# Patient Record
Sex: FEMALE | Race: WHITE | NOT HISPANIC OR LATINO | Employment: FULL TIME | URBAN - METROPOLITAN AREA
[De-identification: names, ages, dates, MRNs, and addresses within clinical notes are randomized per-mention and may not be internally consistent; named-entity substitution may affect disease eponyms.]

---

## 2017-07-21 ENCOUNTER — OFFICE VISIT - HEALTHEAST (OUTPATIENT)
Dept: FAMILY MEDICINE | Facility: CLINIC | Age: 37
End: 2017-07-21

## 2017-07-21 DIAGNOSIS — Z00.01 ENCOUNTER FOR GENERAL ADULT MEDICAL EXAMINATION WITH ABNORMAL FINDINGS: ICD-10-CM

## 2017-07-21 DIAGNOSIS — R53.83 FATIGUE: ICD-10-CM

## 2017-07-21 DIAGNOSIS — M25.50 ARTHRALGIA: ICD-10-CM

## 2017-07-21 DIAGNOSIS — R07.89 CHEST WALL PAIN: ICD-10-CM

## 2017-07-21 ASSESSMENT — MIFFLIN-ST. JEOR: SCORE: 1411.94

## 2018-03-02 ENCOUNTER — COMMUNICATION - HEALTHEAST (OUTPATIENT)
Dept: FAMILY MEDICINE | Facility: CLINIC | Age: 38
End: 2018-03-02

## 2018-03-02 DIAGNOSIS — F32.81 PREMENSTRUAL DYSPHORIC DISORDER: ICD-10-CM

## 2018-04-30 ENCOUNTER — OFFICE VISIT - HEALTHEAST (OUTPATIENT)
Dept: MIDWIFE SERVICES | Facility: CLINIC | Age: 38
End: 2018-04-30

## 2018-04-30 DIAGNOSIS — N89.8 VAGINAL ITCHING: ICD-10-CM

## 2018-04-30 DIAGNOSIS — T83.32XA INTRAUTERINE CONTRACEPTIVE DEVICE THREADS LOST, INITIAL ENCOUNTER: ICD-10-CM

## 2018-04-30 LAB
CLUE CELLS: NORMAL
TRICHOMONAS, WET PREP: NORMAL
YEAST, WET PREP: NORMAL

## 2018-04-30 ASSESSMENT — MIFFLIN-ST. JEOR: SCORE: 1411.94

## 2018-05-01 ENCOUNTER — COMMUNICATION - HEALTHEAST (OUTPATIENT)
Dept: OBGYN | Facility: CLINIC | Age: 38
End: 2018-05-01

## 2018-05-01 LAB
C TRACH DNA SPEC QL PROBE+SIG AMP: NEGATIVE
N GONORRHOEA DNA SPEC QL NAA+PROBE: NEGATIVE

## 2018-05-18 ENCOUNTER — RECORDS - HEALTHEAST (OUTPATIENT)
Dept: ADMINISTRATIVE | Facility: OTHER | Age: 38
End: 2018-05-18

## 2018-05-18 ENCOUNTER — OFFICE VISIT - HEALTHEAST (OUTPATIENT)
Dept: FAMILY MEDICINE | Facility: CLINIC | Age: 38
End: 2018-05-18

## 2018-05-18 ENCOUNTER — COMMUNICATION - HEALTHEAST (OUTPATIENT)
Dept: FAMILY MEDICINE | Facility: CLINIC | Age: 38
End: 2018-05-18

## 2018-05-18 ENCOUNTER — TRANSFERRED RECORDS (OUTPATIENT)
Dept: HEALTH INFORMATION MANAGEMENT | Facility: CLINIC | Age: 38
End: 2018-05-18

## 2018-05-18 DIAGNOSIS — F32.81 PREMENSTRUAL DYSPHORIC DISORDER: ICD-10-CM

## 2018-05-18 DIAGNOSIS — R51.9 HEADACHE: ICD-10-CM

## 2018-08-24 ENCOUNTER — RECORDS - HEALTHEAST (OUTPATIENT)
Dept: ADMINISTRATIVE | Facility: OTHER | Age: 38
End: 2018-08-24

## 2018-11-01 ENCOUNTER — COMMUNICATION - HEALTHEAST (OUTPATIENT)
Dept: FAMILY MEDICINE | Facility: CLINIC | Age: 38
End: 2018-11-01

## 2018-11-01 DIAGNOSIS — M25.50 JOINT PAIN: ICD-10-CM

## 2018-11-05 ENCOUNTER — MEDICAL CORRESPONDENCE (OUTPATIENT)
Dept: HEALTH INFORMATION MANAGEMENT | Facility: CLINIC | Age: 38
End: 2018-11-05

## 2018-11-16 ENCOUNTER — AMBULATORY - HEALTHEAST (OUTPATIENT)
Dept: FAMILY MEDICINE | Facility: CLINIC | Age: 38
End: 2018-11-16

## 2018-11-16 ENCOUNTER — COMMUNICATION - HEALTHEAST (OUTPATIENT)
Dept: FAMILY MEDICINE | Facility: CLINIC | Age: 38
End: 2018-11-16

## 2018-11-16 ENCOUNTER — TELEPHONE (OUTPATIENT)
Dept: RHEUMATOLOGY | Facility: CLINIC | Age: 38
End: 2018-11-16

## 2018-11-16 DIAGNOSIS — T83.32XD INTRAUTERINE CONTRACEPTIVE DEVICE THREADS LOST, SUBSEQUENT ENCOUNTER: ICD-10-CM

## 2018-11-16 NOTE — TELEPHONE ENCOUNTER
[facility]    Phone Message    May a detailed message be left on voicemail: yes    Reason for Call: Other: [facility]. Recs/referral fwd to clinic     Action Taken: Message routed to:  [facility] ([facility] Rheum

## 2018-11-28 NOTE — TELEPHONE ENCOUNTER
Referral received from Carmela Her at [facility] for joint pain. Patient prefers a female provider.   Valeria Matthews CMA  11/28/2018 10:34 AM

## 2018-11-29 ENCOUNTER — COMMUNICATION - HEALTHEAST (OUTPATIENT)
Dept: FAMILY MEDICINE | Facility: CLINIC | Age: 38
End: 2018-11-29

## 2018-11-30 ENCOUNTER — RECORDS - HEALTHEAST (OUTPATIENT)
Dept: ADMINISTRATIVE | Facility: OTHER | Age: 38
End: 2018-11-30

## 2018-12-04 ENCOUNTER — COMMUNICATION - HEALTHEAST (OUTPATIENT)
Dept: OBGYN | Facility: CLINIC | Age: 38
End: 2018-12-04

## 2018-12-04 ENCOUNTER — AMBULATORY - HEALTHEAST (OUTPATIENT)
Dept: OBGYN | Facility: CLINIC | Age: 38
End: 2018-12-04

## 2018-12-04 DIAGNOSIS — Z30.433 ENCOUNTER FOR IUD REMOVAL AND REINSERTION: ICD-10-CM

## 2018-12-04 ASSESSMENT — MIFFLIN-ST. JEOR: SCORE: 1474.99

## 2018-12-05 NOTE — TELEPHONE ENCOUNTER
General Rheumatology Intake Form    Reason for referral: joint pain  Referring provider: Dr. Her at [facility]    Past Rheumatologist: No     Have you been diagnosed with Fibromyalgia? no    Who manages your care for this issue now? None, this a new issue     What is your most urgent concern at this time? Joint pain    Have you seen any specialist related to the reason you are coming here? no    Where are we expecting records (labs, imaging or pathology) from? [facility]    Offered an appointment on 3/28/2018 with Dr. Logan, patient accepted and was instructed to arrive 15 minutes prior to appointment and asked to bring a current medication list. Patient verbalized understanding. Appointment reminder mailed to patient.       Valreia Matthews CMA  12/5/2018 3:14 PM

## 2018-12-14 ENCOUNTER — OFFICE VISIT - HEALTHEAST (OUTPATIENT)
Dept: FAMILY MEDICINE | Facility: CLINIC | Age: 38
End: 2018-12-14

## 2018-12-14 DIAGNOSIS — R53.83 FATIGUE, UNSPECIFIED TYPE: ICD-10-CM

## 2018-12-14 DIAGNOSIS — Z00.01 ENCOUNTER FOR GENERAL ADULT MEDICAL EXAMINATION WITH ABNORMAL FINDINGS: ICD-10-CM

## 2018-12-14 DIAGNOSIS — R23.3 EASY BRUISING: ICD-10-CM

## 2018-12-14 DIAGNOSIS — R63.5 WEIGHT GAIN: ICD-10-CM

## 2018-12-14 DIAGNOSIS — R19.06 EPIGASTRIC MASS: ICD-10-CM

## 2018-12-14 LAB
ALBUMIN SERPL-MCNC: 4.1 G/DL (ref 3.5–5)
ALP SERPL-CCNC: 59 U/L (ref 45–120)
ALT SERPL W P-5'-P-CCNC: 17 U/L (ref 0–45)
ANION GAP SERPL CALCULATED.3IONS-SCNC: 8 MMOL/L (ref 5–18)
APTT PPP: 31 SECONDS (ref 24–37)
AST SERPL W P-5'-P-CCNC: 19 U/L (ref 0–40)
BASOPHILS # BLD AUTO: 0 THOU/UL (ref 0–0.2)
BASOPHILS NFR BLD AUTO: 1 % (ref 0–2)
BILIRUB SERPL-MCNC: 0.7 MG/DL (ref 0–1)
BUN SERPL-MCNC: 11 MG/DL (ref 8–22)
C REACTIVE PROTEIN LHE: 0.1 MG/DL (ref 0–0.8)
CALCIUM SERPL-MCNC: 9.9 MG/DL (ref 8.5–10.5)
CHLORIDE BLD-SCNC: 103 MMOL/L (ref 98–107)
CLOSURE TME COLL+ADP BLD: 98 SEC (ref 1–110)
CLOSURE TME COLL+EPINEP BLD: >300 SEC (ref 1–180)
CO2 SERPL-SCNC: 28 MMOL/L (ref 22–31)
CREAT SERPL-MCNC: 0.78 MG/DL (ref 0.6–1.1)
EOSINOPHIL # BLD AUTO: 0.1 THOU/UL (ref 0–0.4)
EOSINOPHIL NFR BLD AUTO: 2 % (ref 0–6)
ERYTHROCYTE [DISTWIDTH] IN BLOOD BY AUTOMATED COUNT: 12.2 % (ref 11–14.5)
GFR SERPL CREATININE-BSD FRML MDRD: >60 ML/MIN/1.73M2
GLUCOSE BLD-MCNC: 88 MG/DL (ref 70–125)
HCT VFR BLD AUTO: 44.5 % (ref 35–47)
HGB BLD-MCNC: 14.8 G/DL (ref 12–16)
INR PPP: 1.02 (ref 0.9–1.1)
LYMPHOCYTES # BLD AUTO: 1.8 THOU/UL (ref 0.8–4.4)
LYMPHOCYTES NFR BLD AUTO: 27 % (ref 20–40)
MCH RBC QN AUTO: 30.5 PG (ref 27–34)
MCHC RBC AUTO-ENTMCNC: 33.4 G/DL (ref 32–36)
MCV RBC AUTO: 92 FL (ref 80–100)
MONOCYTES # BLD AUTO: 0.4 THOU/UL (ref 0–0.9)
MONOCYTES NFR BLD AUTO: 6 % (ref 2–10)
NEUTROPHILS # BLD AUTO: 4.2 THOU/UL (ref 2–7.7)
NEUTROPHILS NFR BLD AUTO: 64 % (ref 50–70)
PLATELET # BLD AUTO: 276 THOU/UL (ref 140–440)
PMV BLD AUTO: 8.2 FL (ref 7–10)
POTASSIUM BLD-SCNC: 4 MMOL/L (ref 3.5–5)
PROT SERPL-MCNC: 7 G/DL (ref 6–8)
RBC # BLD AUTO: 4.86 MILL/UL (ref 3.8–5.4)
SODIUM SERPL-SCNC: 139 MMOL/L (ref 136–145)
T4 FREE SERPL-MCNC: 1 NG/DL (ref 0.7–1.8)
TSH SERPL DL<=0.005 MIU/L-ACNC: 1.84 UIU/ML (ref 0.3–5)
WBC: 6.5 THOU/UL (ref 4–11)

## 2018-12-14 ASSESSMENT — MIFFLIN-ST. JEOR: SCORE: 1462.4

## 2018-12-15 ENCOUNTER — HOSPITAL ENCOUNTER (OUTPATIENT)
Dept: ULTRASOUND IMAGING | Facility: CLINIC | Age: 38
Discharge: HOME | End: 2018-12-15
Attending: FAMILY MEDICINE

## 2018-12-15 DIAGNOSIS — R19.06 EPIGASTRIC MASS: ICD-10-CM

## 2018-12-17 ENCOUNTER — COMMUNICATION - HEALTHEAST (OUTPATIENT)
Dept: FAMILY MEDICINE | Facility: CLINIC | Age: 38
End: 2018-12-17

## 2018-12-29 ENCOUNTER — RECORDS - HEALTHEAST (OUTPATIENT)
Dept: ADMINISTRATIVE | Facility: OTHER | Age: 38
End: 2018-12-29

## 2018-12-29 ENCOUNTER — TRANSFERRED RECORDS (OUTPATIENT)
Dept: HEALTH INFORMATION MANAGEMENT | Facility: CLINIC | Age: 38
End: 2018-12-29
Payer: COMMERCIAL

## 2019-01-08 ENCOUNTER — COMMUNICATION - HEALTHEAST (OUTPATIENT)
Dept: FAMILY MEDICINE | Facility: CLINIC | Age: 39
End: 2019-01-08

## 2019-04-16 ENCOUNTER — COMMUNICATION - HEALTHEAST (OUTPATIENT)
Dept: FAMILY MEDICINE | Facility: CLINIC | Age: 39
End: 2019-04-16

## 2019-04-16 DIAGNOSIS — R19.06 EPIGASTRIC MASS: ICD-10-CM

## 2019-04-26 ENCOUNTER — COMMUNICATION - HEALTHEAST (OUTPATIENT)
Dept: FAMILY MEDICINE | Facility: CLINIC | Age: 39
End: 2019-04-26

## 2019-05-14 ENCOUNTER — OFFICE VISIT - HEALTHEAST (OUTPATIENT)
Dept: FAMILY MEDICINE | Facility: CLINIC | Age: 39
End: 2019-05-14

## 2019-05-14 DIAGNOSIS — R19.06 EPIGASTRIC MASS: ICD-10-CM

## 2019-05-14 DIAGNOSIS — F32.81 PMDD (PREMENSTRUAL DYSPHORIC DISORDER): ICD-10-CM

## 2019-05-14 DIAGNOSIS — E55.9 VITAMIN D DEFICIENCY: ICD-10-CM

## 2019-05-14 ASSESSMENT — MIFFLIN-ST. JEOR: SCORE: 1497.78

## 2019-05-17 ENCOUNTER — COMMUNICATION - HEALTHEAST (OUTPATIENT)
Dept: FAMILY MEDICINE | Facility: CLINIC | Age: 39
End: 2019-05-17

## 2019-05-23 ENCOUNTER — HOSPITAL ENCOUNTER (OUTPATIENT)
Dept: CT IMAGING | Facility: HOSPITAL | Age: 39
Discharge: HOME | End: 2019-05-23
Attending: FAMILY MEDICINE

## 2019-05-23 DIAGNOSIS — R19.06 EPIGASTRIC MASS: ICD-10-CM

## 2019-06-28 ENCOUNTER — RECORDS - HEALTHEAST (OUTPATIENT)
Dept: ADMINISTRATIVE | Facility: OTHER | Age: 39
End: 2019-06-28

## 2019-06-28 ENCOUNTER — ANCILLARY PROCEDURE (OUTPATIENT)
Dept: GENERAL RADIOLOGY | Facility: CLINIC | Age: 39
End: 2019-06-28
Attending: INTERNAL MEDICINE
Payer: COMMERCIAL

## 2019-06-28 ENCOUNTER — OFFICE VISIT (OUTPATIENT)
Dept: RHEUMATOLOGY | Facility: CLINIC | Age: 39
End: 2019-06-28
Attending: INTERNAL MEDICINE
Payer: COMMERCIAL

## 2019-06-28 VITALS
OXYGEN SATURATION: 98 % | HEIGHT: 64 IN | HEART RATE: 81 BPM | DIASTOLIC BLOOD PRESSURE: 74 MMHG | BODY MASS INDEX: 31.86 KG/M2 | WEIGHT: 186.6 LBS | SYSTOLIC BLOOD PRESSURE: 114 MMHG | TEMPERATURE: 97.6 F

## 2019-06-28 DIAGNOSIS — M25.50 ARTHRALGIA, UNSPECIFIED JOINT: ICD-10-CM

## 2019-06-28 DIAGNOSIS — M25.50 ARTHRALGIA, UNSPECIFIED JOINT: Primary | ICD-10-CM

## 2019-06-28 LAB
ALBUMIN UR-MCNC: NEGATIVE MG/DL
ALT SERPL W P-5'-P-CCNC: 26 U/L (ref 0–50)
APPEARANCE UR: CLEAR
AST SERPL W P-5'-P-CCNC: 16 U/L (ref 0–45)
BILIRUB UR QL STRIP: NEGATIVE
CK SERPL-CCNC: 89 U/L (ref 30–225)
COLOR UR AUTO: YELLOW
CREAT SERPL-MCNC: 0.8 MG/DL (ref 0.52–1.04)
CRP SERPL-MCNC: <2.9 MG/L (ref 0–8)
ERYTHROCYTE [DISTWIDTH] IN BLOOD BY AUTOMATED COUNT: 12.4 % (ref 10–15)
GFR SERPL CREATININE-BSD FRML MDRD: >90 ML/MIN/{1.73_M2}
GLUCOSE UR STRIP-MCNC: NEGATIVE MG/DL
HCT VFR BLD AUTO: 45.3 % (ref 35–47)
HGB BLD-MCNC: 15.2 G/DL (ref 11.7–15.7)
HGB UR QL STRIP: NEGATIVE
KETONES UR STRIP-MCNC: NEGATIVE MG/DL
LEUKOCYTE ESTERASE UR QL STRIP: NEGATIVE
MCH RBC QN AUTO: 30.6 PG (ref 26.5–33)
MCHC RBC AUTO-ENTMCNC: 33.6 G/DL (ref 31.5–36.5)
MCV RBC AUTO: 91 FL (ref 78–100)
MUCOUS THREADS #/AREA URNS LPF: PRESENT /LPF
NITRATE UR QL: NEGATIVE
PH UR STRIP: 6 PH (ref 5–7)
PLATELET # BLD AUTO: 232 10E9/L (ref 150–450)
RBC # BLD AUTO: 4.97 10E12/L (ref 3.8–5.2)
RBC #/AREA URNS AUTO: 1 /HPF (ref 0–2)
RHEUMATOID FACT SER NEPH-ACNC: <20 IU/ML (ref 0–20)
SOURCE: ABNORMAL
SP GR UR STRIP: 1.01 (ref 1–1.03)
SQUAMOUS #/AREA URNS AUTO: <1 /HPF (ref 0–1)
UROBILINOGEN UR STRIP-MCNC: 0 MG/DL (ref 0–2)
WBC # BLD AUTO: 6.8 10E9/L (ref 4–11)
WBC #/AREA URNS AUTO: 0 /HPF (ref 0–5)

## 2019-06-28 PROCEDURE — 81001 URINALYSIS AUTO W/SCOPE: CPT | Performed by: INTERNAL MEDICINE

## 2019-06-28 PROCEDURE — 86200 CCP ANTIBODY: CPT | Performed by: INTERNAL MEDICINE

## 2019-06-28 PROCEDURE — 85027 COMPLETE CBC AUTOMATED: CPT | Performed by: INTERNAL MEDICINE

## 2019-06-28 PROCEDURE — 86140 C-REACTIVE PROTEIN: CPT | Performed by: INTERNAL MEDICINE

## 2019-06-28 PROCEDURE — 84450 TRANSFERASE (AST) (SGOT): CPT | Performed by: INTERNAL MEDICINE

## 2019-06-28 PROCEDURE — 82565 ASSAY OF CREATININE: CPT | Performed by: INTERNAL MEDICINE

## 2019-06-28 PROCEDURE — 82550 ASSAY OF CK (CPK): CPT | Performed by: INTERNAL MEDICINE

## 2019-06-28 PROCEDURE — 36415 COLL VENOUS BLD VENIPUNCTURE: CPT | Performed by: INTERNAL MEDICINE

## 2019-06-28 PROCEDURE — 86038 ANTINUCLEAR ANTIBODIES: CPT | Performed by: INTERNAL MEDICINE

## 2019-06-28 PROCEDURE — 84460 ALANINE AMINO (ALT) (SGPT): CPT | Performed by: INTERNAL MEDICINE

## 2019-06-28 PROCEDURE — 86431 RHEUMATOID FACTOR QUANT: CPT | Performed by: INTERNAL MEDICINE

## 2019-06-28 PROCEDURE — 86039 ANTINUCLEAR ANTIBODIES (ANA): CPT | Performed by: INTERNAL MEDICINE

## 2019-06-28 PROCEDURE — G0463 HOSPITAL OUTPT CLINIC VISIT: HCPCS | Mod: ZF

## 2019-06-28 RX ORDER — PNV NO.95/FERROUS FUM/FOLIC AC 28MG-0.8MG
1 TABLET ORAL EVERY OTHER DAY
COMMUNITY
End: 2021-07-15

## 2019-06-28 RX ORDER — CHOLECALCIFEROL (VITAMIN D3) 50 MCG
2000 TABLET ORAL DAILY
COMMUNITY
Start: 2019-05-14 | End: 2020-06-17

## 2019-06-28 RX ORDER — MAGNESIUM 200 MG
2 TABLET ORAL DAILY
COMMUNITY
End: 2022-09-14

## 2019-06-28 RX ORDER — ASCORBIC ACID 500 MG
250 TABLET ORAL EVERY OTHER DAY
COMMUNITY
End: 2021-07-15

## 2019-06-28 RX ORDER — PREDNISONE 5 MG/1
TABLET ORAL
Qty: 35 TABLET | Refills: 1 | Status: SHIPPED | OUTPATIENT
Start: 2019-06-28 | End: 2020-01-16

## 2019-06-28 ASSESSMENT — MIFFLIN-ST. JEOR: SCORE: 1511.41

## 2019-06-28 ASSESSMENT — PAIN SCALES - GENERAL: PAINLEVEL: MILD PAIN (2)

## 2019-06-28 ASSESSMENT — PATIENT HEALTH QUESTIONNAIRE - PHQ9: SUM OF ALL RESPONSES TO PHQ QUESTIONS 1-9: 15

## 2019-06-28 NOTE — NURSING NOTE
"Chief Complaint   Patient presents with     Consult     joint pain     /74   Pulse 81   Temp 97.6  F (36.4  C) (Oral)   Ht 1.626 m (5' 4\")   Wt 84.6 kg (186 lb 9.6 oz)   SpO2 98%   BMI 32.03 kg/m    Shelby Stevenson CMA  6/28/2019 7:36 AM      "

## 2019-06-28 NOTE — NURSING NOTE
Depression Response    Patient completed the PHQ-9 assessment for depression and scored >9? Yes  Question 9 on the PHQ-9 was positive for suicidality? Yes  Is the patient already receiving treatment for depression? No  Patient would like to speak with behavioral health team ([facility] only)? Unsure, notified provider and nurse.  I personally notified the following: visit provider, nurse    Behavioral Health/Social Work Contact Information     [facility]  Ervin Barone MA, LMFT  Lead Behavioral Health Clinician  Phone: [phone]  [facility] Pager: [phone]    [facility]  [facility] On-Call   Pager: [phone]

## 2019-06-28 NOTE — LETTER
2019      RE: Leny Jeffrey  [address] [address]       [facility]  Rheumatology Clinic  Rene Trevino MD  2019     Name: Leny Jeffrey  MRN: [id number]  Age: 38 year old  : 1980  Referring provider: Carmela Her     Assessment and Plan:  Diffuse arthralgia, small joint predominant; fatigue; recurrent occipital headaches; intermittent skin rashes; weight gain in a young woman. Exam is remarkable for scattered tenderness at MCPs and reduced lumbar flexion. In 2017, lyme and HIV were negative; HBV surface antibody was positive. In 2018, electrolytes, creatinine, TSH, CRP, CBC, and LFTs were negative or normal. MR of the cervical spine w/o contrast done on 19 showed C6-7 central disc protrusion without central stenosis or chord impingement. C3-4 disc bulge and T1-2 annular fissure and bulge.     The symptom complex is concerning for systemic rheumatic disease such as systemic lupus or rheumatoid arthritis, although there is no shay synovial thickening on exam to support the latter today. Seronegative spondyloarthropathy is a possibility given the cervical and lumbar symptoms with morning predominance. Thyroid disorder has been evaluated recently, and I doubt an infectious etiology. I recommend obtaining an MICHELLE, rheumatoid factor, CCP, inflammatory markers, and renewing screening for blood counts, LFTs, creatinine; perform plain film of the pelvis to evaluate possible sacroiliitis or erosions. I advised a short course of corticosteroids as a diagnostic measure. I suggest 15mg prednisone daily for 1 week followed by 10mg for 1 week. I request that patient contact our office to provide a progress report about the response to prednisone in 10-14 days. I advised her to follow-up with Neurology regarding the daily, slowly progressive headaches, as recommendations (tizanidine) stemming from an initial neurology visit yielded no benefit. I will arrange follow-up in 1  month time and communicate laboratory results through [facility].       Orders:  - Creatinine  - Anti Nuclear Josselyn IgG by IFA with Reflex  - CBC with platelets  - ALT  - AST  - Routine UA with Micro Reflex to Culture  - CRP inflammation  - Cyclic Citrullinated Peptide Antibody IgG  - Rheumatoid factor  - CK total  - XR Pelvis 1/2 Views  - predniSONE (DELTASONE) 5 MG tablet  Dispense: 35 tablet; Refill: 1        Follow-up: Return in about 1 month (around 7/28/2019).     HPI:   Leny Jeffrey is a 38 year old female with a history of muscle pain, fatigue, and frequent headaches who presents for initial evaluation. Patient saw Dr. Merrill in family practice in 5/2019. Reports of widespread joint and muscle pain, fatigue, and frequent headaches were noted. Patient was referred to rheumatology.    Today, she reports a 2 year history of joint aches and headaches. She describes a constant, dull headaches on the posterior aspect of her head and occasionally in her eyes. Treats these symptoms with ibuprofen or Excedrin which she does not feel helps. She notes that a cold pack somewhat helps alleviate her symptoms. She notes that there have been a few occasions of severe night time headaches where she has contemplated going into the ED. Notes that her posterior headache often causes nausea. Her symptoms do not radiate to her shoulder and is not related to activity or intensity.    She saw a neurologist last year and was prescribed with tizanidine, which did not help alleviate her symptoms. She also saw a chiropractor for low back pain. 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 She continues to have difficulty standing straight and turning in bed. These symptoms do wake her up at night. She notes that her low back pain is severe in the morning and improves after about 10 minutes. Pain is made worse when standing for prolonged periods of time.     Outside of her back  pain, she reports difficulty making a full fist due to pain in bilateral hands. She also localized pain over her elbows. Elbow pain is made worse with flexion. She has not noticed visible swelling in her hands or feet. Denies redness or warmth around the joints. Hand and foot pain is worse in the morning. She has noticed some red patches on her face, neck, and upper outer arms. Rashes do not itch. Cellphone photograph from 6/14 shows an erythematous patch and macules on the lower neck.     She is able to exercise without difficulty. Her energy level has been low and she notes that her mood has been low recently. She was previously treated with Sertraline, but did not tolerate it well. Breathing has been stable. Denies chest pain. Has had some abdominal pain. No change in bowel habits, constipation, or diarrhea. Denies mouth or nose sores, or swollen glands. Denies fevers, chills, or sweats. Denies numbness, weakness, or tingling in her arms or legs. Denies raynaud's phenomena.        Review of Systems:   Pertinent items are noted in HPI or as below, remainder of complete ROS is negative.      No recent problems with hearing or vision. No swallowing problems.   No breathing difficulty, shortness of breath, coughing, or wheezing  No chest pain or palpitations  No heart burn, indigestion, nausea, vomiting, diarrhea  No urination problems, no bloody, cloudy urine, no dysuria  No numbing, tingling, weakness  No confusion  No rashes. No easy bleeding or bruising.     Active Medications:   Current Outpatient Medications:      Calcium Carb-Cholecalciferol (CALCIUM 600/VITAMIN D3) 600-800 MG-UNIT TABS, Take by mouth daily, Disp: , Rfl:      Ferrous Sulfate (IRON) 325 (65 Fe) MG tablet, Take 1 tablet by mouth daily, Disp: , Rfl:      magnesium 250 MG tablet, Take 1 tablet by mouth, Disp: , Rfl:      predniSONE (DELTASONE) 5 MG tablet, 3 tabs daily for 1 week, then 2 tabs daily for 1 week, Disp: 35 tablet, Rfl: 1     vitamin C  "(ASCORBIC ACID) 500 MG tablet, Take 250 mg by mouth daily, Disp: , Rfl:      vitamin D3 (CHOLECALCIFEROL) 2000 units (50 mcg) tablet, Take 2,000 Units by mouth daily, Disp: , Rfl:       Allergies:   The patient reports no known allergies.     Past Medical History:  Headache  Premenstrual dysphoric disorder     Past Surgical History:  [place] repair of nasal septum  Cervical biopsy w/ loop electrode excision  [place] tooth extraction    Family History:   Father: Mental illness, alcohol abuse  Maternal grandfather: Cancer  Maternal grandmother: Colon cancer  Paternal grandfather: Heart attack  Paternal grandmother: Heart disease.  No family history of autoimmune disease.   Prophyria in Aunt and father.     Social History:   Never smoked.  Occasional alcohol use.  Has 2 children.      Physical Exam:   /74   Pulse 81   Temp 97.6  F (36.4  C) (Oral)   Ht 1.626 m (5' 4\")   Wt 84.6 kg (186 lb 9.6 oz)   SpO2 98%   BMI 32.03 kg/m      Wt Readings from Last 4 Encounters:   06/28/19 84.6 kg (186 lb 9.6 oz)     Constitutional: Well-developed, appearing stated age; cooperative  Eyes: Normal EOM, PERRLA, vision, conjunctiva, sclera  ENT: Normal external ears, nose, hearing, lips, teeth, gums, throat. No mucous membrane lesions, normal saliva pool  Neck: No mass or thyroid enlargement  Resp: Lungs clear to auscultation, nl to palpation  CV: RRR, no murmurs, rubs or gallops, no edema  GI: No ABD mass or tenderness, no HSM  : Not tested  Lymph: No cervical, supraclavicular, inguinal or epitrochlear nodes  MS: The TMJ, neck, shoulder, wrist, hip, knee, ankle, and foot MTP/IP joints were examined and found normal. No active synovitis or altered joint anatomy. Full joint ROM. No dactylitis,  tenosynovitis, enthesopathy. Good symmetry of MCPs and PIPs. Excellent fist formation. Slightly reduced  strength. Tenderness at the right 5th MCP. Pain with circumduction of the thumb. Pain at thumb MP. Tenderness at 2,3 and 4 PIPs on " the left. Full wrist ROM. Tenderness at the medial epicondyle. Tenderness at T2-3 just to the left of the midline. Tenderness at left, but not right acromial process. Knee and hip motion seem fluid and full. Broad based tenderness over left greater trochanter. 1cm by 2cm multi-component mass which is mobile. Gait is narrow based and smooth. No MTP tenderness. Slightly reduced lumbar flexion. Normal lateral flexion and extension. No inflammatory changes in scalp skin.   Skin: No nail pitting, alopecia, rash, nodules or lesions.  Neuro: Normal cranial nerves, strength, sensation, DTRs.   Psych: Normal judgement, orientation, memory, affect.     Images:  A CT of the abdomen and pelvis in 5/2019, showed anomalous bifid xyphoid process; otherwise unremarkable.   MR of the cervical spine w/o contrast done on 12/29/19 showed C6-7 central disc protrusion without central stenosis or chord impingement. C3-4 disc bulge and T1-2 annular fissure and bulge.    Laboratory:   In 2017, lyme and HIV were negative; HBV surface antibody was positive.   In 12/2018, electrolytes, creatinine, TSH, CRP, CBC, and LFTs were negative or normal.         Scribe Disclosure:  Arnulfo TSAI, am serving as a scribe to document services personally performed by Rene Trevino MD at this visit, based upon the provider's statements to me. All documentation has been reviewed by the aforementioned provider prior to being entered into the official medical record.     Arnulfo TSAI, a scribe, prepared the chart for today's encounter.       Rene Trevino MD

## 2019-06-28 NOTE — PATIENT INSTRUCTIONS
Dx:  1. Joint pain, fatigue, headaches, intermittent rashes    Concern for autoimmune or inflammatory causes such as rheumatoid arthritis or systemic lupus erythematosus.    Plan;  1. Bloodwork, urinalysis, pelvic xray  2. Consider prednisone trial to determine response to anti-inflammatory therapy.  3. Consider duloxetine after prednisone for mood stabilization and pain control.    Consider 2nd opinion for headache evaluation--Dr. Briones    Give progress report in 10-14 days.

## 2019-06-28 NOTE — PROGRESS NOTES
[facility]  Rheumatology Clinic  Rene Trevino MD  2019     Name: Leny Jeffrey  MRN: [id number]  Age: 38 year old  : 1980  Referring provider: Carmela Her     Assessment and Plan:  Diffuse arthralgia, small joint predominant; fatigue; recurrent occipital headaches; intermittent skin rashes; weight gain in a young woman. Exam is remarkable for scattered tenderness at MCPs and reduced lumbar flexion. In 2017, lyme and HIV were negative; HBV surface antibody was positive. In 2018, electrolytes, creatinine, TSH, CRP, CBC, and LFTs were negative or normal. MR of the cervical spine w/o contrast done on 19 showed C6-7 central disc protrusion without central stenosis or chord impingement. C3-4 disc bulge and T1-2 annular fissure and bulge.     The symptom complex is concerning for systemic rheumatic disease such as systemic lupus or rheumatoid arthritis, although there is no shay synovial thickening on exam to support the latter today. Seronegative spondyloarthropathy is a possibility given the cervical and lumbar symptoms with morning predominance. Thyroid disorder has been evaluated recently, and I doubt an infectious etiology. I recommend obtaining an MICHELLE, rheumatoid factor, CCP, inflammatory markers, and renewing screening for blood counts, LFTs, creatinine; perform plain film of the pelvis to evaluate possible sacroiliitis or erosions. I advised a short course of corticosteroids as a diagnostic measure. I suggest 15mg prednisone daily for 1 week followed by 10mg for 1 week. I request that patient contact our office to provide a progress report about the response to prednisone in 10-14 days. I advised her to follow-up with Neurology regarding the daily, slowly progressive headaches, as recommendations (tizanidine) stemming from an initial neurology visit yielded no benefit. I will arrange follow-up in 1 month time and communicate laboratory results through [facility].       Orders:  -  Creatinine  - Anti Nuclear Josselyn IgG by IFA with Reflex  - CBC with platelets  - ALT  - AST  - Routine UA with Micro Reflex to Culture  - CRP inflammation  - Cyclic Citrullinated Peptide Antibody IgG  - Rheumatoid factor  - CK total  - XR Pelvis 1/2 Views  - predniSONE (DELTASONE) 5 MG tablet  Dispense: 35 tablet; Refill: 1        Follow-up: Return in about 1 month (around 7/28/2019).     HPI:   Leny Jeffrey is a 38 year old female with a history of muscle pain, fatigue, and frequent headaches who presents for initial evaluation. Patient saw Dr. Merrill in family practice in 5/2019. Reports of widespread joint and muscle pain, fatigue, and frequent headaches were noted. Patient was referred to rheumatology.    Today, she reports a 2 year history of joint aches and headaches. She describes a constant, dull headaches on the posterior aspect of her head and occasionally in her eyes. Treats these symptoms with ibuprofen or Excedrin which she does not feel helps. She notes that a cold pack somewhat helps alleviate her symptoms. She notes that there have been a few occasions of severe night time headaches where she has contemplated going into the ED. Notes that her posterior headache often causes nausea. Her symptoms do not radiate to her shoulder and is not related to activity or intensity.    She saw a neurologist last year and was prescribed with tizanidine, which did not help alleviate her symptoms. She also saw a chiropractor for low back pain. 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 She continues to have difficulty standing straight and turning in bed. These symptoms do wake her up at night. She notes that her low back pain is severe in the morning and improves after about 10 minutes. Pain is made worse when standing for prolonged periods of time.     Outside of her back pain, she reports difficulty making a full fist due to pain in bilateral hands. She  also localized pain over her elbows. Elbow pain is made worse with flexion. She has not noticed visible swelling in her hands or feet. Denies redness or warmth around the joints. Hand and foot pain is worse in the morning. She has noticed some red patches on her face, neck, and upper outer arms. Rashes do not itch. Cellphone photograph from 6/14 shows an erythematous patch and macules on the lower neck.     She is able to exercise without difficulty. Her energy level has been low and she notes that her mood has been low recently. She was previously treated with Sertraline, but did not tolerate it well. Breathing has been stable. Denies chest pain. Has had some abdominal pain. No change in bowel habits, constipation, or diarrhea. Denies mouth or nose sores, or swollen glands. Denies fevers, chills, or sweats. Denies numbness, weakness, or tingling in her arms or legs. Denies raynaud's phenomena.        Review of Systems:   Pertinent items are noted in HPI or as below, remainder of complete ROS is negative.      No recent problems with hearing or vision. No swallowing problems.   No breathing difficulty, shortness of breath, coughing, or wheezing  No chest pain or palpitations  No heart burn, indigestion, nausea, vomiting, diarrhea  No urination problems, no bloody, cloudy urine, no dysuria  No numbing, tingling, weakness  No confusion  No rashes. No easy bleeding or bruising.     Active Medications:   Current Outpatient Medications:      Calcium Carb-Cholecalciferol (CALCIUM 600/VITAMIN D3) 600-800 MG-UNIT TABS, Take by mouth daily, Disp: , Rfl:      Ferrous Sulfate (IRON) 325 (65 Fe) MG tablet, Take 1 tablet by mouth daily, Disp: , Rfl:      magnesium 250 MG tablet, Take 1 tablet by mouth, Disp: , Rfl:      predniSONE (DELTASONE) 5 MG tablet, 3 tabs daily for 1 week, then 2 tabs daily for 1 week, Disp: 35 tablet, Rfl: 1     vitamin C (ASCORBIC ACID) 500 MG tablet, Take 250 mg by mouth daily, Disp: , Rfl:       "vitamin D3 (CHOLECALCIFEROL) 2000 units (50 mcg) tablet, Take 2,000 Units by mouth daily, Disp: , Rfl:       Allergies:   The patient reports no known allergies.     Past Medical History:  Headache  Premenstrual dysphoric disorder     Past Surgical History:  [place] repair of nasal septum  Cervical biopsy w/ loop electrode excision  [place] tooth extraction    Family History:   Father: Mental illness, alcohol abuse  Maternal grandfather: Cancer  Maternal grandmother: Colon cancer  Paternal grandfather: Heart attack  Paternal grandmother: Heart disease.  No family history of autoimmune disease.   Prophyria in Aunt and father.     Social History:   Never smoked.  Occasional alcohol use.  Has 2 children.      Physical Exam:   /74   Pulse 81   Temp 97.6  F (36.4  C) (Oral)   Ht 1.626 m (5' 4\")   Wt 84.6 kg (186 lb 9.6 oz)   SpO2 98%   BMI 32.03 kg/m     Wt Readings from Last 4 Encounters:   06/28/19 84.6 kg (186 lb 9.6 oz)     Constitutional: Well-developed, appearing stated age; cooperative  Eyes: Normal EOM, PERRLA, vision, conjunctiva, sclera  ENT: Normal external ears, nose, hearing, lips, teeth, gums, throat. No mucous membrane lesions, normal saliva pool  Neck: No mass or thyroid enlargement  Resp: Lungs clear to auscultation, nl to palpation  CV: RRR, no murmurs, rubs or gallops, no edema  GI: No ABD mass or tenderness, no HSM  : Not tested  Lymph: No cervical, supraclavicular, inguinal or epitrochlear nodes  MS: The TMJ, neck, shoulder, wrist, hip, knee, ankle, and foot MTP/IP joints were examined and found normal. No active synovitis or altered joint anatomy. Full joint ROM. No dactylitis,  tenosynovitis, enthesopathy. Good symmetry of MCPs and PIPs. Excellent fist formation. Slightly reduced  strength. Tenderness at the right 5th MCP. Pain with circumduction of the thumb. Pain at thumb MP. Tenderness at 2,3 and 4 PIPs on the left. Full wrist ROM. Tenderness at the medial epicondyle. Tenderness at " T2-3 just to the left of the midline. Tenderness at left, but not right acromial process. Knee and hip motion seem fluid and full. Broad based tenderness over left greater trochanter. 1cm by 2cm multi-component mass which is mobile. Gait is narrow based and smooth. No MTP tenderness. Slightly reduced lumbar flexion. Normal lateral flexion and extension. No inflammatory changes in scalp skin.   Skin: No nail pitting, alopecia, rash, nodules or lesions.  Neuro: Normal cranial nerves, strength, sensation, DTRs.   Psych: Normal judgement, orientation, memory, affect.     Images:  A CT of the abdomen and pelvis in 5/2019, showed anomalous bifid xyphoid process; otherwise unremarkable.   MR of the cervical spine w/o contrast done on 12/29/19 showed C6-7 central disc protrusion without central stenosis or chord impingement. C3-4 disc bulge and T1-2 annular fissure and bulge.    Laboratory:   In 2017, lyme and HIV were negative; HBV surface antibody was positive.   In 12/2018, electrolytes, creatinine, TSH, CRP, CBC, and LFTs were negative or normal.         Scribe Disclosure:  Arnulfo TSAI, am serving as a scribe to document services personally performed by Rene Trevino MD at this visit, based upon the provider's statements to me. All documentation has been reviewed by the aforementioned provider prior to being entered into the official medical record.     Arnulfo TSAI, a scribe, prepared the chart for today's encounter.

## 2019-07-01 LAB
ANA PAT SER IF-IMP: ABNORMAL
ANA SER QL IF: POSITIVE
ANA TITR SER IF: ABNORMAL {TITER}
CCP AB SER IA-ACNC: 1 U/ML

## 2019-07-09 ENCOUNTER — RECORDS - HEALTHEAST (OUTPATIENT)
Dept: ADMINISTRATIVE | Facility: OTHER | Age: 39
End: 2019-07-09

## 2019-07-10 ENCOUNTER — AMBULATORY - HEALTHEAST (OUTPATIENT)
Dept: LAB | Facility: CLINIC | Age: 39
End: 2019-07-10

## 2019-07-10 DIAGNOSIS — M25.50 PAIN IN JOINT, MULTIPLE SITES: ICD-10-CM

## 2019-07-16 LAB
DNA (DS) ANTIBODY - HISTORICAL: 2 IU
JO-1 AUTOANTIBODIES - HISTORICAL: 0 EU
SCL-70 AUTOANTIBODIES - HISTORICAL: 0 EU
SM (SMITH AUTOANTIBODIES - HISTORICAL: 2 EU
SM/RNP AUTOANTIBODIES - HISTORICAL: 1 EU
SS-A/RO AUTOANTIBODIES - HISTORICAL: 2 EU
SS-B/LA AUTOANTIBODIES - HISTORICAL: 1 EU

## 2019-07-23 LAB — DNA (DS) ANTIBODIES - QUEST: 2

## 2019-08-01 ENCOUNTER — TELEPHONE (OUTPATIENT)
Dept: RHEUMATOLOGY | Facility: CLINIC | Age: 39
End: 2019-08-01

## 2019-08-01 NOTE — TELEPHONE ENCOUNTER
Patient contacted and reminded of upcoming appointment.  Patient confirmed they will be attending.  Patient instructed to bring updated medications list to appointment.      Jai Stewart  EMT

## 2019-08-02 ENCOUNTER — RECORDS - HEALTHEAST (OUTPATIENT)
Dept: ADMINISTRATIVE | Facility: OTHER | Age: 39
End: 2019-08-02

## 2019-08-02 ENCOUNTER — OFFICE VISIT (OUTPATIENT)
Dept: RHEUMATOLOGY | Facility: CLINIC | Age: 39
End: 2019-08-02
Attending: INTERNAL MEDICINE
Payer: COMMERCIAL

## 2019-08-02 VITALS
HEART RATE: 76 BPM | OXYGEN SATURATION: 98 % | HEIGHT: 64 IN | WEIGHT: 187 LBS | DIASTOLIC BLOOD PRESSURE: 75 MMHG | BODY MASS INDEX: 31.92 KG/M2 | TEMPERATURE: 97.4 F | SYSTOLIC BLOOD PRESSURE: 109 MMHG

## 2019-08-02 DIAGNOSIS — F06.30 MOOD DISORDER DUE TO MEDICAL CONDITION: Primary | ICD-10-CM

## 2019-08-02 PROCEDURE — G0463 HOSPITAL OUTPT CLINIC VISIT: HCPCS | Mod: ZF

## 2019-08-02 RX ORDER — DULOXETIN HYDROCHLORIDE 30 MG/1
30 CAPSULE, DELAYED RELEASE ORAL DAILY
Qty: 30 CAPSULE | Refills: 3 | Status: SHIPPED | OUTPATIENT
Start: 2019-08-02 | End: 2020-01-16

## 2019-08-02 ASSESSMENT — MIFFLIN-ST. JEOR: SCORE: 1513.23

## 2019-08-02 ASSESSMENT — PAIN SCALES - GENERAL: PAINLEVEL: SEVERE PAIN (6)

## 2019-08-02 NOTE — PROGRESS NOTES
[facility]  Rheumatology Clinic  Rene Trevino MD  2019     Name: Leny Jeffrye  MRN: [id number]  Age: 38 year old  : 1980  Referring provider: Carmela Her     Assessment and Plan:  # Diffuse joint pain, fatigue, headaches, facial rashes, positive MICHELLE:   Patient relates persistent diffuse joint aches, daytime fatigue, dysphoria, and intermittent facial rashes. Exam shows subtle tenderness without synovitis at several finger and hand joints, but is otherwise unremarkable. Labs from 2019 show that kidney function, liver function, CBC, CRP, RF and CCP were negative or normal. Urine was clear. MICHELLE was low positive at 1:160. Double stranded DNA and JAMES panel were negative. X-ray of the pelvis from 2019 showed excellent preservation of joint margins with no evidence of erosion.     We had a good discussion about the import of the positive MICHELLE. In the absence of immunologic dysfunction, the clinical picture does not add up to a formal diagnosis of systemic lupus or other autoimmune disorder. The lack of improvement in joint pain following a trial course of low dose prednisone in spring 2019 argues against a significant inflammatory component of the patient's ongoing pain and fatigue. During discussion, patient relates long standing low mood associated with decreased motivation and intermittent hopelessness. Patient did not voice active plans to harm herself or others. She related previous discussions with her primary care provider about prospect of using anti-depressant medication. I recommend a trial of duloxetine for both analgesic and mood altering properties. I suggest duloxetine 30 mg daily. If the drug is well tolerated, she may take 30 mg twice daily after several weeks. I urged her to be in contact with her primary provider about further dose adjustments.     With regard to musculoskeletal symptoms and positive MICHELLE, the patient is at slightly increased risk of developing  autoimmunity. I recommend screening visit with rheumatology once yearly to review symptoms and check CBC, creatinine, and urinalysis.      Follow-up: Return in about 6 months (around 2/2/2020).     HPI:   Leny Jeffrey is a 38 year old female with a history of muscle pain, fatigue, and frequent headaches who presents for follow up. She established care on 06/28/2019, at which time symptoms were concerning for systemic rheumatic disease. Plan was to pursue further workup and try a short course of corticosteroids as a diagnostic measure.     Today the patient reports that she did not notice significant change in diffuse joint or muscle pain on the two week course of prednisone, although she did feel more achy a couple of days after stopping it. She continues to have fatigue throughout the day and headaches and notes that she developed a headache yesterday, for which she used naproxen. She continues to have finger and toe pain and stiffness without visible swelling. Naproxen did not help with finger pain. She is using her hands on a daily basis. She reports good sleep quality overall. She also reports areas of dryness to the face, neck, and arms but denies skin rashes otherwise. Cell phone photogram from 07/27/2019 shows some poorly marginated erythema over the right malar area. No ulcerations or comedonal regions. The area is not itchy. She saw a dermatologist last year who recommended salicylic acid lotion, moisturizing cream, and hydrocortisone 1%. No cough or shortness of breath.    She reports that over the past 3 years, her mood and attention span has been low. She reports that she feels drained and feels overall apathy for life. She has not had the energy to exercise, which has previously helped with her mood. She tried sertraline in the past but this was no effective.     Review of Systems:   Pertinent items are noted in HPI or as below, remainder of complete ROS is negative.      No recent problems with hearing  "or vision. No swallowing problems.   No breathing difficulty, shortness of breath, coughing, or wheezing  No chest pain or palpitations  No heart burn, indigestion, abdominal pain, nausea, vomiting, diarrhea  No urination problems, no bloody, cloudy urine, no dysuria  No numbing, tingling, weakness  No confusion  No easy bleeding or bruising.     Active Medications:     Current Outpatient Medications:      Calcium Carb-Cholecalciferol (CALCIUM 600/VITAMIN D3) 600-800 MG-UNIT TABS, Take by mouth daily, Disp: , Rfl:      DULoxetine (CYMBALTA) 30 MG capsule, Take 1 capsule (30 mg) by mouth daily, Disp: 30 capsule, Rfl: 3     Ferrous Sulfate (IRON) 325 (65 Fe) MG tablet, Take 1 tablet by mouth daily, Disp: , Rfl:      magnesium 250 MG tablet, Take 1 tablet by mouth, Disp: , Rfl:      predniSONE (DELTASONE) 5 MG tablet, 3 tabs daily for 1 week, then 2 tabs daily for 1 week, Disp: 35 tablet, Rfl: 1     vitamin C (ASCORBIC ACID) 500 MG tablet, Take 250 mg by mouth daily, Disp: , Rfl:      vitamin D3 (CHOLECALCIFEROL) 2000 units (50 mcg) tablet, Take 2,000 Units by mouth daily, Disp: , Rfl:       Allergies:   Patient has no known allergies.      Past Medical History:  Headache  Premenstrual dysphoric disorder     Past Surgical History:  [place] repair of nasal septum  Cervical biopsy w/ loop electrode excision  [place] tooth extraction     Family History:   Father: Mental illness, alcohol abuse  Maternal grandfather: Cancer  Maternal grandmother: Colon cancer  Paternal grandfather: Heart attack  Paternal grandmother: Heart disease.  No family history of autoimmune disease.   Prophyria in Aunt and father.      Social History:   Never smoked.  Occasional alcohol use.  Has 2 children.     Physical Exam:   /75   Pulse 76   Temp 97.4  F (36.3  C) (Oral)   Ht 1.626 m (5' 4\")   Wt 84.8 kg (187 lb)   SpO2 98%   BMI 32.10 kg/m     Wt Readings from Last 4 Encounters:   08/02/19 84.8 kg (187 lb)   06/28/19 84.6 kg (186 lb 9.6 " oz)     Constitutional: Well-developed, appearing stated age; cooperative  Eyes: Normal EOM, PERRLA, vision, conjunctiva, sclera  ENT: Normal external ears, nose, hearing, lips, teeth, gums, throat. No mucous membrane lesions, normal saliva pool  Neck: No mass or thyroid enlargement  Resp: Lungs clear to auscultation with good air flow, nl to palpation  CV: RRR, no murmurs, rubs or gallops, no edema  GI: No ABD mass or tenderness, no HSM  : Not tested  Lymph: No cervical, supraclavicular, inguinal or epitrochlear nodes  MS: There is good alignment of all knuckles and fingers. Full fist formation and excellent  strength. Tenderness at the left 3rd and 5th PIPs. 2nd and 3rd MTPs on the left and on the right without synovial thickening. Good motion of MTPs.   Skin: No nail pitting, alopecia, rash, nodules or lesions  Neuro: Normal cranial nerves, strength, sensation, DTRs.   Psych: Normal judgement, orientation, memory, affect.     Laboratory:   RHEUM RESULTS Latest Ref Rng & Units 6/28/2019   CRP, INFLAMMATION 0.0 - 8.0 mg/L <2.9   CK TOTAL 30 - 225 U/L 89   RHEUMATOID FACTOR <20 IU/mL <20   AST 0 - 45 U/L 16   ALT 0 - 50 U/L 26   WBC 4.0 - 11.0 10e9/L 6.8   RBC 3.8 - 5.2 10e12/L 4.97   HGB 11.7 - 15.7 g/dL 15.2   HCT 35.0 - 47.0 % 45.3   MCV 78 - 100 fl 91   MCHC 31.5 - 36.5 g/dL 33.6   RDW 10.0 - 15.0 % 12.4    - 450 10e9/L 232   CREATININE 0.52 - 1.04 mg/dL 0.80   GFR ESTIMATE, IF BLACK >60 mL/min/[1.73:m2] >90   GFR ESTIMATE >60 mL/min/[1.73:m2] >90       Rheumatoid Factor   Date Value Ref Range Status   06/28/2019 <20 <20 IU/mL Final     Cyclic Citrullinated Peptide Antibody, IgG   Date Value Ref Range Status   06/28/2019 1 <7 U/mL Final     Comment:     Negative     MICHELLE interpretation   Date Value Ref Range Status   06/28/2019 Positive (A) NEG^Negative Final     Comment:                                        Reference range:  <1:40  NEGATIVE  1:40 - 1:80  BORDERLINE POSITIVE  >1:80 POSITIVE        MICHELLE pattern 1   Date Value Ref Range Status   06/28/2019 NUCLEAR DOTS  Final     MICHELLE titer 1   Date Value Ref Range Status   06/28/2019 1:160  Final     Scribe Disclosure:  I, Stephani Jones, am serving as a scribe to document services personally performed by Rene Trevino MD at this visit, based upon the provider's statements to me. All documentation has been reviewed by the aforementioned provider prior to being entered into the official medical record.

## 2019-08-02 NOTE — PATIENT INSTRUCTIONS
Diagnosis:  1. Joint pain, fatigue, headaches, intermittent rashes with positive MICHELLE.  Increased risk of lupus, but no firm diagnosis possible at present.  2. Low mood.    Plan:   - Start trial of Cymbalta 30 mg daily until follow up with primary care physician   - Continue to monitor symptoms. Could consider starting hydroxychloroquine in the future.

## 2019-08-02 NOTE — NURSING NOTE
"Chief Complaint   Patient presents with     RECHECK     Arthralgia     /75   Pulse 76   Temp 97.4  F (36.3  C) (Oral)   Ht 1.626 m (5' 4\")   Wt 84.8 kg (187 lb)   SpO2 98%   BMI 32.10 kg/m    Shelby Stevenson [facility]  8/2/2019 8:41 AM      "

## 2019-08-02 NOTE — LETTER
2019      RE: Leny Jeffrey  [address] [address]       [facility]  Rheumatology Clinic  Rene Trevino MD  2019     Name: Leny Jeffrey  MRN: [id number]  Age: 38 year old  : 1980  Referring provider: Carmela Her     Assessment and Plan:  # Diffuse joint pain, fatigue, headaches, facial rashes, positive MICHELLE:   Patient relates persistent diffuse joint aches, daytime fatigue, dysphoria, and intermittent facial rashes. Exam shows subtle tenderness without synovitis at several finger and hand joints, but is otherwise unremarkable. Labs from 2019 show that kidney function, liver function, CBC, CRP, RF and CCP were negative or normal. Urine was clear. MICHELLE was low positive at 1:160. Double stranded DNA and JAMES panel were negative. X-ray of the pelvis from 2019 showed excellent preservation of joint margins with no evidence of erosion.     We had a good discussion about the import of the positive MICHELLE. In the absence of immunologic dysfunction, the clinical picture does not add up to a formal diagnosis of systemic lupus or other autoimmune disorder. The lack of improvement in joint pain following a trial course of low dose prednisone in spring 2019 argues against a significant inflammatory component of the patient's ongoing pain and fatigue. During discussion, patient relates long standing low mood associated with decreased motivation and intermittent hopelessness. Patient did not voice active plans to harm herself or others. She related previous discussions with her primary care provider about prospect of using anti-depressant medication. I recommend a trial of duloxetine for both analgesic and mood altering properties. I suggest duloxetine 30 mg daily. If the drug is well tolerated, she may take 30 mg twice daily after several weeks. I urged her to be in contact with her primary provider about further dose adjustments.     With regard to musculoskeletal symptoms  and positive MICHELLE, the patient is at slightly increased risk of developing autoimmunity. I recommend screening visit with rheumatology once yearly to review symptoms and check CBC, creatinine, and urinalysis.      Follow-up: Return in about 6 months (around 2/2/2020).     HPI:   Leny Jeffrey is a 38 year old female with a history of muscle pain, fatigue, and frequent headaches who presents for follow up. She established care on 06/28/2019, at which time symptoms were concerning for systemic rheumatic disease. Plan was to pursue further workup and try a short course of corticosteroids as a diagnostic measure.     Today the patient reports that she did not notice significant change in diffuse joint or muscle pain on the two week course of prednisone, although she did feel more achy a couple of days after stopping it. She continues to have fatigue throughout the day and headaches and notes that she developed a headache yesterday, for which she used naproxen. She continues to have finger and toe pain and stiffness without visible swelling. Naproxen did not help with finger pain. She is using her hands on a daily basis. She reports good sleep quality overall. She also reports areas of dryness to the face, neck, and arms but denies skin rashes otherwise. Cell phone photogram from 07/27/2019 shows some poorly marginated erythema over the right malar area. No ulcerations or comedonal regions. The area is not itchy. She saw a dermatologist last year who recommended salicylic acid lotion, moisturizing cream, and hydrocortisone 1%. No cough or shortness of breath.    She reports that over the past 3 years, her mood and attention span has been low. She reports that she feels drained and feels overall apathy for life. She has not had the energy to exercise, which has previously helped with her mood. She tried sertraline in the past but this was no effective.     Review of Systems:   Pertinent items are noted in HPI or as below,  "remainder of complete ROS is negative.      No recent problems with hearing or vision. No swallowing problems.   No breathing difficulty, shortness of breath, coughing, or wheezing  No chest pain or palpitations  No heart burn, indigestion, abdominal pain, nausea, vomiting, diarrhea  No urination problems, no bloody, cloudy urine, no dysuria  No numbing, tingling, weakness  No confusion  No easy bleeding or bruising.     Active Medications:     Current Outpatient Medications:      Calcium Carb-Cholecalciferol (CALCIUM 600/VITAMIN D3) 600-800 MG-UNIT TABS, Take by mouth daily, Disp: , Rfl:      DULoxetine (CYMBALTA) 30 MG capsule, Take 1 capsule (30 mg) by mouth daily, Disp: 30 capsule, Rfl: 3     Ferrous Sulfate (IRON) 325 (65 Fe) MG tablet, Take 1 tablet by mouth daily, Disp: , Rfl:      magnesium 250 MG tablet, Take 1 tablet by mouth, Disp: , Rfl:      predniSONE (DELTASONE) 5 MG tablet, 3 tabs daily for 1 week, then 2 tabs daily for 1 week, Disp: 35 tablet, Rfl: 1     vitamin C (ASCORBIC ACID) 500 MG tablet, Take 250 mg by mouth daily, Disp: , Rfl:      vitamin D3 (CHOLECALCIFEROL) 2000 units (50 mcg) tablet, Take 2,000 Units by mouth daily, Disp: , Rfl:       Allergies:   Patient has no known allergies.      Past Medical History:  Headache  Premenstrual dysphoric disorder     Past Surgical History:  [place] repair of nasal septum  Cervical biopsy w/ loop electrode excision  [place] tooth extraction     Family History:   Father: Mental illness, alcohol abuse  Maternal grandfather: Cancer  Maternal grandmother: Colon cancer  Paternal grandfather: Heart attack  Paternal grandmother: Heart disease.  No family history of autoimmune disease.   Prophyria in Aunt and father.      Social History:   Never smoked.  Occasional alcohol use.  Has 2 children.     Physical Exam:   /75   Pulse 76   Temp 97.4  F (36.3  C) (Oral)   Ht 1.626 m (5' 4\")   Wt 84.8 kg (187 lb)   SpO2 98%   BMI 32.10 kg/m      Wt Readings from " Last 4 Encounters:   08/02/19 84.8 kg (187 lb)   06/28/19 84.6 kg (186 lb 9.6 oz)     Constitutional: Well-developed, appearing stated age; cooperative  Eyes: Normal EOM, PERRLA, vision, conjunctiva, sclera  ENT: Normal external ears, nose, hearing, lips, teeth, gums, throat. No mucous membrane lesions, normal saliva pool  Neck: No mass or thyroid enlargement  Resp: Lungs clear to auscultation with good air flow, nl to palpation  CV: RRR, no murmurs, rubs or gallops, no edema  GI: No ABD mass or tenderness, no HSM  : Not tested  Lymph: No cervical, supraclavicular, inguinal or epitrochlear nodes  MS: There is good alignment of all knuckles and fingers. Full fist formation and excellent  strength. Tenderness at the left 3rd and 5th PIPs. 2nd and 3rd MTPs on the left and on the right without synovial thickening. Good motion of MTPs.   Skin: No nail pitting, alopecia, rash, nodules or lesions  Neuro: Normal cranial nerves, strength, sensation, DTRs.   Psych: Normal judgement, orientation, memory, affect.     Laboratory:   RHEUM RESULTS Latest Ref Rng & Units 6/28/2019   CRP, INFLAMMATION 0.0 - 8.0 mg/L <2.9   CK TOTAL 30 - 225 U/L 89   RHEUMATOID FACTOR <20 IU/mL <20   AST 0 - 45 U/L 16   ALT 0 - 50 U/L 26   WBC 4.0 - 11.0 10e9/L 6.8   RBC 3.8 - 5.2 10e12/L 4.97   HGB 11.7 - 15.7 g/dL 15.2   HCT 35.0 - 47.0 % 45.3   MCV 78 - 100 fl 91   MCHC 31.5 - 36.5 g/dL 33.6   RDW 10.0 - 15.0 % 12.4    - 450 10e9/L 232   CREATININE 0.52 - 1.04 mg/dL 0.80   GFR ESTIMATE, IF BLACK >60 mL/min/[1.73:m2] >90   GFR ESTIMATE >60 mL/min/[1.73:m2] >90       Rheumatoid Factor   Date Value Ref Range Status   06/28/2019 <20 <20 IU/mL Final     Cyclic Citrullinated Peptide Antibody, IgG   Date Value Ref Range Status   06/28/2019 1 <7 U/mL Final     Comment:     Negative     MICHELLE interpretation   Date Value Ref Range Status   06/28/2019 Positive (A) NEG^Negative Final     Comment:                                        Reference  range:  <1:40  NEGATIVE  1:40 - 1:80  BORDERLINE POSITIVE  >1:80 POSITIVE       MICHELLE pattern 1   Date Value Ref Range Status   06/28/2019 NUCLEAR DOTS  Final     MICHELLE titer 1   Date Value Ref Range Status   06/28/2019 1:160  Final     Scribe Disclosure:  IStephani, am serving as a scribe to document services personally performed by Rene Trevino MD at this visit, based upon the provider's statements to me. All documentation has been reviewed by the aforementioned provider prior to being entered into the official medical record.       Rene Trevino MD

## 2019-08-22 ENCOUNTER — OFFICE VISIT - HEALTHEAST (OUTPATIENT)
Dept: MIDWIFE SERVICES | Facility: CLINIC | Age: 39
End: 2019-08-22

## 2019-08-22 DIAGNOSIS — Z97.5 IUD (INTRAUTERINE DEVICE) IN PLACE: ICD-10-CM

## 2019-08-22 DIAGNOSIS — Z87.42 HISTORY OF ABNORMAL CERVICAL PAP SMEAR: ICD-10-CM

## 2019-08-22 DIAGNOSIS — Z12.4 CERVICAL CANCER SCREENING: ICD-10-CM

## 2019-08-22 DIAGNOSIS — Z30.431 SURVEILLANCE OF PREVIOUSLY PRESCRIBED INTRAUTERINE CONTRACEPTIVE DEVICE: ICD-10-CM

## 2019-08-22 ASSESSMENT — MIFFLIN-ST. JEOR: SCORE: 1498.69

## 2019-08-23 LAB
HPV SOURCE: NORMAL
HUMAN PAPILLOMA VIRUS 16 DNA: NEGATIVE
HUMAN PAPILLOMA VIRUS 18 DNA: NEGATIVE
HUMAN PAPILLOMA VIRUS FINAL DIAGNOSIS: NORMAL
HUMAN PAPILLOMA VIRUS OTHER HR: NEGATIVE
SPECIMEN DESCRIPTION: NORMAL

## 2019-10-07 ENCOUNTER — PRE VISIT (OUTPATIENT)
Dept: NEUROLOGY | Facility: CLINIC | Age: 39
End: 2019-10-07

## 2019-10-07 NOTE — TELEPHONE ENCOUNTER
FUTURE VISIT INFORMATION      FUTURE VISIT INFORMATION:    Date: 11/22/2019    Time: 930AM    Location: [facility]  REFERRAL INFORMATION:    Referring provider:  Dr. Trevino     Referring providers clinic:  [facility] Rheumatology     Reason for visit/diagnosis  Migraines     RECORDS REQUESTED FROM:       Clinic name Comments Records Status Imaging [facility]   [facility]  N/A

## 2019-11-05 ENCOUNTER — OFFICE VISIT - HEALTHEAST (OUTPATIENT)
Dept: FAMILY MEDICINE | Facility: CLINIC | Age: 39
End: 2019-11-05

## 2019-11-05 DIAGNOSIS — F32.81 PMDD (PREMENSTRUAL DYSPHORIC DISORDER): ICD-10-CM

## 2019-11-05 ASSESSMENT — ANXIETY QUESTIONNAIRES
1. FEELING NERVOUS, ANXIOUS, OR ON EDGE: NOT AT ALL
3. WORRYING TOO MUCH ABOUT DIFFERENT THINGS: SEVERAL DAYS
4. TROUBLE RELAXING: NOT AT ALL
IF YOU CHECKED OFF ANY PROBLEMS ON THIS QUESTIONNAIRE, HOW DIFFICULT HAVE THESE PROBLEMS MADE IT FOR YOU TO DO YOUR WORK, TAKE CARE OF THINGS AT HOME, OR GET ALONG WITH OTHER PEOPLE: SOMEWHAT DIFFICULT
6. BECOMING EASILY ANNOYED OR IRRITABLE: SEVERAL DAYS
5. BEING SO RESTLESS THAT IT IS HARD TO SIT STILL: NOT AT ALL
2. NOT BEING ABLE TO STOP OR CONTROL WORRYING: NOT AT ALL
GAD7 TOTAL SCORE: 2
7. FEELING AFRAID AS IF SOMETHING AWFUL MIGHT HAPPEN: NOT AT ALL

## 2019-11-05 ASSESSMENT — PATIENT HEALTH QUESTIONNAIRE - PHQ9: SUM OF ALL RESPONSES TO PHQ QUESTIONS 1-9: 12

## 2019-11-08 ASSESSMENT — ENCOUNTER SYMPTOMS
EYE WATERING: 0
INCREASED ENERGY: 1
MUSCLE CRAMPS: 0
HALLUCINATIONS: 0
SKIN CHANGES: 0
DECREASED APPETITE: 0
BLOOD IN STOOL: 0
ABDOMINAL PAIN: 1
POOR WOUND HEALING: 0
ARTHRALGIAS: 1
RECTAL PAIN: 0
BLOATING: 0
EYE IRRITATION: 0
FEVER: 0
DIARRHEA: 0
NAUSEA: 1
MUSCLE WEAKNESS: 1
POLYPHAGIA: 0
NIGHT SWEATS: 0
JOINT SWELLING: 0
WEAKNESS: 1
HEADACHES: 1
DISTURBANCES IN COORDINATION: 0
EYE REDNESS: 0
VOMITING: 0
STIFFNESS: 1
POLYDIPSIA: 0
NAIL CHANGES: 0
PANIC: 0
TINGLING: 0
EYE PAIN: 1
INSOMNIA: 0
NECK PAIN: 1
JAUNDICE: 0
WEIGHT GAIN: 1
CHILLS: 0
DOUBLE VISION: 0
BACK PAIN: 1
HEARTBURN: 0
SPEECH CHANGE: 0
FATIGUE: 1
WEIGHT LOSS: 0
ALTERED TEMPERATURE REGULATION: 1
DEPRESSION: 1
CONSTIPATION: 1
MEMORY LOSS: 1
SEIZURES: 0
PARALYSIS: 0
NERVOUS/ANXIOUS: 0
DECREASED CONCENTRATION: 1
TREMORS: 0
LOSS OF CONSCIOUSNESS: 0
DIZZINESS: 0
NUMBNESS: 0
MYALGIAS: 1
BOWEL INCONTINENCE: 0

## 2019-11-08 ASSESSMENT — HEADACHE IMPACT TEST (HIT 6)
HOW OFTEN DO HEADACHES LIMIT YOUR DAILY ACTIVITIES: SOMETIMES
HOW OFTEN DID HEADACHS LIMIT CONCENTRATION ON WORK OR DAILY ACTIVITY: ALWAYS
HIT6 TOTAL SCORE: 68
HOW OFTEN HAVE YOU FELT FED UP OR IRRITATED BECAUSE OF YOUR HEADACHES: ALWAYS
HOW OFTEN HAVE YOU FELT TOO TIRED TO WORK BECAUSE OF YOUR HEADACHES: VERY OFTEN
WHEN YOU HAVE HEADACHES HOW OFTEN IS THE PAIN SEVERE: SOMETIMES
WHEN YOU HAVE A HEADACHE HOW OFTEN DO YOU WISH YOU COULD LIE DOWN: VERY OFTEN

## 2019-11-13 ENCOUNTER — RECORDS - HEALTHEAST (OUTPATIENT)
Dept: ADMINISTRATIVE | Facility: OTHER | Age: 39
End: 2019-11-13

## 2019-11-22 ENCOUNTER — RECORDS - HEALTHEAST (OUTPATIENT)
Dept: ADMINISTRATIVE | Facility: OTHER | Age: 39
End: 2019-11-22

## 2019-11-22 ENCOUNTER — OFFICE VISIT (OUTPATIENT)
Dept: NEUROLOGY | Facility: CLINIC | Age: 39
End: 2019-11-22
Payer: COMMERCIAL

## 2019-11-22 VITALS
WEIGHT: 192.1 LBS | DIASTOLIC BLOOD PRESSURE: 79 MMHG | SYSTOLIC BLOOD PRESSURE: 107 MMHG | HEART RATE: 80 BPM | BODY MASS INDEX: 32.97 KG/M2 | OXYGEN SATURATION: 100 %

## 2019-11-22 DIAGNOSIS — R51.9 WORSENING HEADACHES: ICD-10-CM

## 2019-11-22 DIAGNOSIS — R45.86 MOOD CHANGES: ICD-10-CM

## 2019-11-22 DIAGNOSIS — G43.719 INTRACTABLE CHRONIC MIGRAINE WITHOUT AURA AND WITHOUT STATUS MIGRAINOSUS: Primary | ICD-10-CM

## 2019-11-22 RX ORDER — TOPIRAMATE 25 MG/1
TABLET, FILM COATED ORAL
Qty: 120 TABLET | Refills: 3 | Status: SHIPPED | OUTPATIENT
Start: 2019-11-22 | End: 2020-01-16

## 2019-11-22 RX ORDER — BUPROPION HYDROCHLORIDE 150 MG/1
300 TABLET ORAL EVERY MORNING
COMMUNITY
Start: 2019-11-05 | End: 2021-07-15

## 2019-11-22 RX ORDER — PROCHLORPERAZINE MALEATE 5 MG
2.5-5 TABLET ORAL EVERY 6 HOURS PRN
Qty: 20 TABLET | Refills: 3 | Status: SHIPPED | OUTPATIENT
Start: 2019-11-22 | End: 2021-09-24

## 2019-11-22 SDOH — HEALTH STABILITY: MENTAL HEALTH: HOW MANY STANDARD DRINKS CONTAINING ALCOHOL DO YOU HAVE ON A TYPICAL DAY?: 1 OR 2

## 2019-11-22 SDOH — HEALTH STABILITY: MENTAL HEALTH: HOW OFTEN DO YOU HAVE A DRINK CONTAINING ALCOHOL?: 2-4 TIMES A MONTH

## 2019-11-22 ASSESSMENT — PAIN SCALES - GENERAL: PAINLEVEL: MILD PAIN (3)

## 2019-11-22 NOTE — PROGRESS NOTES
"Re: Leny Jeffrey      MRN# [id number]  YOB: 1980  Date of Visit:11/22/2019     OUTPATIENT NEUROLOGY VISIT NOTE    Chief Complaint:  headache evaluation    History of Present Illness  Leny Jeffrey is a 39-year-old female presents to the clinic today for headache evaluation    Headache History:      Onset History: patient reports history of headaches since childhood. Father passed away when patient was 3 years old from a suicide and had migraine headaches. Patient reports that headaches would be once per week and manageable until 2 years ago.   Patient reports that headaches became almost daily.     Current Headache Pattern:      Frequency (How many headache days per month?): daily 3/10 on the numeric pain scale and about 2 headaches since October at 7/10 and lasted for 10 hours. Patient reports that she has been waking up with headaches a lot of times      Aura: none     Associated Symptoms:  nausea, light sensitivity, sound sensitivity       Description of Headache Pain & Location:  Always to the top of her head and dull and makes her feeling \"nauseaous\" and pressure behind her eyes. Patient reports that on average 3/10 and about twice per year \"extreme\" headaches at 10/10.         Do headaches interfere with or prevent usual activities or diminish your productivity at home or work?  Able to work but hard to focus  Treatments Tried:   Duloxetine for mood and generalized pain and did not help with pain and caused hyper hydrosis and has been decreasing it  Wellbutrin just started on Nov 6th    Prednisone trial in July and did not help with headaches and pain and off  Sumatriptan helps and avoids because causes \"nausea\" and may be used 5 of the tablets for the past year  Ibuprofen use and may be 5 times in the last 60 days  Excedrin 1-2 times per month  Quit coffee a month ago and did not make any difference    Have you needed to utilize the Emergency Room to treat your headache symptoms?  No ED visits " "because \" too sick to move\"     What makes your headaches better?  dark room, quiet room and sumatriptan at times    What makes your headaches worse or triggers your headaches? Wine red, stress probably     August of 2018 went to [facility] and was recommended tizanidine+chiropractor. No records from [facility] available for review. Had cervical MRI -\"C6-7 central disc protrusion, no significant central stenosis or cord impingement, C3-4 disc bulge, anterior T1-2 annual fissure and bulge\" per patient's records    Reports that she always had some depression but always was able to function fine. Worsening Mood changes, memory and concentration, weight gain and used run marathoners and was always active and headaches in the past 2 years. Patient reports that her PCP manages her medications and depression. Patient reports Wellbutrin was started. Patient reports that she is a single mother. Denies SI today     Patient reports that she sleeps in average 8 hours and prioritizes getting enough sleep   No energy to excercise     Normal menstrual cycle and has a PARAGaurd     Last ophthalmology exam 2 years ago and was presumed normal.       Denies history of head or neck trauma,  vertigo, loss of consciousness, seizure, double vision, hearing difficulty, speech or swallowing difficulty, weakness or numbness in face, arms or legs, urinary or bowel incontinence, coordination problems or gait difficulty, fever or chills.  Reports motion sickness      Neurodiagnostic Testing  CT head none  MRI brain none  Labs reviewed  Results for HARDIK HARRISON (MRN [id number]) as of 11/22/2019 10:28   Ref. Range 6/28/2019 09:13   Creatinine Latest Ref Range: 0.52 - 1.04 mg/dL 0.80   GFR Estimate Latest Ref Range: >60 mL/min/1.73_m2 >90   GFR Estimate If Black Latest Ref Range: >60 mL/min/1.73_m2 >90   ALT Latest Ref Range: 0 - 50 U/L 26   AST Latest Ref Range: 0 - 45 U/L 16   CK Total Latest Ref Range: 30 - 225 U/L " 89   CRP Inflammation Latest Ref Range: 0.0 - 8.0 mg/L <2.9   Cyclic Citrullinated Peptide Antibody, IgG Latest Ref Range: <7 U/mL 1   Rheumatoid Factor Latest Ref Range: <20 IU/mL <20   WBC Latest Ref Range: 4.0 - 11.0 10e9/L 6.8   Hemoglobin Latest Ref Range: 11.7 - 15.7 g/dL 15.2   Hematocrit Latest Ref Range: 35.0 - 47.0 % 45.3   Platelet Count Latest Ref Range: 150 - 450 10e9/L 232   RBC Count Latest Ref Range: 3.8 - 5.2 10e12/L 4.97   MCV Latest Ref Range: 78 - 100 fl 91   MCH Latest Ref Range: 26.5 - 33.0 pg 30.6   MCHC Latest Ref Range: 31.5 - 36.5 g/dL 33.6   RDW Latest Ref Range: 10.0 - 15.0 % 12.4     Past Medical History reviewed and verified with the patient  # Diffuse joint pain, fatigue, headaches, facial rashes, positive MICHELLE and has been seeing   Headaches    Past Surgical History reviewed and verified with the patient  LEEP for abnormal cervical PAP  IUD placement     Family History reviewed and verified with the patient  Father  of suicide and migraine headaches  Mother-healthy  Sibling -healthy   Social History:  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, 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, 11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,  in   Social History     Tobacco Use     Smoking status: Never Smoker     Smokeless tobacco: Never Used   Substance Use Topics     Alcohol use: Yes     Frequency: 2-4 times a month     Drinks per session: 1 or 2    reviewed and verified with the patient   No Known Allergies    Current Outpatient Medications   Medication Sig Dispense Refill     buPROPion (WELLBUTRIN XL) 150 MG 24 hr tablet Take 150 mg by mouth every morning       Calcium Carb-Cholecalciferol (CALCIUM 600/VITAMIN D3) 600-800 MG-UNIT TABS Take 1 tablet by mouth daily        Ferrous Sulfate (IRON) 325 (65 Fe) MG tablet Take 1 tablet by mouth every other day        magnesium 250 MG tablet Take 1 tablet by mouth every other day        vitamin C (ASCORBIC ACID) 500 MG tablet Take 250 mg  by mouth every other day        vitamin D3 (CHOLECALCIFEROL) 2000 units (50 mcg) tablet Take 2,000 Units by mouth daily       DULoxetine (CYMBALTA) 30 MG capsule Take 1 capsule (30 mg) by mouth daily (Patient not taking: Reported on 11/22/2019) 30 capsule 3     predniSONE (DELTASONE) 5 MG tablet 3 tabs daily for 1 week, then 2 tabs daily for 1 week (Patient not taking: Reported on 11/22/2019) 35 tablet 1   reviewed and verified with the patient    Review of Systems:  A 12-point ROS including constitutional, eyes, ENT, respiratory, cardiovascular, gastroenterology, genitourinary, integumentary, musculoskeletal, neurology, hematology and psychiatric were all reviewed with the patient and completed at the [facility] Question tricia and as mentioned in the HPI.     General Exam:   /79 (BP Location: Left arm, Patient Position: Sitting, Cuff Size: Adult Large)   Pulse 80   Wt 87.1 kg (192 lb 1.6 oz)   SpO2 100%   BMI 32.97 kg/m     GEN: Awake, NAD; good eye contact, responses appropriately   HEENT: Head atraumatic/Normocephalic. Scalp normal. Pupils equally round, 4 mm, reactive to light and accommodation, sclera and conjunctiva normal. Fundoscopic examination reveals normal vessels no papilledema.   Neck: Easily moveable without resistance  Heart: S1/S2 appreciated, RRR, no m/r/g, no carotid bruits  Lungs:Lungs are clear to auscultation bilaterally, no wheezes or crackles.   Neurological Examination:  The patient is alert and oriented times four. Has good attention and concentration.  Speech is fluent without dysarthria.   Cranial nerves:  CN I deferred.   CN II: Intact and full visual fields to confrontation bilaterally.   CN III, IV, VI: EOM intact. There is no nystagmus. Has conjugated gaze. Intact direct and consensual pupillary light reflexes.   CN V: Intact and symmetrical to facial sensation in the V1 through V3 bilaterally.   CN VII: Intact and symmetrical eyebrow and lid raise and eyelid  closure, smiles and frown.   CN VIII: Intact to finger rub bilaterally.   CN IX and X: The palates elevates symmetrical. The uvula is midline.   CN XII: The tongue protrudes midline with no atrophy or fasciculations.   Motor exam: The patient has a normal bulk and tone throughout. There is no atrophy, fasciculations, clonus, or abnormal movements appreciated.   Strength Exam:  5/5 strength at shoulder abduction, elbow flexion or extension, wrist flexion or extension, finger abduction, , hip flexion and extension, knee flexion and extension, and dorsiflexion and plantarflexion bilaterally.   Sensation is intact to light touch and pinprick throughout. Has good vibration and proprioception sensation at great toes bilaterally.   Reflexes are 2+ and symmetrical at biceps, triceps, brachioradialis, patellar.   Coordination reveals finger-nose-finger with normal speed and accuracy.   Station and gait is normal. There is no ataxia. Can walk on the toes, heels, and tandem walk without difficulty. Has no drift and a negative Romberg.     Assessment and Plan:  Chronic daily headaches and possible chronic migraine headaches. Patient has been seeing rheumatology for systemic symptoms.   Has never had brain MRI to evaluate for any secondary causes of her headaches    Plan:  Brain MRI for any secondary causes of patient's headaches  A trial of topiramate 25 mg at bedtime for one week, then take 50 mg at bedtime for one week, then take 75 mg at bedtime for one week, then 100 mg at bedtime. Side effects-paresthesia, nausea, decreased appetite, aversion of taste to soda/carbonation, renal stones, glaucoma, hyperthermia  Acute migraine treatment -sumatriptan as needed. May try prochlorperazine as needed for nausea. Naproxen 500 mg every 12 hours as needed for acute headache.   Follow up in 6-8 weeks or sooner if needed    Mood changes and possible depression-recommended to follow up with therapist/psychology/mental health  provider/PCP. Referral to Daniele GONZALEZ    Prescription for compazine and topiramate provided. Correct use and course provided. Expected benefits and typical side effects reviewed. Safety of concomitant medications and interactions reviewed. Patient taught signs and symptoms of adverse reactions and allergies. Patient understands teaching and accepts risks of prescribed medication regimen.      I discussed all my recommendation with Leny Jeffrey. The patient verbalizes understanding and comfortable with the plan. The patient has our clinic phone number to call with any questions or concerns. All of the patient's questions were answered from the best of my current knowledge.     Thank you for letting me be a part of the treatment team for Leny Jeffrey      Time spent with pt answering questions, discussing findings, counseling and coordinating care was more than 50% the appointment time,  56 minutes.         SKY Espino, [facility]    Answers for HPI/ROS submitted by the patient on 11/8/2019   General Symptoms: Yes  Skin Symptoms: Yes  HENT Symptoms: No  EYE SYMPTOMS: Yes  HEART SYMPTOMS: No  LUNG SYMPTOMS: No  INTESTINAL SYMPTOMS: Yes  URINARY SYMPTOMS: No  GYNECOLOGIC SYMPTOMS: No  BREAST SYMPTOMS: No  SKELETAL SYMPTOMS: Yes  BLOOD SYMPTOMS: No  NERVOUS SYSTEM SYMPTOMS: Yes  MENTAL HEALTH SYMPTOMS: Yes  Fever: No  Loss of appetite: No  Weight loss: No  Weight gain: Yes  Fatigue: Yes  Night sweats: No  Chills: No  Increased stress: Yes  Excessive hunger: No  Excessive thirst: No  Feeling hot or cold when others believe the temperature is normal: Yes  Loss of height: No  Post-operative complications: No  Surgical site pain: No  Hallucinations: No  Change in or Loss of Energy: Yes  Hyperactivity: No  Confusion: No  Changes in hair: No  Changes in moles/birth marks: No  Itching: No  Rashes: Yes  Changes in nails: No  Acne: No  Hair in places you don't want it: No  Change in facial hair:  No  Warts: No  Non-healing sores: No  Scarring: No  Flaking of skin: Yes  Color changes of hands/feet in cold : No  Sun sensitivity: No  Skin thickening: No  Eye pain: Yes  Vision loss: No  Dry eyes: No  Watery eyes: No  Eye bulging: No  Double vision: No  Flashing of lights: No  Spots: No  Floaters: No  Redness: No  Crossed eyes: No  Tunnel Vision: No  Yellowing of eyes: No  Eye irritation: No  Heart burn or indigestion: No  Nausea: Yes  Vomiting: No  Abdominal pain: Yes  Bloating: No  Constipation: Yes  Diarrhea: No  Blood in stool: No  Black stools: No  Rectal or Anal pain: No  Fecal incontinence: No  Yellowing of skin or eyes: No  Vomit with blood: No  Change in stools: No  Back pain: Yes  Muscle aches: Yes  Neck pain: Yes  Swollen joints: No  Joint pain: Yes  Bone pain: Yes  Muscle cramps: No  Muscle weakness: Yes  Joint stiffness: Yes  Bone fracture: No  Trouble with coordination: No  Dizziness or trouble with balance: No  Fainting or black-out spells: No  Memory loss: Yes  Headache: Yes  Seizures: No  Speech problems: No  Tingling: No  Tremor: No  Weakness: Yes  Difficulty walking: No  Paralysis: No  Numbness: No  Nervous or Anxious: No  Depression: Yes  Trouble sleeping: No  Trouble thinking or concentrating: Yes  Mood changes: Yes  Panic attacks: No

## 2019-11-22 NOTE — LETTER
"11/22/2019       RE: Leny Jeffrey  [address] [facility] [address]     Dear Colleague,    Thank you for referring your patient, Leny Jeffrey, to the [facility] NEUROLOGY at [facility]. Please see a copy of my visit note below.    Re: Leny Jeffrey      MRN# [id number]  YOB: 1980  Date of Visit:11/22/2019     OUTPATIENT NEUROLOGY VISIT NOTE    Chief Complaint:  headache evaluation    History of Present Illness  Leny Jeffrey is a 39-year-old female presents to the clinic today for headache evaluation    Headache History:      Onset History: patient reports history of headaches since childhood. Father passed away when patient was 3 years old from a suicide and had migraine headaches. Patient reports that headaches would be once per week and manageable until 2 years ago.   Patient reports that headaches became almost daily.     Current Headache Pattern:      Frequency (How many headache days per month?): daily 3/10 on the numeric pain scale and about 2 headaches since October at 7/10 and lasted for 10 hours. Patient reports that she has been waking up with headaches a lot of times      Aura: none     Associated Symptoms:  nausea, light sensitivity, sound sensitivity       Description of Headache Pain & Location:  Always to the top of her head and dull and makes her feeling \"nauseaous\" and pressure behind her eyes. Patient reports that on average 3/10 and about twice per year \"extreme\" headaches at 10/10.         Do headaches interfere with or prevent usual activities or diminish your productivity at home or work?  Able to work but hard to focus  Treatments Tried:   Duloxetine for mood and generalized pain and did not help with pain and caused hyper hydrosis and has been decreasing it  Wellbutrin just started on Nov 6th    Prednisone trial in July and did not help with headaches and pain and off  Sumatriptan helps and avoids because causes \"nausea\" and may be used " "5 of the tablets for the past year  Ibuprofen use and may be 5 times in the last 60 days  Excedrin 1-2 times per month  Quit coffee a month ago and did not make any difference    Have you needed to utilize the Emergency Room to treat your headache symptoms?  No ED visits because \" too sick to move\"     What makes your headaches better?  dark room, quiet room and sumatriptan at times    What makes your headaches worse or triggers your headaches? Wine red, stress probably     August of 2018 went to [facility] and was recommended tizanidine+chiropractor. No records from [facility] available for review. Had cervical MRI -\"C6-7 central disc protrusion, no significant central stenosis or cord impingement, C3-4 disc bulge, anterior T1-2 annual fissure and bulge\" per patient's records    Reports that she always had some depression but always was able to function fine. Worsening Mood changes, memory and concentration, weight gain and used run marathoners and was always active and headaches in the past 2 years. Patient reports that her PCP manages her medications and depression. Patient reports Wellbutrin was started. Patient reports that she is a single mother. Denies SI today     Patient reports that she sleeps in average 8 hours and prioritizes getting enough sleep   No energy to excercise     Normal menstrual cycle and has a PARAGaurd     Last ophthalmology exam 2 years ago and was presumed normal.       Denies history of head or neck trauma,  vertigo, loss of consciousness, seizure, double vision, hearing difficulty, speech or swallowing difficulty, weakness or numbness in face, arms or legs, urinary or bowel incontinence, coordination problems or gait difficulty, fever or chills.  Reports motion sickness      Neurodiagnostic Testing  CT head none  MRI brain none  Labs reviewed  Results for HARDIK HARRISON (MRN [id number]) as of 11/22/2019 10:28   Ref. Range 6/28/2019 09:13   Creatinine Latest " Ref Range: 0.52 - 1.04 mg/dL 0.80   GFR Estimate Latest Ref Range: >60 mL/min/1.73_m2 >90   GFR Estimate If Black Latest Ref Range: >60 mL/min/1.73_m2 >90   ALT Latest Ref Range: 0 - 50 U/L 26   AST Latest Ref Range: 0 - 45 U/L 16   CK Total Latest Ref Range: 30 - 225 U/L 89   CRP Inflammation Latest Ref Range: 0.0 - 8.0 mg/L <2.9   Cyclic Citrullinated Peptide Antibody, IgG Latest Ref Range: <7 U/mL 1   Rheumatoid Factor Latest Ref Range: <20 IU/mL <20   WBC Latest Ref Range: 4.0 - 11.0 10e9/L 6.8   Hemoglobin Latest Ref Range: 11.7 - 15.7 g/dL 15.2   Hematocrit Latest Ref Range: 35.0 - 47.0 % 45.3   Platelet Count Latest Ref Range: 150 - 450 10e9/L 232   RBC Count Latest Ref Range: 3.8 - 5.2 10e12/L 4.97   MCV Latest Ref Range: 78 - 100 fl 91   MCH Latest Ref Range: 26.5 - 33.0 pg 30.6   MCHC Latest Ref Range: 31.5 - 36.5 g/dL 33.6   RDW Latest Ref Range: 10.0 - 15.0 % 12.4     Past Medical History reviewed and verified with the patient  # Diffuse joint pain, fatigue, headaches, facial rashes, positive MICHELLE and has been seeing   Headaches    Past Surgical History reviewed and verified with the patient  LEEP for abnormal cervical PAP  IUD placement     Family History reviewed and verified with the patient  Father  of suicide and migraine headaches  Mother-healthy  Sibling -healthy   Social History:  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, 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, 11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,  in   Social History     Tobacco Use     Smoking status: Never Smoker     Smokeless tobacco: Never Used   Substance Use Topics     Alcohol use: Yes     Frequency: 2-4 times a month     Drinks per session: 1 or 2    reviewed and verified with the patient   No Known Allergies    Current Outpatient Medications   Medication Sig Dispense Refill     buPROPion (WELLBUTRIN XL) 150 MG 24 hr tablet Take 150 mg by mouth every morning       Calcium Carb-Cholecalciferol (CALCIUM 600/VITAMIN  D3) 600-800 MG-UNIT TABS Take 1 tablet by mouth daily        Ferrous Sulfate (IRON) 325 (65 Fe) MG tablet Take 1 tablet by mouth every other day        magnesium 250 MG tablet Take 1 tablet by mouth every other day        vitamin C (ASCORBIC ACID) 500 MG tablet Take 250 mg by mouth every other day        vitamin D3 (CHOLECALCIFEROL) 2000 units (50 mcg) tablet Take 2,000 Units by mouth daily       DULoxetine (CYMBALTA) 30 MG capsule Take 1 capsule (30 mg) by mouth daily (Patient not taking: Reported on 11/22/2019) 30 capsule 3     predniSONE (DELTASONE) 5 MG tablet 3 tabs daily for 1 week, then 2 tabs daily for 1 week (Patient not taking: Reported on 11/22/2019) 35 tablet 1   reviewed and verified with the patient    Review of Systems:  A 12-point ROS including constitutional, eyes, ENT, respiratory, cardiovascular, gastroenterology, genitourinary, integumentary, musculoskeletal, neurology, hematology and psychiatric were all reviewed with the patient and completed at the Neuroscience Services Question tricia and as mentioned in the HPI.     General Exam:   /79 (BP Location: Left arm, Patient Position: Sitting, Cuff Size: Adult Large)   Pulse 80   Wt 87.1 kg (192 lb 1.6 oz)   SpO2 100%   BMI 32.97 kg/m      GEN: Awake, NAD; good eye contact, responses appropriately   HEENT: Head atraumatic/Normocephalic. Scalp normal. Pupils equally round, 4 mm, reactive to light and accommodation, sclera and conjunctiva normal. Fundoscopic examination reveals normal vessels no papilledema.   Neck: Easily moveable without resistance  Heart: S1/S2 appreciated, RRR, no m/r/g, no carotid bruits  Lungs:Lungs are clear to auscultation bilaterally, no wheezes or crackles.   Neurological Examination:  The patient is alert and oriented times four. Has good attention and concentration.  Speech is fluent without dysarthria.   Cranial nerves:  CN I deferred.   CN II: Intact and full visual fields to confrontation bilaterally.   CN III,  IV, VI: EOM intact. There is no nystagmus. Has conjugated gaze. Intact direct and consensual pupillary light reflexes.   CN V: Intact and symmetrical to facial sensation in the V1 through V3 bilaterally.   CN VII: Intact and symmetrical eyebrow and lid raise and eyelid closure, smiles and frown.   CN VIII: Intact to finger rub bilaterally.   CN IX and X: The palates elevates symmetrical. The uvula is midline.   CN XII: The tongue protrudes midline with no atrophy or fasciculations.   Motor exam: The patient has a normal bulk and tone throughout. There is no atrophy, fasciculations, clonus, or abnormal movements appreciated.   Strength Exam:  5/5 strength at shoulder abduction, elbow flexion or extension, wrist flexion or extension, finger abduction, , hip flexion and extension, knee flexion and extension, and dorsiflexion and plantarflexion bilaterally.   Sensation is intact to light touch and pinprick throughout. Has good vibration and proprioception sensation at great toes bilaterally.   Reflexes are 2+ and symmetrical at biceps, triceps, brachioradialis, patellar.   Coordination reveals finger-nose-finger with normal speed and accuracy.   Station and gait is normal. There is no ataxia. Can walk on the toes, heels, and tandem walk without difficulty. Has no drift and a negative Romberg.     Assessment and Plan:  Chronic daily headaches and possible chronic migraine headaches. Patient has been seeing rheumatology for systemic symptoms.   Has never had brain MRI to evaluate for any secondary causes of her headaches    Plan:  Brain MRI for any secondary causes of patient's headaches  A trial of topiramate 25 mg at bedtime for one week, then take 50 mg at bedtime for one week, then take 75 mg at bedtime for one week, then 100 mg at bedtime. Side effects-paresthesia, nausea, decreased appetite, aversion of taste to soda/carbonation, renal stones, glaucoma, hyperthermia  Acute migraine treatment -sumatriptan as  needed. May try prochlorperazine as needed for nausea. Naproxen 500 mg every 12 hours as needed for acute headache.   Follow up in 6-8 weeks or sooner if needed    Mood changes and possible depression-recommended to follow up with therapist/psychology/mental health provider/PCP. Referral to Daniele GONZALEZ    Prescription for compazine and topiramate provided. Correct use and course provided. Expected benefits and typical side effects reviewed. Safety of concomitant medications and interactions reviewed. Patient taught signs and symptoms of adverse reactions and allergies. Patient understands teaching and accepts risks of prescribed medication regimen.    I discussed all my recommendation with Leny Jeffrey. The patient verbalizes understanding and comfortable with the plan. The patient has our clinic phone number to call with any questions or concerns. All of the patient's questions were answered from the best of my current knowledge.     Thank you for letting me be a part of the treatment team for Leny Jeffrey    Time spent with pt answering questions, discussing findings, counseling and coordinating care was more than 50% the appointment time,  56 minutes.     SKY Espino, CNP  [facility]

## 2019-11-22 NOTE — PATIENT INSTRUCTIONS
Plan:  Brain MRI for any secondary causes of patient's headaches  A trial of topiramate 25 mg at bedtime for one week, then take 50 mg at bedtime for one week, then take 75 mg at bedtime for one week, then 100 mg at bedtime. Side effects-paresthesia, nausea, decreased appetite, aversion of taste to soda/carbonation, renal stones, glaucoma, hyperthermia  Acute migraine treatment -sumatriptan as needed. May try prochlorperazine as needed for nausea. Naproxen 500 mg every 12 hours as needed for acute headache.   Follow up in 6-8 weeks or sooner if needed    Mood changes and possible depression-recommended to follow up with therapist/psychology/mental health provider/PCP. Referral to Daniele GONZALEZ      Patient Education     Topiramate tablets  Brand Names: Topamax, Topiragen  What is this medicine?  TOPIRAMATE (toe PYRE a mate) is used to treat seizures in adults or children with epilepsy. It is also used for the prevention of migraine headaches.  How should I use this medicine?  Take this medicine by mouth with a glass of water. Follow the directions on the prescription label. Do not crush or chew. You may take this medicine with meals. Take your medicine at regular intervals. Do not take it more often than directed.  Talk to your pediatrician regarding the use of this medicine in children. Special care may be needed. While this drug may be prescribed for children as young as 2 years of age for selected conditions, precautions do apply.  What side effects may I notice from receiving this medicine?  Side effects that you should report to your doctor or health care professional as soon as possible:    allergic reactions like skin rash, itching or hives, swelling of the face, lips, or tongue    decreased sweating and/or rise in body temperature    depression    difficulty breathing, fast or irregular breathing patterns    difficulty speaking    difficulty walking or controlling muscle movements    hearing  impairment    redness, blistering, peeling or loosening of the skin, including inside the mouth    tingling, pain or numbness in the hands or feet    unusual bleeding or bruising    unusually weak or tired    worsening of mood, thoughts or actions of suicide or dying  Side effects that usually do not require medical attention (report to your doctor or health care professional if they continue or are bothersome):    altered taste    back pain, joint or muscle aches and pains    diarrhea, or constipation    headache    loss of appetite    nausea    stomach upset, indigestion    tremors  What may interact with this medicine?  Do not take this medicine with any of the following medications:    probenecid  This medicine may also interact with the following medications:    acetazolamide    alcohol    amitriptyline    aspirin and aspirin-like medicines    birth control pills    certain medicines for depression    certain medicines for seizures    certain medicines that treat or prevent blood clots like warfarin, enoxaparin, dalteparin, apixaban, dabigatran, and rivaroxaban    digoxin    hydrochlorothiazide    lithium    medicines for pain, sleep, or muscle relaxation    metformin    methazolamide    NSAIDS, medicines for pain and inflammation, like ibuprofen or naproxen    pioglitazone    risperidone  What if I miss a dose?  If you miss a dose, take it as soon as you can. If your next dose is to be taken in less than 6 hours, then do not take the missed dose. Take the next dose at your regular time. Do not take double or extra doses.  Where should I keep my medicine?  Keep out of the reach of children.  Store at room temperature between 15 and 30 degrees C (59 and 86 degrees F) in a tightly closed container. Protect from moisture. Throw away any unused medicine after the expiration date.  What should I tell my health care provider before I take this medicine?  They need to know if you have any of these  conditions:    bleeding disorders    cirrhosis of the liver or liver disease    diarrhea    glaucoma    kidney stones or kidney disease    low blood counts, like low white cell, platelet, or red cell counts    lung disease like asthma, obstructive pulmonary disease, emphysema    metabolic acidosis    on a ketogenic diet    schedule for surgery or a procedure    suicidal thoughts, plans, or attempt; a previous suicide attempt by you or a family member    an unusual or allergic reaction to topiramate, other medicines, foods, dyes, or preservatives    pregnant or trying to get pregnant    breast-feeding  What should I watch for while using this medicine?  Visit your doctor or health care professional for regular checks on your progress. Do not stop taking this medicine suddenly. This increases the risk of seizures if you are using this medicine to control epilepsy. Wear a medical identification bracelet or chain to say you have epilepsy or seizures, and carry a card that lists all your medicines.  This medicine can decrease sweating and increase your body temperature. Watch for signs of  sweating or fever, especially in children. Avoid extreme heat, hot baths, and saunas. Be careful about exercising, especially in hot weather. Contact your health care provider right away if you notice a fever or decrease in sweating.  You should drink plenty of fluids while taking this medicine. If you have had kidney stones in the past, this will help to reduce your chances of forming kidney stones.  If you have stomach pain, with nausea or vomiting and yellowing of your eyes or skin, call your doctor immediately.  You may get drowsy, dizzy, or have blurred vision. Do not drive, use machinery, or do anything that needs mental alertness until you know how this medicine affects you. To reduce dizziness, do not sit or stand up quickly, especially if you are an older patient. Alcohol can increase drowsiness and dizziness. Avoid  alcoholic drinks.  If you notice blurred vision, eye pain, or other eye problems, seek medical attention at once for an eye exam.  The use of this medicine may increase the chance of suicidal thoughts or actions. Pay special attention to how you are responding while on this medicine. Any worsening of mood, or thoughts of suicide or dying should be reported to your health care professional right away.  This medicine may increase the chance of developing metabolic acidosis. If left untreated, this can cause kidney stones, bone disease, or slowed growth in children. Symptoms include breathing fast, fatigue, loss of appetite, irregular heartbeat, or loss of consciousness. Call your doctor immediately if you experience any of these side effects. Also, tell your doctor about any surgery you plan on having while taking this medicine since this may increase your risk for metabolic acidosis.  Birth control pills may not work properly while you are taking this medicine. Talk to your doctor about using an extra method of birth control.  Women who become pregnant while using this medicine may enroll in the [place] Antiepileptic Drug Pregnancy Registry by calling 1-181.280.2909. This registry collects information about the safety of antiepileptic drug use during pregnancy.  NOTE:This sheet is a summary. It may not cover all possible information. If you have questions about this medicine, talk to your doctor, pharmacist, or health care provider. Copyright  2019 [employer]           Patient Education     Prochlorperazine tablets  Brand Name: Compazine  What is this medicine?  PROCHLORPERAZINE (proe klor PER a soila) helps to control severe nausea and vomiting. This medicine is also used to treat schizophrenia. It can also help patients who experience anxiety that is not due to psychological illness.  How should I use this medicine?  Take this medicine by mouth with a glass of water. Follow the directions on the prescription  label. Take your doses at regular intervals. Do not take your medicine more often than directed. Do not stop taking this medicine suddenly. This can cause nausea, vomiting, and dizziness. Ask your doctor or health care professional for advice.  Talk to your pediatrician regarding the use of this medicine in children. Special care may be needed. While this drug may be prescribed for children as young as 2 years for selected conditions, precautions do apply.  What side effects may I notice from receiving this medicine?  Side effects that you should report to your doctor or health care professional as soon as possible:    blurred vision    breast enlargement in men or women    breast milk in women who are not breast-feeding    chest pain, fast or irregular heartbeat    confusion, restlessness    dark yellow or brown urine    difficulty breathing or swallowing    dizziness or fainting spells    drooling, shaking, movement difficulty (shuffling walk) or rigidity    fever, chills, sore throat    involuntary or uncontrollable movements of the eyes, mouth, head, arms, and legs    seizures    stomach area pain    unusually weak or tired    unusual bleeding or bruising    yellowing of skin or eyes  Side effects that usually do not require medical attention (report to your doctor or health care professional if they continue or are bothersome):    difficulty passing urine    difficulty sleeping    headache    sexual dysfunction    skin rash, or itching  What may interact with this medicine?  Do not take this medicine with any of the following medications:    amoxapine    antidepressants like citalopram, escitalopram, fluoxetine, paroxetine, and sertraline    deferoxamine    dofetilide    maprotiline    tricyclic antidepressants like amitriptyline, clomipramine, imipramine, nortiptyline and others  This medicine may also interact with the following medications:    lithium    medicines for  pain    phenytoin    propranolol    warfarin  What if I miss a dose?  If you miss a dose, take it as soon as you can. If it is almost time for your next dose, take only that dose. Do not take double or extra doses.  Where should I keep my medicine?  Keep out of the reach of children.  Store at room temperature between 15 and 30 degrees C (59 and 86 degrees F). Protect from light. Throw away any unused medicine after the expiration date.  What should I tell my health care provider before I take this medicine?  They need to know if you have any of these conditions:    blood disorders or disease    dementia    liver disease or jaundice    Parkinson's disease    uncontrollable movement disorder    an unusual or allergic reaction to prochlorperazine, other medicines, foods, dyes, or preservatives    pregnant or trying to get pregnant    breast-feeding  What should I watch for while using this medicine?  Visit your doctor or health care professional for regular checks on your progress.  You may get drowsy or dizzy. Do not drive, use machinery, or do anything that needs mental alertness until you know how this medicine affects you. Do not stand or sit up quickly, especially if you are an older patient. This reduces the risk of dizzy or fainting spells. Alcohol may interfere with the effect of this medicine. Avoid alcoholic drinks.  This medicine can reduce the response of your body to heat or cold. Dress warm in cold weather and stay hydrated in hot weather. If possible, avoid extreme temperatures like saunas, hot tubs, very hot or cold showers, or activities that can cause dehydration such as vigorous exercise.  This medicine can make you more sensitive to the sun. Keep out of the sun. If you cannot avoid being in the sun, wear protective clothing and use sunscreen. Do not use sun lamps or tanning beds/booths.  Your mouth may get dry. Chewing sugarless gum or sucking hard candy, and drinking plenty of water may help.  Contact your doctor if the problem does not go away or is severe.  NOTE:This sheet is a summary. It may not cover all possible information. If you have questions about this medicine, talk to your doctor, pharmacist, or health care provider. Copyright  2019 [employer]

## 2019-12-15 ENCOUNTER — ANCILLARY PROCEDURE (OUTPATIENT)
Dept: MRI IMAGING | Facility: CLINIC | Age: 39
End: 2019-12-15
Attending: NURSE PRACTITIONER
Payer: COMMERCIAL

## 2019-12-15 DIAGNOSIS — R51.9 WORSENING HEADACHES: ICD-10-CM

## 2019-12-15 RX ORDER — GADOBUTROL 604.72 MG/ML
10 INJECTION INTRAVENOUS ONCE
Status: COMPLETED | OUTPATIENT
Start: 2019-12-15 | End: 2019-12-15

## 2019-12-15 RX ADMIN — GADOBUTROL 10 ML: 604.72 INJECTION INTRAVENOUS at 14:11

## 2019-12-15 NOTE — DISCHARGE INSTRUCTIONS
MRI Contrast Discharge Instructions    The IV contrast you received today will pass out of your body in your  urine. This will happen in the next 24 hours. You will not feel this process.  Your urine will not change color.    Drink at least 4 extra glasses of water or juice today (unless your doctor  has restricted your fluids). This reduces the stress on your kidneys.  You may take your regular medicines.    If you are on dialysis: It is best to have dialysis today.    If you have a reaction: Most reactions happen right away. If you have  any new symptoms after leaving the hospital (such as hives or swelling),  call your hospital at the correct number below. Or call your family doctor.  If you have breathing distress or wheezing, call 911.    Special instructions: ***    I have read and understand the above information.    Signature:______________________________________ Date:___________    Staff:__________________________________________ Date:___________     Time:__________    [facility] Departments:    ___[place]: [phone]  ___[facility]: [phone]  ___[place]: [phone] ___[facility]: [phone]  ___[facility]: [phone]  ___[facility]: [phone]  ___[phone]  ___[facility]: [phone]  ___[phone]

## 2019-12-17 ENCOUNTER — OFFICE VISIT - HEALTHEAST (OUTPATIENT)
Dept: FAMILY MEDICINE | Facility: CLINIC | Age: 39
End: 2019-12-17

## 2019-12-17 DIAGNOSIS — Z00.00 ROUTINE GENERAL MEDICAL EXAMINATION AT A HEALTH CARE FACILITY: ICD-10-CM

## 2019-12-17 DIAGNOSIS — G43.109 MIGRAINE WITH AURA AND WITHOUT STATUS MIGRAINOSUS, NOT INTRACTABLE: ICD-10-CM

## 2019-12-17 DIAGNOSIS — G89.29 CHRONIC LEFT-SIDED LOW BACK PAIN WITHOUT SCIATICA: ICD-10-CM

## 2019-12-17 DIAGNOSIS — M54.50 CHRONIC LEFT-SIDED LOW BACK PAIN WITHOUT SCIATICA: ICD-10-CM

## 2019-12-17 DIAGNOSIS — R63.5 WEIGHT GAIN: ICD-10-CM

## 2019-12-17 DIAGNOSIS — Z79.899 HIGH RISK MEDICATION USE: ICD-10-CM

## 2019-12-17 LAB
ANION GAP SERPL CALCULATED.3IONS-SCNC: 8 MMOL/L (ref 5–18)
BUN SERPL-MCNC: 10 MG/DL (ref 8–22)
CALCIUM SERPL-MCNC: 9.7 MG/DL (ref 8.5–10.5)
CHLORIDE BLD-SCNC: 106 MMOL/L (ref 98–107)
CO2 SERPL-SCNC: 25 MMOL/L (ref 22–31)
CREAT SERPL-MCNC: 0.95 MG/DL (ref 0.6–1.1)
GFR SERPL CREATININE-BSD FRML MDRD: >60 ML/MIN/1.73M2
GLUCOSE BLD-MCNC: 81 MG/DL (ref 70–125)
POTASSIUM BLD-SCNC: 3.7 MMOL/L (ref 3.5–5)
SODIUM SERPL-SCNC: 139 MMOL/L (ref 136–145)
TSH SERPL DL<=0.005 MIU/L-ACNC: 1.75 UIU/ML (ref 0.3–5)

## 2019-12-17 ASSESSMENT — MIFFLIN-ST. JEOR: SCORE: 1503.77

## 2019-12-17 ASSESSMENT — ANXIETY QUESTIONNAIRES
6. BECOMING EASILY ANNOYED OR IRRITABLE: SEVERAL DAYS
1. FEELING NERVOUS, ANXIOUS, OR ON EDGE: SEVERAL DAYS
4. TROUBLE RELAXING: NOT AT ALL
7. FEELING AFRAID AS IF SOMETHING AWFUL MIGHT HAPPEN: NOT AT ALL
5. BEING SO RESTLESS THAT IT IS HARD TO SIT STILL: NOT AT ALL
IF YOU CHECKED OFF ANY PROBLEMS ON THIS QUESTIONNAIRE, HOW DIFFICULT HAVE THESE PROBLEMS MADE IT FOR YOU TO DO YOUR WORK, TAKE CARE OF THINGS AT HOME, OR GET ALONG WITH OTHER PEOPLE: SOMEWHAT DIFFICULT
2. NOT BEING ABLE TO STOP OR CONTROL WORRYING: NOT AT ALL
GAD7 TOTAL SCORE: 2
3. WORRYING TOO MUCH ABOUT DIFFERENT THINGS: NOT AT ALL

## 2019-12-17 ASSESSMENT — PATIENT HEALTH QUESTIONNAIRE - PHQ9: SUM OF ALL RESPONSES TO PHQ QUESTIONS 1-9: 16

## 2019-12-26 ENCOUNTER — OFFICE VISIT - HEALTHEAST (OUTPATIENT)
Dept: PHYSICAL THERAPY | Facility: REHABILITATION | Age: 39
End: 2019-12-26

## 2019-12-26 DIAGNOSIS — M54.2 CHRONIC NECK PAIN: ICD-10-CM

## 2019-12-26 DIAGNOSIS — G89.29 CHRONIC NECK PAIN: ICD-10-CM

## 2019-12-26 DIAGNOSIS — M25.69 DECREASED RANGE OF MOTION OF TRUNK AND BACK: ICD-10-CM

## 2019-12-26 DIAGNOSIS — M62.81 GENERALIZED MUSCLE WEAKNESS: ICD-10-CM

## 2019-12-26 DIAGNOSIS — M54.50 CHRONIC LEFT-SIDED LOW BACK PAIN WITHOUT SCIATICA: ICD-10-CM

## 2019-12-26 DIAGNOSIS — G89.29 CHRONIC LEFT-SIDED LOW BACK PAIN WITHOUT SCIATICA: ICD-10-CM

## 2019-12-30 ENCOUNTER — COMMUNICATION - HEALTHEAST (OUTPATIENT)
Dept: FAMILY MEDICINE | Facility: CLINIC | Age: 39
End: 2019-12-30

## 2019-12-30 DIAGNOSIS — F32.81 PMDD (PREMENSTRUAL DYSPHORIC DISORDER): ICD-10-CM

## 2020-01-16 ENCOUNTER — RECORDS - HEALTHEAST (OUTPATIENT)
Dept: ADMINISTRATIVE | Facility: OTHER | Age: 40
End: 2020-01-16

## 2020-01-16 ENCOUNTER — OFFICE VISIT (OUTPATIENT)
Dept: NEUROLOGY | Facility: CLINIC | Age: 40
End: 2020-01-16
Payer: COMMERCIAL

## 2020-01-16 ENCOUNTER — OFFICE VISIT - HEALTHEAST (OUTPATIENT)
Dept: PHYSICAL THERAPY | Facility: REHABILITATION | Age: 40
End: 2020-01-16

## 2020-01-16 VITALS — SYSTOLIC BLOOD PRESSURE: 108 MMHG | HEART RATE: 73 BPM | OXYGEN SATURATION: 99 % | DIASTOLIC BLOOD PRESSURE: 79 MMHG

## 2020-01-16 DIAGNOSIS — M54.2 CHRONIC NECK PAIN: ICD-10-CM

## 2020-01-16 DIAGNOSIS — G43.719 INTRACTABLE CHRONIC MIGRAINE WITHOUT AURA AND WITHOUT STATUS MIGRAINOSUS: ICD-10-CM

## 2020-01-16 DIAGNOSIS — M54.50 CHRONIC LEFT-SIDED LOW BACK PAIN WITHOUT SCIATICA: ICD-10-CM

## 2020-01-16 DIAGNOSIS — M25.69 DECREASED RANGE OF MOTION OF TRUNK AND BACK: ICD-10-CM

## 2020-01-16 DIAGNOSIS — M62.81 GENERALIZED MUSCLE WEAKNESS: ICD-10-CM

## 2020-01-16 DIAGNOSIS — G89.29 CHRONIC LEFT-SIDED LOW BACK PAIN WITHOUT SCIATICA: ICD-10-CM

## 2020-01-16 DIAGNOSIS — G89.29 CHRONIC NECK PAIN: ICD-10-CM

## 2020-01-16 RX ORDER — TOPIRAMATE 25 MG/1
TABLET, FILM COATED ORAL
Qty: 90 TABLET | Refills: 3 | Status: SHIPPED | OUTPATIENT
Start: 2020-01-16 | End: 2020-04-10

## 2020-01-16 RX ORDER — ASCORBIC ACID 500 MG
250 TABLET ORAL EVERY OTHER DAY
COMMUNITY
End: 2021-07-15

## 2020-01-16 RX ORDER — CEFUROXIME AXETIL 250 MG/1
6 TABLET ORAL
Qty: 3 KIT | Refills: 9 | Status: SHIPPED | OUTPATIENT
Start: 2020-01-16 | End: 2021-07-15

## 2020-01-16 RX ORDER — ONDANSETRON 4 MG/1
4 TABLET, ORALLY DISINTEGRATING ORAL EVERY 6 HOURS PRN
Qty: 20 TABLET | Refills: 6 | Status: SHIPPED | OUTPATIENT
Start: 2020-01-16 | End: 2021-07-15

## 2020-01-16 ASSESSMENT — PATIENT HEALTH QUESTIONNAIRE - PHQ9: SUM OF ALL RESPONSES TO PHQ QUESTIONS 1-9: 3

## 2020-01-16 ASSESSMENT — PAIN SCALES - GENERAL: PAINLEVEL: MODERATE PAIN (4)

## 2020-01-16 NOTE — PROGRESS NOTES
"Re: Leny Jeffrey      MRN# [id number]  YOB: 1980  Date of Visit:1/16/2020     OUTPATIENT NEUROLOGY VISIT NOTE    Reason for Visit:  Headache follow up    Interval History:  Leny Jeffrey is a 39-year-old female presents to the clinic today for headache follow up  Initial Headache Clinic visit for headache evaluation on 11/22/2019, see note for details.     Today Patient reports that headaches are better but still and gets about 3 headache days on average per week and about 12-15 per month but lesser intensity. Before starting topiramate headaches were daily. Patient reports that intensity about 4/10 and usually does not take anything. Patient reports that she takes ibuprofen about once per week and haven't taken sumatriptan because sumatriptan causes nausea. Patient did not try compazine yet.   Patient reports that she topiramate caused \"incontinence\" at the dose of 100 mg at bedtime and improved after decreasing to 75 mg at bedtime.     Plan discussed with the patient:  Acute migraine treatment as needed -a trial of sumatriptan injectable as needed.   Ondansetron for nausea as needed   Continue topiramate for migraine prevention and may try 50 mg am and 50 mg at bedtime or 25 mg am and 75 mg at bedtime.   We can consider a trial of Galcanezumab (Emgality) if no improvement with topiramate.   Follow up in 3-6 months or sooner if needed    Neurodiagnostic Testing reviewed  MRI  Brain MRI without and with contrast  Head MRA without contrast  History: WORSENING HEADACHES AND HISTORY OF POSSIBLE LUPUS; Worsening  headaches.  ICD-10: Worsening headaches  Comparison: None     Technique: Axial T1-weighted, axial FLAIR and susceptibility images  were obtained without intravenous contrast. Following intravenous  gadolinium-based contrast administration, axial T2-weighted,  diffusion, and T1-weighted images (in multiple planes) were obtained.  3-D time of flight head MRA performed without contrast  Contrast: " 10mL Gadavist     Findings:  There is no mass effect, midline shift, or evidence of intracranial  hemorrhage. The ventricles are not enlarged out of proportion to the  cerebral sulci.  Postcontrast images demonstrate no abnormal intracranial enhancing  lesions.     No definite abnormality of the skull marrow signal is noted. The major  vascular flow-voids appear patent. The orbits, visualized portions of  paranasal sinuses, and mastoid air cells are relatively clear.                                                                      Impression:   No evidence of abnormal enhancing lesions intracranially. No aneurysm  on head MRA.         Past Medical History reviewed   Social History     Tobacco Use     Smoking status: Never Smoker     Smokeless tobacco: Never Used   Substance Use Topics     Alcohol use: Yes     Frequency: 2-4 times a month     Drinks per session: 1 or 2    reviewed and verified with the patient   No Known Allergies    Current Outpatient Medications   Medication Sig Dispense Refill     buPROPion (WELLBUTRIN XL) 150 MG 24 hr tablet Take 300 mg by mouth every morning        Calcium Carb-Cholecalciferol (CALCIUM 600/VITAMIN D3) 600-800 MG-UNIT TABS Take 1 tablet by mouth daily        Ferrous Sulfate (IRON) 325 (65 Fe) MG tablet Take 1 tablet by mouth every other day        magnesium 250 MG tablet Take 1 tablet by mouth every other day        prochlorperazine (COMPAZINE) 5 MG tablet Take 0.5-1 tablets (2.5-5 mg) by mouth every 6 hours as needed for nausea or vomiting 20 tablet 3     topiramate (TOPAMAX) 25 MG tablet Take 25 mg at HS for one week, then take 50 mg at HS for one week, then take 75 mg at HS for one week, then 100 mg at HS as tolerated (Patient taking differently: Take 75 mg by mouth daily Take 25 mg at HS for one week, then take 50 mg at HS for one week, then take 75 mg at HS for one week, then 100 mg at HS as tolerated) 120 tablet 3     vitamin C (ASCORBIC ACID) 500 MG tablet Take 250 mg  by mouth every other day        vitamin D3 (CHOLECALCIFEROL) 2000 units (50 mcg) tablet Take 2,000 Units by mouth daily       DULoxetine (CYMBALTA) 30 MG capsule Take 1 capsule (30 mg) by mouth daily (Patient not taking: Reported on 11/22/2019) 30 capsule 3     predniSONE (DELTASONE) 5 MG tablet 3 tabs daily for 1 week, then 2 tabs daily for 1 week (Patient not taking: Reported on 11/22/2019) 35 tablet 1   reviewed and verified with the patient    Review of Systems:   A 10-point ROS including constitutional, eyes, respiratory, cardiovascular, gastroenterology, genitourinary, integumentary, musculoskeletal, neurology and psychiatric were reviewed and positives  as mentioned in the interval history.     General Exam:   /79   Pulse 73   SpO2 99%   GEN: Awake, NAD; good eye contact, responses appropriately, healthy appearing   HEENT: Head atraumatic/Normocephalic. Scalp normal. Pupils equally round, 4 mm, reactive to light and accommodation, sclera and conjunctiva normal. Speech is fluent without dysarthria. EOM intact.  Face is symmetrical. Intact and symmetrical eyebrow and lid raise and eyelid closure, smiles. Hearing Intact to conversation speech.  Normal casual gait.    Assessment and Plan:  See Interval History for our discussion and plan    I discussed all my recommendation with Leny Jeffrey. The patient verbalizes understanding and comfortable with the plan. The patient has our clinic phone number to call with any questions or concerns. All of the patient's questions were answered from the best of my current knowledge.     Time spent with pt answering questions, discussing findings, counseling and coordinating care was more than 50% the appointment time,  21 minutes.         SKY Espino, CNP  [facility]

## 2020-01-16 NOTE — NURSING NOTE
Chief Complaint   Patient presents with     Headache     [facility] RETURN HEADACHE - F/U      Dimple Clifford, EMT

## 2020-01-16 NOTE — LETTER
"     RE: Leny Jeffrey  [address] [address]     Dear Colleague,    Thank you for referring your patient, Leny Jeffrey, to the [facility] at [facility]. Please see a copy of my visit note below.    Re: Leny Jeffrey      MRN# [id number]  YOB: 1980  Date of Visit:1/16/2020     OUTPATIENT NEUROLOGY VISIT NOTE    Reason for Visit:  Headache follow up    Interval History:  Leny Jeffrey is a 39-year-old female presents to the clinic today for headache follow up  Initial Headache Clinic visit for headache evaluation on 11/22/2019, see note for details.     Today Patient reports that headaches are better but still and gets about 3 headache days on average per week and about 12-15 per month but lesser intensity. Before starting topiramate headaches were daily. Patient reports that intensity about 4/10 and usually does not take anything. Patient reports that she takes ibuprofen about once per week and haven't taken sumatriptan because sumatriptan causes nausea. Patient did not try compazine yet.   Patient reports that she topiramate caused \"incontinence\" at the dose of 100 mg at bedtime and improved after decreasing to 75 mg at bedtime.     Plan discussed with the patient:  Acute migraine treatment as needed -a trial of sumatriptan injectable as needed.   Ondansetron for nausea as needed   Continue topiramate for migraine prevention and may try 50 mg am and 50 mg at bedtime or 25 mg am and 75 mg at bedtime.   We can consider a trial of Galcanezumab (Emgality) if no improvement with topiramate.   Follow up in 3-6 months or sooner if needed    Neurodiagnostic Testing reviewed  MRI  Brain MRI without and with contrast  Head MRA without contrast  History: WORSENING HEADACHES AND HISTORY OF POSSIBLE LUPUS; Worsening  headaches.  ICD-10: Worsening headaches  Comparison: None     Technique: Axial T1-weighted, axial FLAIR and susceptibility images  were " obtained without intravenous contrast. Following intravenous  gadolinium-based contrast administration, axial T2-weighted,  diffusion, and T1-weighted images (in multiple planes) were obtained.  3-D time of flight head MRA performed without contrast  Contrast: 10mL Gadavist     Findings:  There is no mass effect, midline shift, or evidence of intracranial  hemorrhage. The ventricles are not enlarged out of proportion to the  cerebral sulci.  Postcontrast images demonstrate no abnormal intracranial enhancing  lesions.     No definite abnormality of the skull marrow signal is noted. The major  vascular flow-voids appear patent. The orbits, visualized portions of  paranasal sinuses, and mastoid air cells are relatively clear.                                                                      Impression:   No evidence of abnormal enhancing lesions intracranially. No aneurysm  on head MRA.         Past Medical History reviewed   Social History     Tobacco Use     Smoking status: Never Smoker     Smokeless tobacco: Never Used   Substance Use Topics     Alcohol use: Yes     Frequency: 2-4 times a month     Drinks per session: 1 or 2    reviewed and verified with the patient   No Known Allergies    Current Outpatient Medications   Medication Sig Dispense Refill     buPROPion (WELLBUTRIN XL) 150 MG 24 hr tablet Take 300 mg by mouth every morning        Calcium Carb-Cholecalciferol (CALCIUM 600/VITAMIN D3) 600-800 MG-UNIT TABS Take 1 tablet by mouth daily        Ferrous Sulfate (IRON) 325 (65 Fe) MG tablet Take 1 tablet by mouth every other day        magnesium 250 MG tablet Take 1 tablet by mouth every other day        prochlorperazine (COMPAZINE) 5 MG tablet Take 0.5-1 tablets (2.5-5 mg) by mouth every 6 hours as needed for nausea or vomiting 20 tablet 3     topiramate (TOPAMAX) 25 MG tablet Take 25 mg at HS for one week, then take 50 mg at HS for one week, then take 75 mg at HS for one week, then 100 mg at HS as  tolerated (Patient taking differently: Take 75 mg by mouth daily Take 25 mg at HS for one week, then take 50 mg at HS for one week, then take 75 mg at HS for one week, then 100 mg at HS as tolerated) 120 tablet 3     vitamin C (ASCORBIC ACID) 500 MG tablet Take 250 mg by mouth every other day        vitamin D3 (CHOLECALCIFEROL) 2000 units (50 mcg) tablet Take 2,000 Units by mouth daily       DULoxetine (CYMBALTA) 30 MG capsule Take 1 capsule (30 mg) by mouth daily (Patient not taking: Reported on 11/22/2019) 30 capsule 3     predniSONE (DELTASONE) 5 MG tablet 3 tabs daily for 1 week, then 2 tabs daily for 1 week (Patient not taking: Reported on 11/22/2019) 35 tablet 1   reviewed and verified with the patient    Review of Systems:   A 10-point ROS including constitutional, eyes, respiratory, cardiovascular, gastroenterology, genitourinary, integumentary, musculoskeletal, neurology and psychiatric were reviewed and positives  as mentioned in the interval history.     General Exam:   /79   Pulse 73   SpO2 99%   GEN: Awake, NAD; good eye contact, responses appropriately, healthy appearing   HEENT: Head atraumatic/Normocephalic. Scalp normal. Pupils equally round, 4 mm, reactive to light and accommodation, sclera and conjunctiva normal. Speech is fluent without dysarthria. EOM intact.  Face is symmetrical. Intact and symmetrical eyebrow and lid raise and eyelid closure, smiles. Hearing Intact to conversation speech.  Normal casual gait.  Assessment and Plan:  See Interval History for our discussion and plan    I discussed all my recommendation with Leny Jeffrey. The patient verbalizes understanding and comfortable with the plan. The patient has our clinic phone number to call with any questions or concerns. All of the patient's questions were answered from the best of my current knowledge.     Time spent with pt answering questions, discussing findings, counseling and coordinating care was more than 50% the  appointment time,  21 minutes.     SKY Espino, CNP  [facility]

## 2020-01-16 NOTE — PATIENT INSTRUCTIONS
Plan:  Acute migraine treatment as needed -a trial of sumatriptan injectable as needed.   Ondansetron for nausea as needed   Continue topiramate for migraine prevention and may try 50 mg am and 50 mg at bedtime or 25 mg am and 75 mg at bedtime.   We can consider a trial of Galcanezumab (Emgality) if no improvement with topiramate.   Follow up in 3-6 months or sooner if needed

## 2020-01-30 ENCOUNTER — OFFICE VISIT - HEALTHEAST (OUTPATIENT)
Dept: PHYSICAL THERAPY | Facility: REHABILITATION | Age: 40
End: 2020-01-30

## 2020-01-30 DIAGNOSIS — G89.29 CHRONIC NECK PAIN: ICD-10-CM

## 2020-01-30 DIAGNOSIS — M54.2 CHRONIC NECK PAIN: ICD-10-CM

## 2020-01-30 DIAGNOSIS — G89.29 CHRONIC LEFT-SIDED LOW BACK PAIN WITHOUT SCIATICA: ICD-10-CM

## 2020-01-30 DIAGNOSIS — M62.81 GENERALIZED MUSCLE WEAKNESS: ICD-10-CM

## 2020-01-30 DIAGNOSIS — M54.50 CHRONIC LEFT-SIDED LOW BACK PAIN WITHOUT SCIATICA: ICD-10-CM

## 2020-01-30 DIAGNOSIS — M25.69 DECREASED RANGE OF MOTION OF TRUNK AND BACK: ICD-10-CM

## 2020-02-07 ENCOUNTER — AMBULATORY - HEALTHEAST (OUTPATIENT)
Dept: FAMILY MEDICINE | Facility: CLINIC | Age: 40
End: 2020-02-07

## 2020-02-07 DIAGNOSIS — G44.89 OTHER HEADACHE SYNDROME: ICD-10-CM

## 2020-02-13 ENCOUNTER — OFFICE VISIT - HEALTHEAST (OUTPATIENT)
Dept: PHYSICAL THERAPY | Facility: REHABILITATION | Age: 40
End: 2020-02-13

## 2020-02-13 DIAGNOSIS — M54.2 CHRONIC NECK PAIN: ICD-10-CM

## 2020-02-13 DIAGNOSIS — M54.50 CHRONIC LEFT-SIDED LOW BACK PAIN WITHOUT SCIATICA: ICD-10-CM

## 2020-02-13 DIAGNOSIS — M62.81 GENERALIZED MUSCLE WEAKNESS: ICD-10-CM

## 2020-02-13 DIAGNOSIS — G89.29 CHRONIC NECK PAIN: ICD-10-CM

## 2020-02-13 DIAGNOSIS — M25.69 DECREASED RANGE OF MOTION OF TRUNK AND BACK: ICD-10-CM

## 2020-02-13 DIAGNOSIS — G89.29 CHRONIC LEFT-SIDED LOW BACK PAIN WITHOUT SCIATICA: ICD-10-CM

## 2020-02-18 ENCOUNTER — OFFICE VISIT - HEALTHEAST (OUTPATIENT)
Dept: FAMILY MEDICINE | Facility: CLINIC | Age: 40
End: 2020-02-18

## 2020-02-18 DIAGNOSIS — G89.29 CHRONIC LEFT-SIDED LOW BACK PAIN WITH LEFT-SIDED SCIATICA: ICD-10-CM

## 2020-02-18 DIAGNOSIS — M54.42 CHRONIC LEFT-SIDED LOW BACK PAIN WITH LEFT-SIDED SCIATICA: ICD-10-CM

## 2020-02-18 DIAGNOSIS — F32.81 PMDD (PREMENSTRUAL DYSPHORIC DISORDER): ICD-10-CM

## 2020-02-18 ASSESSMENT — PATIENT HEALTH QUESTIONNAIRE - PHQ9: SUM OF ALL RESPONSES TO PHQ QUESTIONS 1-9: 3

## 2020-02-20 ENCOUNTER — COMMUNICATION - HEALTHEAST (OUTPATIENT)
Dept: FAMILY MEDICINE | Facility: CLINIC | Age: 40
End: 2020-02-20

## 2020-02-20 DIAGNOSIS — M62.838 MUSCLE SPASM: ICD-10-CM

## 2020-02-29 ENCOUNTER — HOSPITAL ENCOUNTER (OUTPATIENT)
Dept: MRI IMAGING | Facility: CLINIC | Age: 40
Discharge: HOME | End: 2020-02-29
Attending: FAMILY MEDICINE

## 2020-02-29 DIAGNOSIS — G89.29 CHRONIC LEFT-SIDED LOW BACK PAIN WITH LEFT-SIDED SCIATICA: ICD-10-CM

## 2020-02-29 DIAGNOSIS — M54.42 CHRONIC LEFT-SIDED LOW BACK PAIN WITH LEFT-SIDED SCIATICA: ICD-10-CM

## 2020-03-03 ENCOUNTER — COMMUNICATION - HEALTHEAST (OUTPATIENT)
Dept: FAMILY MEDICINE | Facility: CLINIC | Age: 40
End: 2020-03-03

## 2020-03-03 DIAGNOSIS — G43.109 MIGRAINE WITH AURA AND WITHOUT STATUS MIGRAINOSUS, NOT INTRACTABLE: ICD-10-CM

## 2020-03-11 ENCOUNTER — HEALTH MAINTENANCE LETTER (OUTPATIENT)
Age: 40
End: 2020-03-11

## 2020-03-15 ENCOUNTER — VIRTUAL VISIT (OUTPATIENT)
Dept: FAMILY MEDICINE | Facility: OTHER | Age: 40
End: 2020-03-15

## 2020-03-15 ENCOUNTER — OFFICE VISIT - HEALTHEAST (OUTPATIENT)
Dept: FAMILY MEDICINE | Facility: CLINIC | Age: 40
End: 2020-03-15

## 2020-03-15 DIAGNOSIS — R05.9 COUGH: ICD-10-CM

## 2020-03-15 NOTE — PROGRESS NOTES
"Date: 03/15/2020 09:33:15  Clinician: Fior Parra  Clinician NPI: [id number]  Patient: Leny Jeffrey  Patient : 1980  Patient Address: [address] [place], [place] [id number]  Patient Phone: [phone]  Visit Protocol: URI  Patient Summary:  Leny is a 39 year old ( : 1980 ) female who initiated a Visit for cold, sinus infection, or influenza. When asked the question \"Please sign me up to receive news, health information and promotions from [facility].\", Leny responded \"No\".    Leny states her symptoms started gradually 7-9 days ago. After her symptoms started, they improved and then got worse again.   Her symptoms consist of a cough and nasal congestion. She is experiencing mild difficulty breathing with activities but can speak normally in full sentences.   Symptom details     Nasal secretions: The color of her mucus is clear.    Cough: Leny coughs almost every minute and her cough is not more bothersome at night. Phlegm does not come into her throat when she coughs. She does not believe her cough is caused by post-nasal drip.      Leny denies having ear pain, malaise, rhinitis, enlarged lymph nodes, facial pain or pressure, myalgias, chills, wheezing, sore throat, teeth pain, fever, and headache. She also denies taking antibiotic medication for the symptoms and having recent facial or sinus surgery in the past 60 days.   Precipitating events  She has not recently been exposed to someone with influenza. Leny has been in close contact with the following high risk individuals: pregnant women.   Pertinent COVID-19 (Coronavirus) information  Leny has not traveled internationally or to the areas where COVID-19 (Coronavirus) is widespread in the last 14 days before the start of her symptoms.   Leny has not had close contact with a suspected or laboratory-confirmed COVID-19 patient within 14 days of symptom onset.   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   Pertinent medical " history  Leny does not get yeast infections when she takes antibiotics.   Leny does not need a return to work/school note.   Weight: 177 lbs   Leny does not smoke or use smokeless tobacco.   She denies pregnancy and denies breastfeeding. She has menstruated in the past month.   Weight: 177 lbs    MEDICATIONS: topiramate oral, bupropion HCl oral, ALLERGIES: NKDA  Clinician Response:  Dear Leny,  Based on the information provided, you have a viral upper respiratory infection, otherwise known as a cold. Symptoms vary from person to person, but can include sneezing, coughing, a runny nose, sore throat, and headache and range from mild to severe.  Unfortunately, there are no medications that can cure a cold, so treatment is focused on controlling symptoms as much as possible. Most people gradually feel better until symptoms are gone in 1-2 weeks.  Medication information  Because you have a viral infection, antibiotics will not help you get better. Treating a viral infection with antibiotics could actually make you feel worse.  I am prescribing:       Fluticasone 50 mcg/actuation nasal spray. Inhale 2 sprays in each nostril 1 time per day; after 1 week, may adjust to 1 - 2 sprays in each nostril 1 time per day. This medication takes several days to start working, so keep taking it even if it doesn't help right away. There are no refills with this prescription.      Ventolin HFA 90 mcg/actuation aerosol inhaler. Inhale 2 puffs every 4-6 hours as needed for 5 days. There are no refills with this prescription.     Self care  The following tips will keep you as comfortable as possible while you recover:     Rest    Drink plenty of water and other liquids    Take a hot shower to loosen congestion    Take a spoonful of honey to reduce your cough     When to seek care  Please be seen in a clinic or urgent care if new symptoms develop, or symptoms become worse.  Additional treatment plan   Dear Leny Jeffrey,  Based on the  information you have provided, it is recommended that you go to one of our designated Corona Virus 19 testing centers to get a test done from your car. To do this follow these instructions:  You should go to one of our dedicated testing centers as soon as possible during the hours below at one of these locations:   Walk-in Care: [facility] at [address], [place] [address]. Hours: M-F 7am - 6pm, Sat-Sun 8am -- 3pm   [facility] at [address], [place] [address]. Hours: Every Day 9am -- 7pm  Walk-in Care: [facility] at [address], [place] [address]. Hours: M-F 7am - 6pm, Sat-Sun 8am -- 3pm  [facility] [place] [address] [place] [address]. Hours: M-F 11am -- 7pm, Sat-Sun 9am-4pm   What to expect:   When you arrive please come park in the parking lot.  Call [phone] and let them know which of the four clinics you are at, description of your car and where you are parked. Mention you did an [facility] visit and were sent for testing.  They will add you to the queue to get your test (you will stay in your car the entire time).  On that phone call you will give them the information to register your for the visit.  You will then be met by a provider who will perform a brief assessment in your car and collect samples to send for Corona Virus 19, influenza and possibly RSV.  You will be given patient information about respiratory illnesses and instructions. You with the results if you are not on mychart.   Isolate Yourself:   Isolate yourself while traveling.  Do Not allow any visitors within 6 feet.  Do Not go to work or school.  Do Not go to [employer],  centers, shopping, or other public places.  Do Not shake hands.  Avoid close contact with others (hugging, kissing).  Protect Others:  Cover Your Mouth and Nose with a mask, disposable tissue or wash cloth to avoid spreading germs to others.  Wash your hands and  face frequently with soap and water   Fever Medicines:   For fever relief, take acetaminophen or ibuprofen.  Treat fevers above 101deg F (38.3deg C) to lower fevers and make you more comfortable.   Acetaminophen (e.g., Tylenol): Take 650 mg (two 325 mg pills) by mouth every 4-6 hours as needed of regular strength Tylenol or 1,000 mg (two 500 mg pills) every 8 hours as needed of Extra Strength Tylenol.   Ibuprofen (e.g., Motrin, Advil): Take 400 mg (two 200 mg pills) by mouth every 6 hours as needed.   Acetaminophen is thought to be safer than ibuprofen or naproxen for people over 65 years old. Acetaminophen is in many OTC and prescription medicines. It might be in more than one medicine that you are taking. You need to be careful and not take an overdose. Before taking any medicine, read all the instructions on the package.  Caution -NSAIDs (e.g., ibuprofen, naproxen): Do not take nonsteroidal anti-inflammatory drugs (NSAIDs) if you have stomach problems, kidney disease, heart failure, or other contraindications to using this type of medicine. Do not take NSAID medicines for over 7 days without consulting your PCP. Do not take NSAID medicines if you are pregnant. Do not take NSAID medicines if you are also taking blood thinners.   Call Back If: Breathing difficulty develops or you become worse.  Thank you for limiting contact with others, wearing a simple mask to cover your cough, practice good hand hygiene habits and accessing our virtual services where possible to limit the spread of this virus.  For more information about COVID19 and options for caring for yourself at home, please visit the CDC website at https://www.cdc.gov/coronavirus/2019-ncov/about/steps-when-sick.html  For more options for care at [facility], please visit our website at [email or web address]    Diagnosis: Cough  Diagnosis ICD: R05  Additional Clinician Notes: It is difficult to tell without seeing you in  person what type of cough you have. The albuterol inhaler every 4-6 hours may help. Please send someone to the pharmacy instead of yourself, or do drive thru, as you are under investigation for COVID  Prescription: fluticasone 50 mcg/actuation nasal spray,suspension 1 120 spray aerosol with adapter (grams), 30 days supply. Inhale 2 sprays in each nostril 1 time per day; after 1 week, may adjust to 1 - 2 sprays in each nostril 1 time per day.. Refills: 0, Refill as needed: no, Allow substitutions: yes  Prescription: Ventolin HFA 90 mcg/actuation inhalation HFA aerosol inhaler 1 200 inhalation canister, 5 days supply. Inhale 2 puffs every 4-6 hours as needed for 5 days. Refills: 0, Refill as needed: no, Allow substitutions: yes  Pharmacy: [id number] - [phone] - [address] [place] [address]

## 2020-03-20 ENCOUNTER — COMMUNICATION - HEALTHEAST (OUTPATIENT)
Dept: SCHEDULING | Facility: CLINIC | Age: 40
End: 2020-03-20

## 2020-03-20 LAB
COVID-19 VIRUS PCR RESULT FROM MDH: NEGATIVE
SPECIMEN STATUS: NORMAL

## 2020-03-24 ENCOUNTER — OFFICE VISIT - HEALTHEAST (OUTPATIENT)
Dept: FAMILY MEDICINE | Facility: CLINIC | Age: 40
End: 2020-03-24

## 2020-03-24 ENCOUNTER — COMMUNICATION - HEALTHEAST (OUTPATIENT)
Dept: SCHEDULING | Facility: CLINIC | Age: 40
End: 2020-03-24

## 2020-03-24 DIAGNOSIS — J20.9 ACUTE BRONCHITIS, UNSPECIFIED ORGANISM: ICD-10-CM

## 2020-04-09 DIAGNOSIS — G43.719 INTRACTABLE CHRONIC MIGRAINE WITHOUT AURA AND WITHOUT STATUS MIGRAINOSUS: ICD-10-CM

## 2020-04-10 NOTE — TELEPHONE ENCOUNTER
Rx Authorization:    Requested Medication/ Dose: Topamax 25mg    Date last refill ordered: 1/16/20    Quantity ordered: 90    # refills: 3    Date of last clinic visit with ordering provider: 1/16/20    Date of next clinic visit with ordering provider: non    All pertinent protocol data (lab date/result):     Include pertinent information from patients message:

## 2020-04-12 RX ORDER — TOPIRAMATE 25 MG/1
TABLET, FILM COATED ORAL
Qty: 90 TABLET | Refills: 3 | Status: SHIPPED | OUTPATIENT
Start: 2020-04-12 | End: 2020-04-14

## 2020-04-14 DIAGNOSIS — G43.719 INTRACTABLE CHRONIC MIGRAINE WITHOUT AURA AND WITHOUT STATUS MIGRAINOSUS: ICD-10-CM

## 2020-04-14 RX ORDER — TOPIRAMATE 25 MG/1
TABLET, FILM COATED ORAL
Qty: 120 TABLET | Refills: 6 | Status: SHIPPED | OUTPATIENT
Start: 2020-04-14 | End: 2020-11-03

## 2020-05-15 ENCOUNTER — OFFICE VISIT - HEALTHEAST (OUTPATIENT)
Dept: MIDWIFE SERVICES | Facility: CLINIC | Age: 40
End: 2020-05-15

## 2020-05-15 DIAGNOSIS — N93.9 VAGINAL SPOTTING: ICD-10-CM

## 2020-05-15 ASSESSMENT — MIFFLIN-ST. JEOR: SCORE: 1435.19

## 2020-06-03 ENCOUNTER — COMMUNICATION - HEALTHEAST (OUTPATIENT)
Dept: MIDWIFE SERVICES | Facility: CLINIC | Age: 40
End: 2020-06-03

## 2020-06-03 DIAGNOSIS — N93.9 ABNORMAL UTERINE BLEEDING (AUB): ICD-10-CM

## 2020-06-08 NOTE — PROGRESS NOTES
[facility]  Rheumatology Clinic  Rene Trevino MD  2020     Name: Leny Jeffrey  MRN: [id number]  Age: 38 year old  : 1980  Referring provider: Carmela Her     Assessment and Plan:  # Diffuse joint pain, fatigue, headaches, facial rashes, positive MICHELLE:   Patient relates persistent diffuse joint aches, daytime fatigue, dysphoria, but improved intermittent facial rashes (after stopping caffeine). Video exam shows no swelling in visible hand joints, and is otherwise unremarkable. Labs from 2019 show that kidney function, liver function, CBC, CRP, RF and CCP were negative or normal. Urine was clear. MICHELLE was low positive at 1:160. Double stranded DNA and JAMES panel were negative. X-ray of the pelvis from 2019 showed excellent preservation of joint margins with no evidence of erosion.    Although patient has documented low positive MICHELLE, the clinical picture does not add up to a formal diagnosis of systemic lupus or other autoimmune disorder that would benefit from immunomodulatory therapy. The lack of improvement in joint pain following a trial course of low dose prednisone in Spring 2019 argued against a significant inflammatory component of the patient's ongoing pain and fatigue. Given diffuse. musculoskeletal symptoms and positive MICHELLE, the patient remains at slightly increased risk of developing autoimmunity. I recommend screening visit with rheumatology every 6-12 months to review symptoms and check CBC, creatinine, and urinalysis.    2.  Chronic low back pain, worsening. No inflammatory or neuropathic features. Possibly related to stenosis.  Agree with physical therapy, and daily flexion extension exercises for likely muscle pain generators.  Referral to nonoperative sports medicine for evaluation.  3.  Chronic headaches, migrainous  4.  Medial epicondylitis: bilateral, not likely related to systemic rheumatic disease.     Plan:  1.  Recheck blood counts, creatinine, liver  "function, inflammatory markers, and urine for signs of visceral involvement by inflammatory or autoimmune disease.  2.  Continue regular physical exercise, get good quality sleep, utilize ibuprofen 400 to 600 mg up to 3 times daily as needed for joint discomfort; I see that the patient is using Wellbutrin for mood disorder.  If it has not already been tried through primary care, I recommend a trial of duloxetine for both analgesic and mood altering properties.  3.  Visit with nonoperative sports orthopedics to discuss therapeutic approaches to recently documented lumbar stenosis.  4.  Continue follow-up with neurology for headache management.  5.  Monitor for new or worsening symptoms in the skin, chest, abdomen.  6.  Use heat pack to inside of both elbows twice daily for 15 minutes.  Consider whether sleep positioning could exacerbate tension on the tendon attachments at the elbow.    Follow-up: 6 mos    HPI:   Leny Jeffrey is a 39 year old female with a history of muscle pain, fatigue, and frequent headaches who presents for follow up. Last seen in 8-2019, when watchful waiting was continued.    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  She still has headaches, no change in intensity.  Low back pain continues; MRI showed \"stenosis\" in lumbar spine.   Fingers are still painful; not tender, but diffusely fatigued. ADLs are not particularly affected; she doesn't have full strength to lift and pull. In the mornings, hands and feet are stiff and \"tired\" in the mornings, lasting 15-30 minutes. But tiredness does not resolve through the day.  She does take ibuprofen for headaches, but does not feel that it helps her joint pain.  Acetaminophen does not help joint symptoms.  Back pain is dominant over others; she went to [facility] until COVID19.    She stopped drinking coffee, and thinks that raised patches on her neck have decreased since.    Prior " history:    HX 8-2019:  Today the patient reports that she did not notice significant change in diffuse joint or muscle pain on the two week course of prednisone, although she did feel more achy a couple of days after stopping it. She continues to have fatigue throughout the day and headaches and notes that she developed a headache yesterday, for which she used naproxen. She continues to have finger and toe pain and stiffness without visible swelling. Naproxen did not help with finger pain. She is using her hands on a daily basis. She reports good sleep quality overall. She also reports areas of dryness to the face, neck, and arms but denies skin rashes otherwise. Cell phone photogram from 07/27/2019 shows some poorly marginated erythema over the right malar area. No ulcerations or comedonal regions. The area is not itchy. She saw a dermatologist last year who recommended salicylic acid lotion, moisturizing cream, and hydrocortisone 1%. No cough or shortness of breath.    She reports that over the past 3 years, her mood and attention span has been low. She reports that she feels drained and feels overall apathy for life. She has not had the energy to exercise, which has previously helped with her mood. She tried sertraline in the past but this was no effective.     Review of Systems:   Pertinent items are noted in HPI or as below, remainder of complete ROS is negative.      No recent problems with hearing or vision. No swallowing problems.   No breathing difficulty, shortness of breath, coughing, or wheezing  No chest pain or palpitations  No heart burn, indigestion, abdominal pain, nausea, vomiting, diarrhea  No urination problems, no bloody, cloudy urine, no dysuria  No numbing, tingling, weakness  No confusion  No easy bleeding or bruising; + spotting between her periods    Active Medications:     Current Outpatient Medications:      ascorbic acid 500 MG TABS, Take 250 mg by mouth every other day, Disp: , Rfl:       buPROPion (WELLBUTRIN XL) 150 MG 24 hr tablet, Take 300 mg by mouth every morning , Disp: , Rfl:      Calcium Carb-Cholecalciferol (CALCIUM 600/VITAMIN D3) 600-800 MG-UNIT TABS, Take 1 tablet by mouth daily , Disp: , Rfl:      Ferrous Sulfate (IRON) 325 (65 Fe) MG tablet, Take 1 tablet by mouth every other day , Disp: , Rfl:      magnesium 250 MG tablet, Take 1 tablet by mouth every other day , Disp: , Rfl:      ondansetron (ZOFRAN-ODT) 4 MG ODT tab, Take 1 tablet (4 mg) by mouth every 6 hours as needed for nausea, Disp: 20 tablet, Rfl: 6     prochlorperazine (COMPAZINE) 5 MG tablet, Take 0.5-1 tablets (2.5-5 mg) by mouth every 6 hours as needed for nausea or vomiting, Disp: 20 tablet, Rfl: 3     SUMAtriptan (IMITREX STATDOSE) 6 MG/0.5ML pen injector kit, Inject 0.5 mLs (6 mg) Subcutaneous at onset of headache for migraine (may repeat in one hour as needed. Max 12 mg in 24 hours), Disp: 3 kit, Rfl: 9     topiramate (TOPAMAX) 25 MG tablet, TAKE 4 TABS (100MG) at bedtime, Disp: 120 tablet, Rfl: 6     vitamin C (ASCORBIC ACID) 500 MG tablet, Take 250 mg by mouth every other day , Disp: , Rfl:      vitamin D3 (CHOLECALCIFEROL) 2000 units (50 mcg) tablet, Take 2,000 Units by mouth daily, Disp: , Rfl:       Allergies:   Patient has no known allergies.      Past Medical History:  Headache  Premenstrual dysphoric disorder     Past Surgical History:  MS repair of nasal septum  Cervical biopsy w/ loop electrode excision  [place] tooth extraction     Family History:   Father: Mental illness, alcohol abuse  Maternal grandfather: Cancer  Maternal grandmother: Colon cancer  Paternal grandfather: Heart attack  Paternal grandmother: Heart disease.  No family history of autoimmune disease.   Prophyria in Aunt and father.      Social History:   Never smoked.  Occasional alcohol use.  Has 2 children.     Physical Exam:   There were no vitals taken for this visit.   Wt Readings from Last 4 Encounters:   11/22/19 87.1 kg (192 lb 1.6  oz)   08/02/19 84.8 kg (187 lb)   06/28/19 84.6 kg (186 lb 9.6 oz)     Constitutional: Well-developed, appearing stated age; cooperative  Eyes: Normal EOM, PERRLA, vision, conjunctiva, sclera  ENT: Normal external ears, nose, hearing, lips, teeth, gums, throat. No mucous membrane lesions, normal saliva pool  Neck: No mass or thyroid enlargement  MS: Normal alignment of PIPs and MCPs; no visible swelling at the joints of the hands or fingers; fist formation excellent.  Patient points out points of maximum tenderness at inner elbows that correlate with the medial epicondyles.  Skin: No nail pitting, alopecia, rash, nodules or lesions  Neuro: Normal cranial nerves, strength  Psych: Normal judgement, orientation, memory, affect.     Laboratory:   RHEUM RESULTS Latest Ref Rng & Units 6/28/2019   CRP, INFLAMMATION 0.0 - 8.0 mg/L <2.9   CK TOTAL 30 - 225 U/L 89   RHEUMATOID FACTOR <20 IU/mL <20   AST 0 - 45 U/L 16   ALT 0 - 50 U/L 26   WBC 4.0 - 11.0 10e9/L 6.8   RBC 3.8 - 5.2 10e12/L 4.97   HGB 11.7 - 15.7 g/dL 15.2   HCT 35.0 - 47.0 % 45.3   MCV 78 - 100 fl 91   MCHC 31.5 - 36.5 g/dL 33.6   RDW 10.0 - 15.0 % 12.4    - 450 10e9/L 232   CREATININE 0.52 - 1.04 mg/dL 0.80   GFR ESTIMATE, IF BLACK >60 mL/min/[1.73:m2] >90   GFR ESTIMATE >60 mL/min/[1.73:m2] >90       Rheumatoid Factor   Date Value Ref Range Status   06/28/2019 <20 <20 IU/mL Final     Cyclic Citrullinated Peptide Antibody, IgG   Date Value Ref Range Status   06/28/2019 1 <7 U/mL Final     Comment:     Negative     MICHELLE interpretation   Date Value Ref Range Status   06/28/2019 Positive (A) NEG^Negative Final     Comment:                                        Reference range:  <1:40  NEGATIVE  1:40 - 1:80  BORDERLINE POSITIVE  >1:80 POSITIVE       MICHELLE pattern 1   Date Value Ref Range Status   06/28/2019 NUCLEAR DOTS  Final     MICHELLE titer 1   Date Value Ref Range Status   06/28/2019 1:160  Final       Leny Jeffrey is a 39 year old female who is being  "evaluated via a billable video visit.      The patient has been notified of following:     \"This video visit will be conducted via a call between you and your physician/provider. We have found that certain health care needs can be provided without the need for an in-person physical exam.  This service lets us provide the care you need with a video conversation.  If a prescription is necessary we can send it directly to your pharmacy.  If lab work is needed we can place an order for that and you can then stop by our lab to have the test done at a later time.    Video visits are billed at different rates depending on your insurance coverage.  Please reach out to your insurance provider with any questions.    If during the course of the call the physician/provider feels a video visit is not appropriate, you will not be charged for this service.\"    Patient has given verbal consent for Video visit? Yes    How would you like to obtain your AVS? Mail a copy    Patient would like the video invitation sent by: Send to e-mail at: [email or web address]    Will anyone else be joining your video visit? No        Video-Visit Details    Type of service:  Video Visit    Video Start Time: 8:09 AM  Video End Time: 8:46 AM    Originating Location (pt. Location): Home    Distant Location (provider location):  [facility] RHEUMATOLOGY     Platform used for Video Visit: Fiona Trevino MD        "

## 2020-06-09 ENCOUNTER — RECORDS - HEALTHEAST (OUTPATIENT)
Dept: ADMINISTRATIVE | Facility: OTHER | Age: 40
End: 2020-06-09

## 2020-06-09 ENCOUNTER — VIRTUAL VISIT (OUTPATIENT)
Dept: RHEUMATOLOGY | Facility: CLINIC | Age: 40
End: 2020-06-09
Attending: INTERNAL MEDICINE
Payer: COMMERCIAL

## 2020-06-09 ENCOUNTER — VIRTUAL VISIT (OUTPATIENT)
Dept: NEUROLOGY | Facility: CLINIC | Age: 40
End: 2020-06-09
Payer: COMMERCIAL

## 2020-06-09 DIAGNOSIS — G89.29 CHRONIC BILATERAL LOW BACK PAIN WITHOUT SCIATICA: Primary | ICD-10-CM

## 2020-06-09 DIAGNOSIS — M25.50 ARTHRALGIA, UNSPECIFIED JOINT: ICD-10-CM

## 2020-06-09 DIAGNOSIS — M54.50 CHRONIC BILATERAL LOW BACK PAIN WITHOUT SCIATICA: Primary | ICD-10-CM

## 2020-06-09 DIAGNOSIS — G43.719 INTRACTABLE CHRONIC MIGRAINE WITHOUT AURA AND WITHOUT STATUS MIGRAINOSUS: Primary | ICD-10-CM

## 2020-06-09 RX ORDER — ONABOTULINUMTOXINA 200 [USP'U]/1
155 INJECTION, POWDER, LYOPHILIZED, FOR SOLUTION INTRADERMAL; INTRAMUSCULAR
Qty: 155 UNITS | Refills: 4 | Status: SHIPPED | OUTPATIENT
Start: 2020-06-09 | End: 2021-04-23

## 2020-06-09 RX ORDER — ELETRIPTAN HYDROBROMIDE 20 MG/1
20-40 TABLET, FILM COATED ORAL
Qty: 18 TABLET | Refills: 3 | Status: SHIPPED | OUTPATIENT
Start: 2020-06-09 | End: 2021-04-23

## 2020-06-09 NOTE — PATIENT INSTRUCTIONS
"Diagnosis:  1.  Diffuse arthralgia, joint \"fatigue\", positive MICHELLE: Persistent but nonprogressive symptoms continue to raise questions about autoimmune and inflammatory disease.  Diagnosis of lupus or other rheumatic disorder is not established, however.  2.  Chronic low back pain, worsening. No inflammatory or neuropathic features. Possibly related to stenosis.  3.  Chronic headaches, migrainous.  4.  Medial epicondylitis, bilateral, not likely related to systemic rheumatic disease.    Plan:  1.  Recheck blood counts, creatinine, liver function, inflammatory markers, and urine for signs of visceral involvement by inflammatory or autoimmune disease.  2.  Continue regular physical exercise, get good quality sleep, utilize ibuprofen 400 to 600 mg up to 3 times daily as needed for joint discomfort.  3.  Visit with nonoperative sports orthopedics to discuss therapeutic approaches to recently documented lumbar stenosis.  4.  Continue follow-up with neurology for headache management.  5.  Monitor for new or worsening symptoms in the skin, chest, abdomen.  6.  Use heat pack to inside of both elbows twice daily for 15 minutes.  Consider whether sleep positioning could exacerbate tension on the tendon attachments at the elbow.    "

## 2020-06-09 NOTE — PROGRESS NOTES
"Video-Visit Details    Type of service:  Video Visit    Video Start Time (time video started):3:56 PM    Video End Time (time video stopped): 4:14 PM      Originating Location (pt. Location): Home via email [email or web address]    Distant Location (provider location):  [facility] NEUROLOGY     Mode of Communication:  Video Conference via [facility]    Physician has received verbal consent for a Video Visit from the patient? Yes        Reason for visit:  Headache follow-up. This visit was conducted via synchronous video visit due to the current COVID-19 crisis to reduce patient risk.  Verbal consent was obtained.     Headche Interval History:  Onset History: patient reports history of headaches since childhood. Father passed away when patient was 3 years old from a suicide and had migraine headaches. Patient reports that headaches would be once per week and manageable until 2 years ago.   Patient reports that headaches became almost daily.   Patient reports that she still gets about 18 headache days  in February and in March 15 headache days reports one headache for 2 weeks straight. Patient reports that topiramate 100 mg at bedtime and no side effects but still gets consistent headaches. Reports that she is still feeling fatigues and joint pain and has been meeting with her rheumatologist. Patient reports that missed work 1-2 days but pushes thru a lot of times.   Treatments Tried:   Duloxetine for mood and generalized pain and did not help with pain and caused hyper hydrosis and has been decreasing it  Wellbutrin just started on Nov 6th    Amitriptyline did not help  Prednisone trial in July and did not help with headaches and pain and off  Sumatriptan helps and avoids because causes \"nausea\" and may be used 5 of the tablets for the past year  Sumatriptan injection-costly and never picked it up  Compazine did not help  Excedrin 1-2 times per month  Quit coffee a month ago and did not make any difference  Patient " denies frequent use of sumatriptan and ibuprofen a couple times per week -no risk of rebound headaches  Topiramate 100 mg at bedtime and still consistent headaches  Would avoid beta blockers or CCB due to blood pressure usually on the lower side -90s to low 100s.       Plan :  Migraine headache prevention  Continue topiramate  A trial of Botox for chronic migraine headache prevention  Acute migraine headache treatment -a trial of eletriptan as needed for acute migraine treatment. Ondansetron as needed for nausea.   Follow up after Botox 2 rounds -patient changes her insurance/jobs and will call us back once she gets a new job    PMH, Allergies, Prescription medications reviewed    I discussed all my recommendation/plan with Leny. The patient verbalizes understanding and comfortable with the plan. The patient has our clinic phone number to call with any questions or concerns. All of the patient's questions were answered from the best of my current knowledge.       I spent a total of 18 minutes for telemedicine consult with Leny Jeffrey during today s video meeting. Over 50% of this time was spent counseling the patient and/or coordinating care    SKY Sena [facility]

## 2020-06-09 NOTE — LETTER
6/9/2020       RE: [facility]  [address] [facility] [address]     Dear Colleague,    Thank you for referring your patient, Leny Jeffrey, to the [facility] NEUROLOGY at [facility]. Please see a copy of my visit note below.    Video-Visit Details    Type of service:  Video Visit    Video Start Time (time video started):3:56 PM    Video End Time (time video stopped): 4:14 PM      Originating Location (pt. Location): Home via email [email or web address]    Distant Location (provider location):  [facility] NEUROLOGY     Mode of Communication:  Video Conference via [facility]    Physician has received verbal consent for a Video Visit from the patient? Yes        Reason for visit:  Headache follow-up. This visit was conducted via synchronous video visit due to the current COVID-19 crisis to reduce patient risk.  Verbal consent was obtained.     Headche Interval History:  Onset History: patient reports history of headaches since childhood. Father passed away when patient was 3 years old from a suicide and had migraine headaches. Patient reports that headaches would be once per week and manageable until 2 years ago.   Patient reports that headaches became almost daily.   Patient reports that she still gets about 18 headache days  in February and in March 15 headache days reports one headache for 2 weeks straight. Patient reports that topiramate 100 mg at bedtime and no side effects but still gets consistent headaches. Reports that she is still feeling fatigues and joint pain and has been meeting with her rheumatologist. Patient reports that missed work 1-2 days but pushes thru a lot of times.   Treatments Tried:   Duloxetine for mood and generalized pain and did not help with pain and caused hyper hydrosis and has been decreasing it  Wellbutrin just started on Nov 6th    Amitriptyline did not help  Prednisone trial in July and did not help with headaches and pain and  "off  Sumatriptan helps and avoids because causes \"nausea\" and may be used 5 of the tablets for the past year  Sumatriptan injection-costly and never picked it up  Compazine did not help  Excedrin 1-2 times per month  Quit coffee a month ago and did not make any difference  Patient denies frequent use of sumatriptan and ibuprofen a couple times per week -no risk of rebound headaches  Topiramate 100 mg at bedtime and still consistent headaches  Would avoid beta blockers or CCB due to blood pressure usually on the lower side -90s to low 100s.       Plan :  Migraine headache prevention  Continue topiramate  A trial of Botox for chronic migraine headache prevention  Acute migraine headache treatment -a trial of eletriptan as needed for acute migraine treatment. Ondansetron as needed for nausea.   Follow up after Botox 2 rounds -patient changes her insurance/jobs and will call us back once she gets a new job    PMH, Allergies, Prescription medications reviewed    I discussed all my recommendation/plan with Leny. The patient verbalizes understanding and comfortable with the plan. The patient has our clinic phone number to call with any questions or concerns. All of the patient's questions were answered from the best of my current knowledge.       I spent a total of 18 minutes for telemedicine consult with Leny Jeffrey during today s video meeting. Over 50% of this time was spent counseling the patient and/or coordinating care    SKY Sena [facility]  [facility]          "

## 2020-06-09 NOTE — PATIENT INSTRUCTIONS
Plan:  Migraine headache prevention  Continue topiramate  A trial of Botox for chronic migraine headache prevention. patient changes her insurance/jobs and will call us back once she gets a new job  Acute migraine headache treatment -a trial of eletriptan as needed for acute migraine treatment. Ondansetron as needed for nausea.   Follow up after Botox 2 rounds         Patient Education     OnabotulinumtoxinA injection (Medical Use)  Brand Name: Botox  What is this medicine?  ONABOTULINUMTOXINA (o na NOHEMI you lye num tox in ) is a neuro-muscular blocker. This medicine is used to treat crossed eyes, eyelid spasms, severe neck muscle spasms, ankle and toe muscle spasms, and elbow, wrist, and finger muscle spasms. It is also used to treat excessive underarm sweating, to prevent chronic migraine headaches, and to treat loss of bladder control due to neurologic conditions such as multiple sclerosis or spinal cord injury.  How should I use this medicine?  This medicine is for injection into a muscle. It is given by a health care professional in a hospital or clinic setting.  Talk to your pediatrician regarding the use of this medicine in children. While this drug may be prescribed for children as young as 12 years old for selected conditions, precautions do apply.  What side effects may I notice from receiving this medicine?  Side effects that you should report to your doctor or health care professional as soon as possible:    allergic reactions like skin rash, itching or hives, swelling of the face, lips, or tongue    breathing problems    changes in vision    chest pain or tightness    eye irritation, pain    fast, irregular heartbeat    infection    numbness    speech problems    swallowing problems    unusual weakness  Side effects that usually do not require medical attention (report to your doctor or health care professional if they continue or are bothersome):    bruising or pain at site where injected    drooping  eyelid    dry eyes or mouth    headache    muscles aches, pains    sensitivity to light    tearing  What may interact with this medicine?    aminoglycoside antibiotics like gentamicin, neomycin, tobramycin    muscle relaxants    other botulinum toxin injections    What if I miss a dose?  This does not apply.  Where should I keep my medicine?  This drug is given in a hospital or clinic and will not be stored at home.  What should I tell my health care provider before I take this medicine?  They need to know if you have any of these conditions:    breathing problems    cerebral palsy spasms    difficulty urinating    heart problems    history of surgery where this medicine is going to be used    infection at the site where this medicine is going to be used    myasthenia gravis or other neurologic disease    nerve or muscle disease    surgery plans    take medicines that treat or prevent blood clots    thyroid problems    an unusual or allergic reaction to botulinum toxin, albumin, other medicines, foods, dyes, or preservatives    pregnant or trying to get pregnant    breast-feeding  What should I watch for while using this medicine?  Visit your doctor for regular check ups.  This medicine will cause weakness in the muscle where it is injected. Tell your doctor if you feel unusually weak in other muscles. Get medical help right away if you have problems with breathing, swallowing, or talking.  This medicine might make your eyelids droop or make you see blurry or double. If you have weak muscles or trouble seeing do not drive a car, use machinery, or do other dangerous activities.  This medicine contains albumin from human blood. It may be possible to pass an infection in this medicine, but no cases have been reported. Talk to your doctor about the risks and benefits of this medicine.  If your activities have been limited by your condition, go back to your regular routine slowly after treatment with this  medicine.  NOTE:This sheet is a summary. It may not cover all possible information. If you have questions about this medicine, talk to your doctor, pharmacist, or health care provider. Copyright  2019 [employer]

## 2020-06-09 NOTE — LETTER
2020       [facility]: Len[facility] Jeffrey  [address] [facility] [address]     Dear Colleague,    Thank you for referring your patient, Leny Jeffrey, to the [facility] RHEUMATOLOGY at [facility]. Please see a copy of my visit note below.    [facility]  Rheumatology Clinic  Rene Trevino MD  2020     Name: Leny Jeffrey  MRN: [id number]  Age: 38 year old  : 1980  Referring provider: Carmela Her     Assessment and Plan:  # Diffuse joint pain, fatigue, headaches, facial rashes, positive MICHELLE:   Patient relates persistent diffuse joint aches, daytime fatigue, dysphoria, but improved intermittent facial rashes (after stopping caffeine). Video exam shows no swelling in visible hand joints, and is otherwise unremarkable. Labs from 2019 show that kidney function, liver function, CBC, CRP, RF and CCP were negative or normal. Urine was clear. MICHELLE was low positive at 1:160. Double stranded DNA and JAMES panel were negative. X-ray of the pelvis from 2019 showed excellent preservation of joint margins with no evidence of erosion.    Although patient has documented low positive MICHELLE, the clinical picture does not add up to a formal diagnosis of systemic lupus or other autoimmune disorder that would benefit from immunomodulatory therapy. The lack of improvement in joint pain following a trial course of low dose prednisone in Spring 2019 argued against a significant inflammatory component of the patient's ongoing pain and fatigue. Given diffuse. musculoskeletal symptoms and positive MICHELLE, the patient remains at slightly increased risk of developing autoimmunity. I recommend screening visit with rheumatology every 6-12 months to review symptoms and check CBC, creatinine, and urinalysis.    2.  Chronic low back pain, worsening. No inflammatory or neuropathic features. Possibly related to stenosis.  Agree with physical therapy, and daily flexion extension exercises for  "likely muscle pain generators.  Referral to nonoperative sports medicine for evaluation.  3.  Chronic headaches, migrainous  4.  Medial epicondylitis: bilateral, not likely related to systemic rheumatic disease.     Plan:  1.  Recheck blood counts, creatinine, liver function, inflammatory markers, and urine for signs of visceral involvement by inflammatory or autoimmune disease.  2.  Continue regular physical exercise, get good quality sleep, utilize ibuprofen 400 to 600 mg up to 3 times daily as needed for joint discomfort; I see that the patient is using Wellbutrin for mood disorder.  If it has not already been tried through primary care, I recommend a trial of duloxetine for both analgesic and mood altering properties.  3.  Visit with nonoperative sports orthopedics to discuss therapeutic approaches to recently documented lumbar stenosis.  4.  Continue follow-up with neurology for headache management.  5.  Monitor for new or worsening symptoms in the skin, chest, abdomen.  6.  Use heat pack to inside of both elbows twice daily for 15 minutes.  Consider whether sleep positioning could exacerbate tension on the tendon attachments at the elbow.    Follow-up: 6 mos    HPI:   Leny Jeffrye is a 39 year old female with a history of muscle pain, fatigue, and frequent headaches who presents for follow up. Last seen in 8-2019, when watchful waiting was continued.    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  She still has headaches, no change in intensity.  Low back pain continues; MRI showed \"stenosis\" in lumbar spine.   Fingers are still painful; not tender, but diffusely fatigued. ADLs are not particularly affected; she doesn't have full strength to lift and pull. In the mornings, hands and feet are stiff and \"tired\" in the mornings, lasting 15-30 minutes. But tiredness does not resolve through the day.  She does take ibuprofen for headaches, " but does not feel that it helps her joint pain.  Acetaminophen does not help joint symptoms.  Back pain is dominant over others; she went to [facility] until COVID19.    She stopped drinking coffee, and thinks that raised patches on her neck have decreased since.    Prior history:    HX 8-2019:  Today the patient reports that she did not notice significant change in diffuse joint or muscle pain on the two week course of prednisone, although she did feel more achy a couple of days after stopping it. She continues to have fatigue throughout the day and headaches and notes that she developed a headache yesterday, for which she used naproxen. She continues to have finger and toe pain and stiffness without visible swelling. Naproxen did not help with finger pain. She is using her hands on a daily basis. She reports good sleep quality overall. She also reports areas of dryness to the face, neck, and arms but denies skin rashes otherwise. Cell phone photogram from 07/27/2019 shows some poorly marginated erythema over the right malar area. No ulcerations or comedonal regions. The area is not itchy. She saw a dermatologist last year who recommended salicylic acid lotion, moisturizing cream, and hydrocortisone 1%. No cough or shortness of breath.    She reports that over the past 3 years, her mood and attention span has been low. She reports that she feels drained and feels overall apathy for life. She has not had the energy to exercise, which has previously helped with her mood. She tried sertraline in the past but this was no effective.     Review of Systems:   Pertinent items are noted in HPI or as below, remainder of complete ROS is negative.      No recent problems with hearing or vision. No swallowing problems.   No breathing difficulty, shortness of breath, coughing, or wheezing  No chest pain or palpitations  No heart burn, indigestion, abdominal pain, nausea, vomiting, diarrhea  No urination problems, no bloody, cloudy  urine, no dysuria  No numbing, tingling, weakness  No confusion  No easy bleeding or bruising; + spotting between her periods    Active Medications:     Current Outpatient Medications:      ascorbic acid 500 MG TABS, Take 250 mg by mouth every other day, Disp: , Rfl:      buPROPion (WELLBUTRIN XL) 150 MG 24 hr tablet, Take 300 mg by mouth every morning , Disp: , Rfl:      Calcium Carb-Cholecalciferol (CALCIUM 600/VITAMIN D3) 600-800 MG-UNIT TABS, Take 1 tablet by mouth daily , Disp: , Rfl:      Ferrous Sulfate (IRON) 325 (65 Fe) MG tablet, Take 1 tablet by mouth every other day , Disp: , Rfl:      magnesium 250 MG tablet, Take 1 tablet by mouth every other day , Disp: , Rfl:      ondansetron (ZOFRAN-ODT) 4 MG ODT tab, Take 1 tablet (4 mg) by mouth every 6 hours as needed for nausea, Disp: 20 tablet, Rfl: 6     prochlorperazine (COMPAZINE) 5 MG tablet, Take 0.5-1 tablets (2.5-5 mg) by mouth every 6 hours as needed for nausea or vomiting, Disp: 20 tablet, Rfl: 3     SUMAtriptan (IMITREX STATDOSE) 6 MG/0.5ML pen injector kit, Inject 0.5 mLs (6 mg) Subcutaneous at onset of headache for migraine (may repeat in one hour as needed. Max 12 mg in 24 hours), Disp: 3 kit, Rfl: 9     topiramate (TOPAMAX) 25 MG tablet, TAKE 4 TABS (100MG) at bedtime, Disp: 120 tablet, Rfl: 6     vitamin C (ASCORBIC ACID) 500 MG tablet, Take 250 mg by mouth every other day , Disp: , Rfl:      vitamin D3 (CHOLECALCIFEROL) 2000 units (50 mcg) tablet, Take 2,000 Units by mouth daily, Disp: , Rfl:       Allergies:   Patient has no known allergies.      Past Medical History:  Headache  Premenstrual dysphoric disorder     Past Surgical History:  CO repair of nasal septum  Cervical biopsy w/ loop electrode excision  [place] tooth extraction     Family History:   Father: Mental illness, alcohol abuse  Maternal grandfather: Cancer  Maternal grandmother: Colon cancer  Paternal grandfather: Heart attack  Paternal grandmother: Heart disease.  No family  history of autoimmune disease.   Prophyria in Aunt and father.      Social History:   Never smoked.  Occasional alcohol use.  Has 2 children.     Physical Exam:   There were no vitals taken for this visit.   Wt Readings from Last 4 Encounters:   11/22/19 87.1 kg (192 lb 1.6 oz)   08/02/19 84.8 kg (187 lb)   06/28/19 84.6 kg (186 lb 9.6 oz)     Constitutional: Well-developed, appearing stated age; cooperative  Eyes: Normal EOM, PERRLA, vision, conjunctiva, sclera  ENT: Normal external ears, nose, hearing, lips, teeth, gums, throat. No mucous membrane lesions, normal saliva pool  Neck: No mass or thyroid enlargement  MS: Normal alignment of PIPs and MCPs; no visible swelling at the joints of the hands or fingers; fist formation excellent.  Patient points out points of maximum tenderness at inner elbows that correlate with the medial epicondyles.  Skin: No nail pitting, alopecia, rash, nodules or lesions  Neuro: Normal cranial nerves, strength  Psych: Normal judgement, orientation, memory, affect.     Laboratory:   RHEUM RESULTS Latest Ref Rng & Units 6/28/2019   CRP, INFLAMMATION 0.0 - 8.0 mg/L <2.9   CK TOTAL 30 - 225 U/L 89   RHEUMATOID FACTOR <20 IU/mL <20   AST 0 - 45 U/L 16   ALT 0 - 50 U/L 26   WBC 4.0 - 11.0 10e9/L 6.8   RBC 3.8 - 5.2 10e12/L 4.97   HGB 11.7 - 15.7 g/dL 15.2   HCT 35.0 - 47.0 % 45.3   MCV 78 - 100 fl 91   MCHC 31.5 - 36.5 g/dL 33.6   RDW 10.0 - 15.0 % 12.4    - 450 10e9/L 232   CREATININE 0.52 - 1.04 mg/dL 0.80   GFR ESTIMATE, IF BLACK >60 mL/min/[1.73:m2] >90   GFR ESTIMATE >60 mL/min/[1.73:m2] >90       Rheumatoid Factor   Date Value Ref Range Status   06/28/2019 <20 <20 IU/mL Final     Cyclic Citrullinated Peptide Antibody, IgG   Date Value Ref Range Status   06/28/2019 1 <7 U/mL Final     Comment:     Negative     MICHELLE interpretation   Date Value Ref Range Status   06/28/2019 Positive (A) NEG^Negative Final     Comment:                                        Reference range:  <1:40   "NEGATIVE  1:40 - 1:80  BORDERLINE POSITIVE  >1:80 POSITIVE       MICHELLE pattern 1   Date Value Ref Range Status   06/28/2019 NUCLEAR DOTS  Final     MICHELLE titer 1   Date Value Ref Range Status   06/28/2019 1:160  Final       Leny Jeffrey is a 39 year old female who is being evaluated via a billable video visit.      The patient has been notified of following:     \"This video visit will be conducted via a call between you and your physician/provider. We have found that certain health care needs can be provided without the need for an in-person physical exam.  This service lets us provide the care you need with a video conversation.  If a prescription is necessary we can send it directly to your pharmacy.  If lab work is needed we can place an order for that and you can then stop by our lab to have the test done at a later time.    Video visits are billed at different rates depending on your insurance coverage.  Please reach out to your insurance provider with any questions.    If during the course of the call the physician/provider feels a video visit is not appropriate, you will not be charged for this service.\"    Patient has given verbal consent for Video visit? Yes    How would you like to obtain your AVS? Mail a copy    Patient would like the video invitation sent by: Send to e-mail at: [email or web address]    Will anyone else be joining your video visit? No        Video-Visit Details    Type of service:  Video Visit    Video Start Time: 8:09 AM  Video End Time: 8:46 AM    Originating Location (pt. Location): Home    Distant Location (provider location):  [facility] RHEUMATOLOGY     Platform used for Video Visit: Fiona Trevino MD    "

## 2020-06-09 NOTE — PROGRESS NOTES
"Leny Jeffrey is a 39 year old female who is being evaluated via a billable video visit.      The patient has been notified of following:     \"This video visit will be conducted via a call between you and your physician/provider. We have found that certain health care needs can be provided without the need for an in-person physical exam.  This service lets us provide the care you need with a video conversation.  If a prescription is necessary we can send it directly to your pharmacy.  If lab work is needed we can place an order for that and you can then stop by our lab to have the test done at a later time.    Video visits are billed at different rates depending on your insurance coverage.  Please reach out to your insurance provider with any questions.    If during the course of the call the physician/provider feels a video visit is not appropriate, you will not be charged for this service.\"    Patient has given verbal consent for Video visit? {YES-NO  Default Yes:4444::\"Yes\"}    How would you like to obtain your AVS? {AVS Preference:036836}    Patient would like the video invitation sent by: {video visit invitation:202640}    Will anyone else be joining your video visit? {:[id number]}  {If patient encounters technical issues they should call [phone] :[id number]}      Video-Visit Details    Type of service:  Video Visit    Video Start Time: {video visit start/end time:152948}  Video End Time: {video visit start/end time:152948}    Originating Location (pt. Location): {video visit patient location:983158::\"Home\"}    Distant Location (provider location):   [facility] NEUROLOGY     Platform used for Video Visit: {Virtual Visit Platforms:535787::\"[employer]\"}    {signature options:264516}        "

## 2020-06-10 ENCOUNTER — COMMUNICATION - HEALTHEAST (OUTPATIENT)
Dept: LAB | Facility: CLINIC | Age: 40
End: 2020-06-10

## 2020-06-11 ENCOUNTER — HOSPITAL ENCOUNTER (OUTPATIENT)
Dept: ULTRASOUND IMAGING | Facility: CLINIC | Age: 40
Discharge: HOME | End: 2020-06-11
Attending: ADVANCED PRACTICE MIDWIFE

## 2020-06-11 ENCOUNTER — AMBULATORY - HEALTHEAST (OUTPATIENT)
Dept: LAB | Facility: CLINIC | Age: 40
End: 2020-06-11

## 2020-06-11 DIAGNOSIS — M25.50 ARTHRALGIA, UNSPECIFIED JOINT: ICD-10-CM

## 2020-06-11 DIAGNOSIS — N93.9 ABNORMAL UTERINE BLEEDING (AUB): ICD-10-CM

## 2020-06-11 LAB
ALBUMIN UR-MCNC: NEGATIVE MG/DL
ALT SERPL W P-5'-P-CCNC: 17 U/L (ref 0–45)
APPEARANCE UR: CLEAR
AST SERPL W P-5'-P-CCNC: 14 U/L (ref 0–40)
BACTERIA #/AREA URNS HPF: ABNORMAL HPF
BILIRUB UR QL STRIP: NEGATIVE
COLOR UR AUTO: YELLOW
CREAT SERPL-MCNC: 1 MG/DL (ref 0.6–1.1)
ERYTHROCYTE [DISTWIDTH] IN BLOOD BY AUTOMATED COUNT: 12.1 % (ref 11–14.5)
GFR SERPL CREATININE-BSD FRML MDRD: >60 ML/MIN/1.73M2
GLUCOSE UR STRIP-MCNC: NEGATIVE MG/DL
HCT VFR BLD AUTO: 44.4 % (ref 35–47)
HGB BLD-MCNC: 15.1 G/DL (ref 12–16)
HGB UR QL STRIP: ABNORMAL
KETONES UR STRIP-MCNC: NEGATIVE MG/DL
LEUKOCYTE ESTERASE UR QL STRIP: NEGATIVE
MCH RBC QN AUTO: 30.8 PG (ref 27–34)
MCHC RBC AUTO-ENTMCNC: 34.1 G/DL (ref 32–36)
MCV RBC AUTO: 90 FL (ref 80–100)
NITRATE UR QL: NEGATIVE
PH UR STRIP: 5.5 [PH] (ref 5–8)
PLATELET # BLD AUTO: 282 THOU/UL (ref 140–440)
PMV BLD AUTO: 8.3 FL (ref 7–10)
RBC # BLD AUTO: 4.92 MILL/UL (ref 3.8–5.4)
RBC #/AREA URNS AUTO: ABNORMAL HPF
SP GR UR STRIP: 1.02 (ref 1–1.03)
SQUAMOUS #/AREA URNS AUTO: ABNORMAL LPF
UROBILINOGEN UR STRIP-ACNC: ABNORMAL
WBC #/AREA URNS AUTO: ABNORMAL HPF
WBC: 7.3 THOU/UL (ref 4–11)

## 2020-07-06 ASSESSMENT — HEADACHE IMPACT TEST (HIT 6)
WHEN YOU HAVE A HEADACHE HOW OFTEN DO YOU WISH YOU COULD LIE DOWN: VERY OFTEN
HIT6 TOTAL SCORE: 65
HOW OFTEN HAVE YOU FELT TOO TIRED TO WORK BECAUSE OF YOUR HEADACHES: VERY OFTEN
HOW OFTEN HAVE YOU FELT FED UP OR IRRITATED BECAUSE OF YOUR HEADACHES: VERY OFTEN
HOW OFTEN DID HEADACHS LIMIT CONCENTRATION ON WORK OR DAILY ACTIVITY: VERY OFTEN
HOW OFTEN DO HEADACHES LIMIT YOUR DAILY ACTIVITIES: VERY OFTEN
WHEN YOU HAVE HEADACHES HOW OFTEN IS THE PAIN SEVERE: SOMETIMES

## 2020-07-08 ENCOUNTER — RECORDS - HEALTHEAST (OUTPATIENT)
Dept: ADMINISTRATIVE | Facility: OTHER | Age: 40
End: 2020-07-08

## 2020-07-08 ENCOUNTER — OFFICE VISIT (OUTPATIENT)
Dept: NEUROLOGY | Facility: CLINIC | Age: 40
End: 2020-07-08
Payer: COMMERCIAL

## 2020-07-08 DIAGNOSIS — G43.719 INTRACTABLE CHRONIC MIGRAINE WITHOUT AURA AND WITHOUT STATUS MIGRAINOSUS: Primary | ICD-10-CM

## 2020-07-08 RX ORDER — RIZATRIPTAN BENZOATE 5 MG/1
5-10 TABLET, ORALLY DISINTEGRATING ORAL
Qty: 18 TABLET | Refills: 6 | Status: SHIPPED | OUTPATIENT
Start: 2020-07-08 | End: 2021-09-24

## 2020-07-08 NOTE — LETTER
2020       RE: Leny Jeffrey  [address]  [facility] [address]     Dear Colleague,    Thank you for referring your patient, Leny Jeffrey, to the [facility] NEUROLOGY at [facility]. Please see a copy of my visit note below.    [facility] NEUROLOGY  [address] [address]  [phone]  Dept: [phone]  ______________________________________________________________________________    Patient: Leny Jeffrey   : 1980   MRN: [id number]   2020    INVASIVE PROCEDURE SAFETY CHECKLIST    Date: 2020   Procedure:Botox injections for chronic migraine headaches  Patient Name: Leny Jeffrey  MRN: [id number]  YOB: 1980    Action: Complete sections as appropriate. Any discrepancy results in a HARD COPY until resolved.     PRE PROCEDURE:  Patient ID verified with 2 identifiers (name and  or MRN): Yes  Procedure and site verified with patient/designee (when able): Yes  Accurate consent documentation in medical record: Yes  H&P (or appropriate assessment) documented in medical record: Yes  H&P must be up to 20 days prior to procedure and updates within 24 hours of procedure as applicable: NA  Relevant diagnostic and radiology test results appropriately labeled and displayed as applicable: NA  Procedure site(s) marked with provider initials: NA    TIMEOUT:  Time-Out performed immediately prior to starting procedure, including verbal and active participation of all team members addressing the following:Yes  * Correct patient identify  * Confirmed that the correct side and site are marked  * An accurate procedure consent form  * Agreement on the procedure to be done  * Correct patient position  * Relevant images and results are properly labeled and appropriately displayed  * The need to administer antibiotics or fluids for irrigation purposes during the procedure as applicable   * Safety precautions based on patient  "history or medication use    DURING PROCEDURE: Verification of correct person, site, and procedures any time the responsibility for care of the patient is transferred to another member of the care team.           PROCEDURE NOTE:    RESPONSE TO PREVIOUS TREATMENT:  Half of the month headaches, pain 5-6 and duration most of the day over 6 hours. Headaches affecting patient's daily life  Did not respond to commonly used preventive treatment   Treatments Tried:   Duloxetine for mood and generalized pain and did not help with pain and caused hyper hydrosis and has been decreasing it  Wellbutrin just started on Nov 6th    Amitriptyline did not help  Prednisone trial in July and did not help with headaches and pain and off  Sumatriptan helps and avoids because causes \"nausea\" and may be used 5 of the tablets for the past year  Sumatriptan injection-costly and never picked it up  Compazine did not help  Excedrin 1-2 times per month  Quit coffee a month ago and did not make any difference  Patient denies frequent use of sumatriptan and ibuprofen a couple times per week -no risk of rebound headaches  Topiramate 100 mg at bedtime and still consistent headaches  Would avoid beta blockers or CCB due to blood pressure usually on the lower side -90s to low 100s.      BOTULINUM NEUROTOXIN INJECTION PROCEDURES:  DATA:7/8/2020  INDICATIONS FOR PROCEDURES:   chronic migraine headaches.  Patient is here today for a repeat injection with Botox.    GOAL OF PROCEDURE:  The goal of this procedure is to decrease pain and enhance functional independence.    TOTAL DOSE ADMINISTERED:  Dose Administered:  155 units  Botox (Botulinum Toxin Type A)       2:1 Dilution    Diluent Used:  Preservative Free Normal Saline LOT -dk EXP 1 JAN 2021  Total Volume of Diluent Used:  4 ml  Lot # [id number]   with Expiration Date: 02/2023   NDC 5229-6974-82  Wasted 45 units  Medication guide was offered to patient and was declined.    CONSENT:  The risks, " benefits, and treatment options were discussed with the patient who agreed to proceed.    Written consent was obtained     EQUIPMENT USED:  Needles-30 gauge, 0.5 inches for injections  Four 1-ml tuberculin syringes for injections  One sodium chloride 10 ml vial preservative free  Alcohol swabs    SKIN PREPARATION:  Skin preparation was performed using an alcohol wipe.      AREA/MUSCLE INJECTED:  155 units of Botox  1 & 2. SHOULDER GIRDLE & NECK MUSCLES: total of 80 units Botox = Total Dose, 2:1 Dilution   Right upper Trapezius (upper cervical) - 5 units of Botox at 3 site/s.   Left upper Trapezius (upper cervical) - 5 units of Botox at 3 site/s.     Right cervical paraspinals - 5 units of Botox at 2 site/s.   Left cervical paraspinals - 5 units of Botox at 2 site/s.     Left occipitalis - 5 units of Botox at 3 site/s.  Right occipitalis -5 units of Botox at 3 site/s    3. HEAD & SCALP MUSCLES: total of 75 units Botox = Total Dose, 2:1 Dilution  Right Frontalis - 5 units of Botox at 2 site/s.  Left Frontalis - 5 units of Botox at 2 site/s.    Right Temporalis - 5 units of Botox at 4 site/s.  Left Temporalis - 5 units of Botox at 4 site/s.    Right  - 5 units of Botox at 1 site/s.              Left  - 5 units of Botox at 1 site/s.    Procerus - 5 units of Botox at 1 site/s.    RESPONSE TO PROCEDURE:  tolerated the procedure well and there were no immediate complications.  Patient was allowed to recover for an appropriate period of time and was discharged home in stable condition.    FOLLOW UP:    Repeat Botox injections in 12 weeks      This procedure was performed under a hospital privileging agreement with Dr. Humza Mcdaniels, APRN, [facility]

## 2020-07-08 NOTE — PROGRESS NOTES
[facility] NEUROLOGY  [address] [place] [id number]  [phone]  Dept: [phone]  ______________________________________________________________________________    Patient: Leny Jeffrey   : 1980   MRN: [id number]   2020    INVASIVE PROCEDURE SAFETY CHECKLIST    Date: 2020   Procedure:Botox injections for chronic migraine headaches  Patient Name: Leny Jeffrey  MRN: [id number]  YOB: 1980    Action: Complete sections as appropriate. Any discrepancy results in a HARD COPY until resolved.     PRE PROCEDURE:  Patient ID verified with 2 identifiers (name and  or MRN): Yes  Procedure and site verified with patient/designee (when able): Yes  Accurate consent documentation in medical record: Yes  H&P (or appropriate assessment) documented in medical record: Yes  H&P must be up to 20 days prior to procedure and updates within 24 hours of procedure as applicable: NA  Relevant diagnostic and radiology test results appropriately labeled and displayed as applicable: NA  Procedure site(s) marked with provider initials: NA    TIMEOUT:  Time-Out performed immediately prior to starting procedure, including verbal and active participation of all team members addressing the following:Yes  * Correct patient identify  * Confirmed that the correct side and site are marked  * An accurate procedure consent form  * Agreement on the procedure to be done  * Correct patient position  * Relevant images and results are properly labeled and appropriately displayed  * The need to administer antibiotics or fluids for irrigation purposes during the procedure as applicable   * Safety precautions based on patient history or medication use    DURING PROCEDURE: Verification of correct person, site, and procedures any time the responsibility for care of the patient is transferred to another member of the care team.           PROCEDURE NOTE:    RESPONSE TO PREVIOUS TREATMENT:  Half of the  "month headaches, pain 5-6 and duration most of the day over 6 hours. Headaches affecting patient's daily life  Did not respond to commonly used preventive treatment   Treatments Tried:   Duloxetine for mood and generalized pain and did not help with pain and caused hyper hydrosis and has been decreasing it  Wellbutrin just started on Nov 6th    Amitriptyline did not help  Prednisone trial in July and did not help with headaches and pain and off  Sumatriptan helps and avoids because causes \"nausea\" and may be used 5 of the tablets for the past year  Sumatriptan injection-costly and never picked it up  Compazine did not help  Excedrin 1-2 times per month  Quit coffee a month ago and did not make any difference  Patient denies frequent use of sumatriptan and ibuprofen a couple times per week -no risk of rebound headaches  Topiramate 100 mg at bedtime and still consistent headaches  Would avoid beta blockers or CCB due to blood pressure usually on the lower side -90s to low 100s.      BOTULINUM NEUROTOXIN INJECTION PROCEDURES:  DATA:7/8/2020  INDICATIONS FOR PROCEDURES:   chronic migraine headaches.  Patient is here today for a repeat injection with Botox.    GOAL OF PROCEDURE:  The goal of this procedure is to decrease pain and enhance functional independence.    TOTAL DOSE ADMINISTERED:  Dose Administered:  155 units  Botox (Botulinum Toxin Type A)       2:1 Dilution    Diluent Used:  Preservative Free Normal Saline LOT -dk EXP 1 JAN 2021  Total Volume of Diluent Used:  4 ml  Lot # [id number]   with Expiration Date: 02/2023   NDC 8501-9803-97  Wasted 45 units  Medication guide was offered to patient and was declined.    CONSENT:  The risks, benefits, and treatment options were discussed with the patient who agreed to proceed.    Written consent was obtained     EQUIPMENT USED:  Needles-30 gauge, 0.5 inches for injections  Four 1-ml tuberculin syringes for injections  One sodium chloride 10 ml vial preservative " free  Alcohol swabs    SKIN PREPARATION:  Skin preparation was performed using an alcohol wipe.      AREA/MUSCLE INJECTED:  155 units of Botox  1 & 2. SHOULDER GIRDLE & NECK MUSCLES: total of 80 units Botox = Total Dose, 2:1 Dilution   Right upper Trapezius (upper cervical) - 5 units of Botox at 3 site/s.   Left upper Trapezius (upper cervical) - 5 units of Botox at 3 site/s.     Right cervical paraspinals - 5 units of Botox at 2 site/s.   Left cervical paraspinals - 5 units of Botox at 2 site/s.     Left occipitalis - 5 units of Botox at 3 site/s.  Right occipitalis -5 units of Botox at 3 site/s    3. HEAD & SCALP MUSCLES: total of 75 units Botox = Total Dose, 2:1 Dilution  Right Frontalis - 5 units of Botox at 2 site/s.  Left Frontalis - 5 units of Botox at 2 site/s.    Right Temporalis - 5 units of Botox at 4 site/s.  Left Temporalis - 5 units of Botox at 4 site/s.    Right  - 5 units of Botox at 1 site/s.              Left  - 5 units of Botox at 1 site/s.    Procerus - 5 units of Botox at 1 site/s.    RESPONSE TO PROCEDURE:  tolerated the procedure well and there were no immediate complications.  Patient was allowed to recover for an appropriate period of time and was discharged home in stable condition.    FOLLOW UP:    Repeat Botox injections in 12 weeks      This procedure was performed under a hospital privileging agreement with Dr. Humza Mcdaniels, APRN, CNP  [facility]

## 2020-07-15 ENCOUNTER — RECORDS - HEALTHEAST (OUTPATIENT)
Dept: ADMINISTRATIVE | Facility: OTHER | Age: 40
End: 2020-07-15

## 2020-07-28 ENCOUNTER — AMBULATORY - HEALTHEAST (OUTPATIENT)
Dept: OBGYN | Facility: CLINIC | Age: 40
End: 2020-07-28

## 2020-07-28 ENCOUNTER — TRANSFERRED RECORDS (OUTPATIENT)
Dept: HEALTH INFORMATION MANAGEMENT | Facility: CLINIC | Age: 40
End: 2020-07-28
Payer: COMMERCIAL

## 2020-07-28 ENCOUNTER — COMMUNICATION - HEALTHEAST (OUTPATIENT)
Dept: ADMINISTRATIVE | Facility: CLINIC | Age: 40
End: 2020-07-28

## 2020-07-28 DIAGNOSIS — Z30.8 ENCOUNTER FOR OTHER CONTRACEPTIVE MANAGEMENT: ICD-10-CM

## 2020-07-30 ENCOUNTER — VIRTUAL VISIT (OUTPATIENT)
Dept: FAMILY MEDICINE | Facility: OTHER | Age: 40
End: 2020-07-30
Payer: COMMERCIAL

## 2020-07-30 ENCOUNTER — COMMUNICATION - HEALTHEAST (OUTPATIENT)
Dept: OBGYN | Facility: CLINIC | Age: 40
End: 2020-07-30

## 2020-07-30 DIAGNOSIS — Z20.822 ENCOUNTER FOR LABORATORY TESTING FOR COVID-19 VIRUS: Primary | ICD-10-CM

## 2020-07-30 PROCEDURE — 99421 OL DIG E/M SVC 5-10 MIN: CPT | Performed by: PHYSICIAN ASSISTANT

## 2020-07-30 PROCEDURE — U0003 INFECTIOUS AGENT DETECTION BY NUCLEIC ACID (DNA OR RNA); SEVERE ACUTE RESPIRATORY SYNDROME CORONAVIRUS 2 (SARS-COV-2) (CORONAVIRUS DISEASE [COVID-19]), AMPLIFIED PROBE TECHNIQUE, MAKING USE OF HIGH THROUGHPUT TECHNOLOGIES AS DESCRIBED BY CMS-2020-01-R: HCPCS | Performed by: FAMILY MEDICINE

## 2020-07-30 NOTE — PROGRESS NOTES
"Date: 2020 10:33:50  Clinician: Evgeny Reyes  Clinician NPI: [id number]  Patient: Leny Jeffrey  Patient : 1980  Patient Address: [address] [place] [address]  Patient Phone: [phone]  Visit Protocol: URI  Patient Summary:  Leny is a 39 year old ( : 1980 ) female who initiated a Visit for COVID-19 (Coronavirus) evaluation and screening. When asked the question \"Please sign me up to receive news, health information and promotions from [employer].\", Leny responded \"No\".    Leny states her symptoms started gradually 3-4 days ago.   Her symptoms consist of nausea, diarrhea, myalgia, facial pain or pressure, malaise, a headache, and enlarged lymph nodes.   Symptom details     Facial pain or pressure: The facial pain or pressure feels worse when bending over or leaning forward.     Headache: She states the headache is moderate (4-6 on a 10 point pain scale).      Leny denies having wheezing, teeth pain, ageusia, sore throat, anosmia, fever, cough, nasal congestion, vomiting, rhinitis, ear pain, and chills. She also denies having recent facial or sinus surgery in the past 60 days, double sickening (worsening symptoms after initial improvement), and taking antibiotic medication in the past month. She is not experiencing dyspnea.   Precipitating events  She has not recently been exposed to someone with influenza. Leny has been in close contact with the following high risk individuals: adults 65 or older.   Pertinent COVID-19 (Coronavirus) information  In the past 14 days, Leny has not worked in a congregate living setting.   She either works or volunteers as a healthcare worker or a , or works or volunteers in a healthcare facility. She provides direct patient care. 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 Haven't worked in two weeks.   Leny also has not lived in a congregate living " setting in the past 14 days. She lives with a healthcare worker.   Leny has not had a close contact with a laboratory-confirmed COVID-19 patient within 14 days of symptom onset.   Pertinent medical history  Leny does not get yeast infections when she takes antibiotics.   Leny needs a return to work/school note.   Weight: 163 lbs   Leny does not smoke or use smokeless tobacco.   She denies pregnancy and denies breastfeeding. She has menstruated in the past month.   Additional information as reported by the patient (free text): Had steroid epidural injection on 7/21/20   Weight: 163 lbs    MEDICATIONS: bupropion HCl oral, topiramate oral, ALLERGIES: NKDA  Clinician Response:  Dear Leny,   Your symptoms show that you may have coronavirus (COVID-19). This illness can cause fever, cough and trouble breathing. Many people get a mild case and get better on their own. Some people can get very sick.  What should I do?  We would like to test you for this virus.   1. Please call [phone] to schedule your visit. Explain that you were referred by [facility] to have a COVID-19 test. Be ready to share your [facility] visit ID number.  The following will serve as your written order for this COVID Test, ordered by me, for the indication of suspected COVID [Z20.828]: The test will be ordered in [employer], our electronic health record, after you are scheduled. It will show as ordered and authorized by Clayton Martin MD.  Order: COVID-19 (Coronavirus) PCR for SYMPTOMATIC testing from [facility].      2. When it's time for your COVID test:  Stay at least 6 feet away from others. (If someone will drive you to your test, stay in the backseat, as far away from the  as you can.)   Cover your mouth and nose with a mask, tissue or washcloth.  Go straight to the testing site. Don't make any stops on the way there or back.      3.Starting now: Stay home and away from others (self-isolate) until:   You've had no fever---and no medicine that reduces  "fever---for 3 full days (72 hours). And...   Your other symptoms have gotten better. For example, your cough or breathing has improved. And...   At least 10 days have passed since your symptoms started.       During this time, don't leave the house except for testing or medical care.   Stay in your own room, even for meals. Use your own bathroom if you can.   Stay away from others in your home. No hugging, kissing or shaking hands. No visitors.  Don't go to work, school or anywhere else.    Clean \"high touch\" surfaces often (doorknobs, counters, handles, etc.). Use a household cleaning spray or wipes. You'll find a full list of  on the EPA website: www.epa.gov/pesticide-registration/list-n-disinfectants-use-against-sars-cov-2.   Cover your mouth and nose with a mask, tissue or washcloth to avoid spreading germs.  Wash your hands and face often. Use soap and water.  Caregivers in these groups are at risk for severe illness due to COVID-19:  o People 65 years and older  o People who live in a nursing home or long-term care facility  o People with chronic disease (lung, heart, cancer, diabetes, kidney, liver, immunologic)  o People who have a weakened immune system, including those who:   Are in cancer treatment  Take medicine that weakens the immune system, such as corticosteroids  Had a bone marrow or organ transplant  Have an immune deficiency  Have poorly controlled HIV or AIDS  Are obese (body mass index of 40 or higher)  Smoke regularly   o Caregivers should wear gloves while washing dishes, handling laundry and cleaning bedrooms and bathrooms.  o Use caution when washing and drying laundry: Don't shake dirty laundry, and use the warmest water setting that you can.  o For more tips, go to www.cdc.gov/coronavirus/2019-ncov/downloads/10Things.pdf.    4.Sign up for [employer]. We know it's scary to hear that you might have COVID-19. We want to track your symptoms to make sure you're okay over the next 2 " weeks. Please look for an email from [employer]---this is a free, online program that we'll use to keep in touch. To sign up, follow the link in the email. Learn more at [email or web address]  How can I take care of myself?   Get lots of rest. Drink extra fluids (unless a doctor has told you not to).   Take Tylenol (acetaminophen) for fever or pain. If you have liver or kidney problems, ask your family doctor if it's okay to take Tylenol.   Adults can take either:    650 mg (two 325 mg pills) every 4 to 6 hours, or...   1,000 mg (two 500 mg pills) every 8 hours as needed.    Note: Don't take more than 3,000 mg in one day. Acetaminophen is found in many medicines (both prescribed and over-the-counter medicines). Read all labels to be sure you don't take too much.   For children, check the Tylenol bottle for the right dose. The dose is based on the child's age or weight.    If you have other health problems (like cancer, heart failure, an organ transplant or severe kidney disease): Call your specialty clinic if you don't feel better in the next 2 days.       Know when to call 911. Emergency warning signs include:    Trouble breathing or shortness of breath Pain or pressure in the chest that doesn't go away Feeling confused like you haven't felt before, or not being able to wake up Bluish-colored lips or face.  Where can I get more information?   [facility] -- About COVID-19: [email or web address]   CDC -- What to Do If You're Sick: www.cdc.gov/coronavirus/2019-ncov/about/steps-when-sick.html   CDC -- Ending Home Isolation: www.cdc.gov/coronavirus/2019-ncov/hcp/disposition-in-home-patients.html   CDC -- Caring for Someone: www.cdc.gov/coronavirus/2019-ncov/if-you-are-sick/care-for-someone.html   [facility] -- Interim Guidance for Hospital Discharge to Home: www.health.[facility].[place]./diseases/coronavirus/hcp/hospdischarge.pdf   [facility] clinical trials (COVID-19 research studies):  clinicalaffairs.[facility]-clinical-trials    Below are the COVID-19 hotlines at the [place] [facility] ([facility]). Interpreters are available.    For health questions: Call 948-732-7521 or 1-771.337.7698 (7 a.m. to 7 p.m.) For questions about schools and childcare: Call 808-369-3973 or 1-269.904.4524 (7 a.m. to 7 p.m.)    Diagnosis: Other malaise  Diagnosis ICD: R53.81

## 2020-08-01 LAB
SARS-COV-2 RNA SPEC QL NAA+PROBE: NOT DETECTED
SPECIMEN SOURCE: NORMAL

## 2020-08-24 ENCOUNTER — MYC MEDICAL ADVICE (OUTPATIENT)
Dept: NEUROLOGY | Facility: CLINIC | Age: 40
End: 2020-08-24

## 2020-08-25 NOTE — TELEPHONE ENCOUNTER
Called and left a VM asking patient to schedule an virtual/telephone visit to review all available headache treatment options and to see if any savings plan can be used to reduce Botox cost.

## 2020-08-27 ENCOUNTER — COMMUNICATION - HEALTHEAST (OUTPATIENT)
Dept: HEALTH INFORMATION MANAGEMENT | Facility: CLINIC | Age: 40
End: 2020-08-27

## 2020-10-05 ENCOUNTER — COMMUNICATION - HEALTHEAST (OUTPATIENT)
Dept: FAMILY MEDICINE | Facility: CLINIC | Age: 40
End: 2020-10-05

## 2020-10-05 DIAGNOSIS — F32.81 PMDD (PREMENSTRUAL DYSPHORIC DISORDER): ICD-10-CM

## 2020-10-08 ENCOUNTER — COMMUNICATION - HEALTHEAST (OUTPATIENT)
Dept: FAMILY MEDICINE | Facility: CLINIC | Age: 40
End: 2020-10-08

## 2020-10-08 DIAGNOSIS — F32.81 PMDD (PREMENSTRUAL DYSPHORIC DISORDER): ICD-10-CM

## 2020-11-01 DIAGNOSIS — G43.719 INTRACTABLE CHRONIC MIGRAINE WITHOUT AURA AND WITHOUT STATUS MIGRAINOSUS: ICD-10-CM

## 2020-11-02 ENCOUNTER — TELEPHONE (OUTPATIENT)
Dept: NEUROLOGY | Facility: CLINIC | Age: 40
End: 2020-11-02

## 2020-11-02 DIAGNOSIS — G43.719 INTRACTABLE CHRONIC MIGRAINE WITHOUT AURA AND WITHOUT STATUS MIGRAINOSUS: ICD-10-CM

## 2020-11-02 NOTE — TELEPHONE ENCOUNTER
I called pt and left a voicemail. I let her know we received an update that her pharmacy sent us a refill request for her topiramate. We would like to know if she wants one 100 mg pill vs 4 25mg tablets at bedtime. Also does she feel the medication is helping? I asked she call us back or send [employer] [facility] update.     Regla LUNA

## 2020-11-02 NOTE — TELEPHONE ENCOUNTER
[facility]    Phone Message    May a detailed message be left on voicemail: yes     Reason for Call: Other: Leny returning call. She would like to stick with the 25 MG tablets. Please call her back with any questions.      Action Taken: Message routed to:  [facility]    Travel Screening: Not Applicable

## 2020-11-02 NOTE — TELEPHONE ENCOUNTER
Rx Authorization:    Requested Medication/ Dose topiramate (TOPAMAX) 25 MG tablet    Date last refill ordered: 4/14/20    Quantity ordered: 120    # refills: 6    Date of last clinic visit with ordering provider: 7/8/20    Date of next clinic visit with ordering provider:     All pertinent protocol data (lab date/result):     Include pertinent information from patients message:

## 2020-11-03 RX ORDER — TOPIRAMATE 25 MG/1
TABLET, FILM COATED ORAL
Qty: 360 TABLET | Refills: 1 | Status: SHIPPED | OUTPATIENT
Start: 2020-11-03 | End: 2021-09-24 | Stop reason: DRUGHIGH

## 2020-11-05 RX ORDER — TOPIRAMATE 25 MG/1
TABLET, FILM COATED ORAL
Qty: 120 TABLET | Refills: 0 | OUTPATIENT
Start: 2020-11-05

## 2020-11-07 ENCOUNTER — AMBULATORY - HEALTHEAST (OUTPATIENT)
Dept: NURSING | Facility: CLINIC | Age: 40
End: 2020-11-07

## 2020-12-10 ENCOUNTER — OFFICE VISIT - HEALTHEAST (OUTPATIENT)
Dept: OBGYN | Facility: CLINIC | Age: 40
End: 2020-12-10

## 2020-12-10 ENCOUNTER — SURGERY - HEALTHEAST (OUTPATIENT)
Dept: OBGYN | Facility: CLINIC | Age: 40
End: 2020-12-10

## 2020-12-10 ENCOUNTER — AMBULATORY - HEALTHEAST (OUTPATIENT)
Dept: SURGERY | Facility: AMBULATORY SURGERY CENTER | Age: 40
End: 2020-12-10

## 2020-12-10 DIAGNOSIS — Z30.09 UNWANTED FERTILITY: ICD-10-CM

## 2020-12-10 DIAGNOSIS — Z11.59 ENCOUNTER FOR SCREENING FOR OTHER VIRAL DISEASES: ICD-10-CM

## 2021-01-03 ENCOUNTER — HEALTH MAINTENANCE LETTER (OUTPATIENT)
Age: 41
End: 2021-01-03

## 2021-01-07 ENCOUNTER — OFFICE VISIT - HEALTHEAST (OUTPATIENT)
Dept: FAMILY MEDICINE | Facility: CLINIC | Age: 41
End: 2021-01-07

## 2021-01-07 DIAGNOSIS — Z11.59 NEED FOR HEPATITIS C SCREENING TEST: ICD-10-CM

## 2021-01-07 DIAGNOSIS — Z00.00 ROUTINE GENERAL MEDICAL EXAMINATION AT A HEALTH CARE FACILITY: ICD-10-CM

## 2021-01-07 DIAGNOSIS — F32.81 PMDD (PREMENSTRUAL DYSPHORIC DISORDER): ICD-10-CM

## 2021-01-07 DIAGNOSIS — M54.9 BILATERAL BACK PAIN, UNSPECIFIED BACK LOCATION, UNSPECIFIED CHRONICITY: ICD-10-CM

## 2021-01-07 DIAGNOSIS — Z12.31 VISIT FOR SCREENING MAMMOGRAM: ICD-10-CM

## 2021-01-07 ASSESSMENT — ANXIETY QUESTIONNAIRES
1. FEELING NERVOUS, ANXIOUS, OR ON EDGE: NOT AT ALL
GAD7 TOTAL SCORE: 2
3. WORRYING TOO MUCH ABOUT DIFFERENT THINGS: NOT AT ALL
5. BEING SO RESTLESS THAT IT IS HARD TO SIT STILL: NOT AT ALL
2. NOT BEING ABLE TO STOP OR CONTROL WORRYING: NOT AT ALL
4. TROUBLE RELAXING: NOT AT ALL
IF YOU CHECKED OFF ANY PROBLEMS ON THIS QUESTIONNAIRE, HOW DIFFICULT HAVE THESE PROBLEMS MADE IT FOR YOU TO DO YOUR WORK, TAKE CARE OF THINGS AT HOME, OR GET ALONG WITH OTHER PEOPLE: NOT DIFFICULT AT ALL
6. BECOMING EASILY ANNOYED OR IRRITABLE: MORE THAN HALF THE DAYS
7. FEELING AFRAID AS IF SOMETHING AWFUL MIGHT HAPPEN: NOT AT ALL

## 2021-01-07 ASSESSMENT — MIFFLIN-ST. JEOR: SCORE: 1417.04

## 2021-01-07 ASSESSMENT — PATIENT HEALTH QUESTIONNAIRE - PHQ9: SUM OF ALL RESPONSES TO PHQ QUESTIONS 1-9: 11

## 2021-01-08 LAB — HCV AB SERPL QL IA: NEGATIVE

## 2021-01-11 ENCOUNTER — COMMUNICATION - HEALTHEAST (OUTPATIENT)
Dept: FAMILY MEDICINE | Facility: CLINIC | Age: 41
End: 2021-01-11

## 2021-01-14 ENCOUNTER — COMMUNICATION - HEALTHEAST (OUTPATIENT)
Dept: FAMILY MEDICINE | Facility: CLINIC | Age: 41
End: 2021-01-14

## 2021-01-14 DIAGNOSIS — G89.29 CHRONIC BILATERAL LOW BACK PAIN, UNSPECIFIED WHETHER SCIATICA PRESENT: ICD-10-CM

## 2021-01-14 DIAGNOSIS — M54.50 CHRONIC BILATERAL LOW BACK PAIN, UNSPECIFIED WHETHER SCIATICA PRESENT: ICD-10-CM

## 2021-01-29 ENCOUNTER — OFFICE VISIT - HEALTHEAST (OUTPATIENT)
Dept: MIDWIFE SERVICES | Facility: CLINIC | Age: 41
End: 2021-01-29

## 2021-01-29 DIAGNOSIS — Z01.812 PRE-PROCEDURE LAB EXAM: ICD-10-CM

## 2021-01-29 DIAGNOSIS — Z30.430 ENCOUNTER FOR IUD INSERTION: ICD-10-CM

## 2021-01-29 DIAGNOSIS — Z97.5 IUD (INTRAUTERINE DEVICE) IN PLACE: ICD-10-CM

## 2021-01-29 LAB — HCG UR QL: NEGATIVE

## 2021-01-29 ASSESSMENT — MIFFLIN-ST. JEOR: SCORE: 1419.31

## 2021-02-03 ENCOUNTER — HOSPITAL ENCOUNTER (OUTPATIENT)
Dept: MAMMOGRAPHY | Facility: CLINIC | Age: 41
Discharge: HOME | End: 2021-02-03
Attending: FAMILY MEDICINE

## 2021-02-03 DIAGNOSIS — Z12.31 VISIT FOR SCREENING MAMMOGRAM: ICD-10-CM

## 2021-03-22 ENCOUNTER — RECORDS - HEALTHEAST (OUTPATIENT)
Dept: ADMINISTRATIVE | Facility: OTHER | Age: 41
End: 2021-03-22

## 2021-04-15 ENCOUNTER — OFFICE VISIT - HEALTHEAST (OUTPATIENT)
Dept: FAMILY MEDICINE | Facility: CLINIC | Age: 41
End: 2021-04-15

## 2021-04-15 ENCOUNTER — COMMUNICATION - HEALTHEAST (OUTPATIENT)
Dept: FAMILY MEDICINE | Facility: CLINIC | Age: 41
End: 2021-04-15

## 2021-04-15 DIAGNOSIS — G44.89 OTHER HEADACHE SYNDROME: ICD-10-CM

## 2021-04-15 DIAGNOSIS — R53.82 CHRONIC FATIGUE: ICD-10-CM

## 2021-04-15 DIAGNOSIS — E55.9 VITAMIN D DEFICIENCY: ICD-10-CM

## 2021-04-15 DIAGNOSIS — M54.9 BILATERAL BACK PAIN, UNSPECIFIED BACK LOCATION, UNSPECIFIED CHRONICITY: ICD-10-CM

## 2021-04-15 ASSESSMENT — MIFFLIN-ST. JEOR: SCORE: 1407.97

## 2021-04-22 NOTE — PROGRESS NOTES
[facility]  Rheumatology Clinic  Rene Trevino MD  2021     Name: Leny Jeffrey  MRN: [id number]  Age: 40 year old  : 1980  Referring provider: Carmela Her     Assessment and Plan:  # Diffuse joint pain, fatigue, headaches, facial rashes, positive MICHELLE:   Patient relates persistent diffuse joint aches, daytime fatigue, dysphoria. Symptom control is improved overall with increased exercise and weight loss. Video exam shows no swelling in visible hand joints, and is otherwise unremarkable. Blood work in 2020 showed creatinine, transaminases, and CBC all normal.  Urine showed small blood on the dipstick but no cells.    The lack of improvement in joint pain following a trial course of low dose prednisone in Spring 2019 argued against a significant inflammatory component of the patient's ongoing pain and fatigue. Given diffuse musculoskeletal symptoms and positive MICHELLE, the patient remains at slightly increased risk of developing autoimmunity. I recommend screening visit with rheumatology every 6-12 months to review symptoms and check CBC, creatinine, and urinalysis. Will also check complements, anti-DNA.    2.  Chronic low back pain, persistent: Pain persists every day, despite modest overall improvement with increased physical exercise and dedicated flexion extension exercises.  Lumbar spine MRI in 2020 did not reveal pain generators, and trials of epidural steroids and facet joint injections did not unfortunately result in improvement.  I now recommend imaging the sacroiliac joints for signs of erosions, bone marrow edema that might suggest an inflammatory component.    3.  Chronic headaches, migrainous: stable  4.  Medial epicondylitis: bilateral, not likely related to systemic rheumatic disease. Not discussed today.  5. Anterior bony pelvis pain: Pain is localized at anterior iliac crests; likely associated with tendinitis/bursitis at entheses.  Hip range of motion is unimpaired.   "Pain is not likely generated by ball-and-socket hip joint.  6.  Transient neck rash: absent now; Rash was most likely related to exposure 9sun, insect, clothing) acquired during travel to [place]; I doubt contribution from systemic autoimmune disease.    Plan:  1.  Recheck blood counts, creatinine, liver function, inflammatory markers, and urine for signs of visceral involvement by inflammatory or autoimmune disease.  2.  Continue regular physical exercise, get good quality sleep, utilize ibuprofen 400 to 600 mg up to 3 times daily as needed for joint discomfort.  3.  Continue follow-up with neurology for headache management.  4.  Monitor for new or worsening symptoms in the skin, chest, abdomen.  5.  MRI of the pelvis to evaluate sacroiliac joints.    Follow-up: 6 mos    Rene Trevino MD  Staff Rheumatologist, [facility]:   Leny Jeffrey hasa history of MICHELLE+, muscle pain, back pain, fatigue, and frequent headaches who presents for follow up. Last seen in 6-2020, when watchful waiting was continued. Symptomatic treatment was recommended.    Interval history 04-:    Health is better. She has been doing home exercise program increasingly, and that has helped.    She saw a provider at [facility] last year; she had both facet and epidural injections that \"did nothing\". In fact, her back pain got worse for awhile. She noted gradual improvement over several months. She tried gabapentin; it did nothing. She saw Dr. Her last week; new labs were ordered and consideration of diet/holistic things.    Her \"hips\" have been troublesome. She has pain in the frontal pelvic bone prominence; no tenderness or alteration with hip motion. Pain is also noticed in the L high buttock.    She tried duloxetine after the last visit; she had \"sexual side effects\", so she stopped.    Ibuprofen several times weekly helps headache, but does not help her back pain. She has had no relief with muscle " "relaxers.      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  She still has headaches, no change in intensity.  Low back pain continues; MRI showed \"stenosis\" in lumbar spine.   Fingers are still painful; not tender, but diffusely fatigued. ADLs are not particularly affected; she doesn't have full strength to lift and pull. In the mornings, hands and feet are stiff and \"tired\" in the mornings, lasting 15-30 minutes. But tiredness does not resolve through the day.  She does take ibuprofen for headaches, but does not feel that it helps her joint pain.  Acetaminophen does not help joint symptoms.  Back pain is dominant over others; she went to [facility] until COVID19.    She stopped drinking coffee, and thinks that raised patches on her neck have decreased since.    Prior history:    HX 8-2019:  Today the patient reports that she did not notice significant change in diffuse joint or muscle pain on the two week course of prednisone, although she did feel more achy a couple of days after stopping it. She continues to have fatigue throughout the day and headaches and notes that she developed a headache yesterday, for which she used naproxen. She continues to have finger and toe pain and stiffness without visible swelling. Naproxen did not help with finger pain. She is using her hands on a daily basis. She reports good sleep quality overall. She also reports areas of dryness to the face, neck, and arms but denies skin rashes otherwise. Cell phone photogram from 07/27/2019 shows some poorly marginated erythema over the right malar area. No ulcerations or comedonal regions. The area is not itchy. She saw a dermatologist last year who recommended salicylic acid lotion, moisturizing cream, and hydrocortisone 1%. No cough or shortness of breath.    She reports that over the past 3 years, her mood and attention span has been low. She reports that she feels drained " and feels overall apathy for life. She has not had the energy to exercise, which has previously helped with her mood. She tried sertraline in the past but this was no effective.     Review of Systems:   Pertinent items are noted in HPI or as below, remainder of complete ROS is negative.      No recent problems with hearing or vision. No swallowing problems.   No breathing difficulty, shortness of breath, coughing, or wheezing  No chest pain or palpitations  No heart burn, indigestion, abdominal pain, nausea, vomiting, diarrhea  No urination problems, no bloody, cloudy urine, no dysuria  No numbing, tingling, weakness  No confusion  No easy bleeding or bruising; + spotting between her periods    Active Medications:     Current Outpatient Medications:      ascorbic acid 500 MG TABS, Take 250 mg by mouth every other day, Disp: , Rfl:      Botulinum Toxin Type A (BOTOX) 200 units injection, Inject 155 Units into the muscle every 3 months, Disp: 155 Units, Rfl: 4     buPROPion (WELLBUTRIN XL) 150 MG 24 hr tablet, Take 300 mg by mouth every morning , Disp: , Rfl:      Calcium Carb-Cholecalciferol (CALCIUM 600/VITAMIN D3) 600-800 MG-UNIT TABS, Take 1 tablet by mouth daily , Disp: , Rfl:      eletriptan (RELPAX) 20 MG tablet, Take 1-2 tablets (20-40 mg) by mouth at onset of headache for migraine (may repeat 20-40 mg  in 2 hours as needed. Max 80 mg in 24 hours), Disp: 18 tablet, Rfl: 3     Ferrous Sulfate (IRON) 325 (65 Fe) MG tablet, Take 1 tablet by mouth every other day , Disp: , Rfl:      magnesium 250 MG tablet, Take 1 tablet by mouth every other day , Disp: , Rfl:      ondansetron (ZOFRAN-ODT) 4 MG ODT tab, Take 1 tablet (4 mg) by mouth every 6 hours as needed for nausea (Patient not taking: Reported on 6/9/2020), Disp: 20 tablet, Rfl: 6     prochlorperazine (COMPAZINE) 5 MG tablet, Take 0.5-1 tablets (2.5-5 mg) by mouth every 6 hours as needed for nausea or vomiting (Patient not taking: Reported on 6/9/2020), Disp:  20 tablet, Rfl: 3     rizatriptan (MAXALT-MLT) 5 MG ODT, Take 1-2 tablets (5-10 mg) by mouth at onset of headache for migraine (may repeat in 2 hours as needed. Max 30 mg in 24 hours), Disp: 18 tablet, Rfl: 6     SUMAtriptan (IMITREX STATDOSE) 6 MG/0.5ML pen injector kit, Inject 0.5 mLs (6 mg) Subcutaneous at onset of headache for migraine (may repeat in one hour as needed. Max 12 mg in 24 hours), Disp: 3 kit, Rfl: 9     topiramate (TOPAMAX) 25 MG tablet, TAKE 4 TABS (100MG) at bedtime, Disp: 360 tablet, Rfl: 1     vitamin C (ASCORBIC ACID) 500 MG tablet, Take 250 mg by mouth every other day , Disp: , Rfl:       Allergies:   Patient has no known allergies.      Past Medical History:  Headache  Premenstrual dysphoric disorder     Past Surgical History:  [place] repair of nasal septum  Cervical biopsy w/ loop electrode excision  [place] tooth extraction     Family History:   Father: Mental illness, alcohol abuse  Maternal grandfather: Cancer  Maternal grandmother: Colon cancer  Paternal grandfather: Heart attack  Paternal grandmother: Heart disease.  No family history of autoimmune disease.   Prophyria in Aunt and father.      Social History:   Never smoked.  Occasional alcohol use.  Has 2 children.     Physical Exam:   There were no vitals taken for this visit.   Wt Readings from Last 4 Encounters:   11/22/19 87.1 kg (192 lb 1.6 oz)   08/02/19 84.8 kg (187 lb)   06/28/19 84.6 kg (186 lb 9.6 oz)     Constitutional: Well-developed, appearing stated age; cooperative  Eyes: Normal EOM, PERRLA, vision, conjunctiva, sclera  ENT: Normal external ears, nose, hearing, lips, teeth, gums, throat. No mucous membrane lesions, normal saliva pool  Neck: No mass or thyroid enlargement  MS: Normal alignment of PIPs and MCPs; no visible swelling at the joints of the hands or fingers; fist formation excellent.  Patient indicates points of maximum pain at anterior iliac crests/spines.  Skin: No nail pitting, alopecia, rash, nodules or  lesions  Neuro: Normal cranial nerves, strength  Psych: Normal judgement, orientation, memory, affect.     Laboratory:   RHEUM RESULTS Latest Ref Rng & Units 6/28/2019   CRP, INFLAMMATION 0.0 - 8.0 mg/L <2.9   CK TOTAL 30 - 225 U/L 89   RHEUMATOID FACTOR <20 IU/mL <20   AST 0 - 45 U/L 16   ALT 0 - 50 U/L 26   WBC 4.0 - 11.0 10e9/L 6.8   RBC 3.8 - 5.2 10e12/L 4.97   HGB 11.7 - 15.7 g/dL 15.2   HCT 35.0 - 47.0 % 45.3   MCV 78 - 100 fl 91   MCHC 31.5 - 36.5 g/dL 33.6   RDW 10.0 - 15.0 % 12.4    - 450 10e9/L 232   CREATININE 0.52 - 1.04 mg/dL 0.80   GFR ESTIMATE, IF BLACK >60 mL/min/[1.73:m2] >90   GFR ESTIMATE >60 mL/min/[1.73:m2] >90       Rheumatoid Factor   Date Value Ref Range Status   06/28/2019 <20 <20 IU/mL Final     Cyclic Citrullinated Peptide Antibody, IgG   Date Value Ref Range Status   06/28/2019 1 <7 U/mL Final     Comment:     Negative     MICHELLE interpretation   Date Value Ref Range Status   06/28/2019 Positive (A) NEG^Negative Final     Comment:                                        Reference range:  <1:40  NEGATIVE  1:40 - 1:80  BORDERLINE POSITIVE  >1:80 POSITIVE       MICHELLE pattern 1   Date Value Ref Range Status   06/28/2019 NUCLEAR DOTS  Final     MICHELLE titer 1   Date Value Ref Range Status   06/28/2019 1:160  Final     Leny is a 40 year old who is being evaluated via a billable video visit.      How would you like to obtain your AVS? MyChart  If the video visit is dropped, the invitation should be resent by: Send to e-mail at: [email or web address]  Will anyone else be joining your video visit? No      Video Start Time: 10:35 AM  Video-Visit Details    Type of service:  Video Visit    Video End Time:11:19 AM    Originating Location (pt. Location): Home    Distant Location (provider location):  [facility] RHEUMATOLOGY [facility]     Platform used for Video Visit: [place]ighborhoods

## 2021-04-23 ENCOUNTER — RECORDS - HEALTHEAST (OUTPATIENT)
Dept: ADMINISTRATIVE | Facility: OTHER | Age: 41
End: 2021-04-23

## 2021-04-23 ENCOUNTER — VIRTUAL VISIT (OUTPATIENT)
Dept: RHEUMATOLOGY | Facility: CLINIC | Age: 41
End: 2021-04-23
Attending: INTERNAL MEDICINE
Payer: COMMERCIAL

## 2021-04-23 ENCOUNTER — MYC MEDICAL ADVICE (OUTPATIENT)
Dept: RHEUMATOLOGY | Facility: CLINIC | Age: 41
End: 2021-04-23

## 2021-04-23 DIAGNOSIS — R76.8 POSITIVE ANA (ANTINUCLEAR ANTIBODY): ICD-10-CM

## 2021-04-23 DIAGNOSIS — G89.29 CHRONIC BILATERAL LOW BACK PAIN WITHOUT SCIATICA: Primary | ICD-10-CM

## 2021-04-23 DIAGNOSIS — M54.50 CHRONIC BILATERAL LOW BACK PAIN WITHOUT SCIATICA: Primary | ICD-10-CM

## 2021-04-23 PROCEDURE — 99214 OFFICE O/P EST MOD 30 MIN: CPT | Mod: 95 | Performed by: INTERNAL MEDICINE

## 2021-04-23 ASSESSMENT — PAIN SCALES - GENERAL: PAINLEVEL: SEVERE PAIN (6)

## 2021-04-23 NOTE — PATIENT INSTRUCTIONS
"Diagnosis:  1.  Diffuse arthralgia, fatigue, positive MICHELLE: Recently joint and constitutional symptoms have been better controlled with increased physical activity and dedicated exercises for the low back.  Recent neck rash does not sound like a \"lupus rash\", but I will review the photograph that has been sent.  I recommend continued monitoring, but do not suggest immunomodulatory treatment at present.  2.  Chronic low back pain: Modestly improved control with regular exercise, but no improvement with facet joint or epidural steroid injections.  Some features suggest the possibility of inflammation affecting the joints between the sacrum and the ileum (sacroiliitis).  I recommend MRI to focus specifically on those joints.  3.  Chronic headaches, migrainous, stable.  4.  Anterior hip area discomfort, worse with activity after a period of immobility: I think it unlikely that the ball-and-socket aspects of the hip are pain generators in this case.  \"Enthesitis\" involving tendon or ligament insertions at the bony prominences of the anterior pelvis are more likely.  I recommend continued regular exercise as the major antidote.    Plan:  1.  Recheck blood counts, creatinine, liver function, inflammatory markers, and urine for signs of visceral involvement by inflammatory or autoimmune disease.  2.  Continue regular physical exercise, get good quality sleep, utilize ibuprofen 400 to 600 mg up to 3 times daily as needed for joint discomfort.  3.  Continue follow-up with neurology for headache management.  4.  Monitor for new or worsening symptoms in the skin, chest, abdomen.  5.  MRI of the pelvis to evaluate sacroiliac joints.    "

## 2021-04-23 NOTE — LETTER
2021       [facility]: [facility]  [address] [facility] [facility] [address]     Dear Colleague,    Thank you for referring your patient, Leny Jeffrey, to the [facility] RHEUMATOLOGY CLINIC [place] at [facility]. Please see a copy of my visit note below.    [facility]  Rheumatology Clinic  Rene Trevino MD  2021     Name: Leny Jeffrey  MRN: [id number]  Age: 40 year old  : 1980  Referring provider: Carmela Her     Assessment and Plan:  # Diffuse joint pain, fatigue, headaches, facial rashes, positive MICHELLE:   Patient relates persistent diffuse joint aches, daytime fatigue, dysphoria. Symptom control is improved overall with increased exercise and weight loss. Video exam shows no swelling in visible hand joints, and is otherwise unremarkable. Blood work in 2020 showed creatinine, transaminases, and CBC all normal.  Urine showed small blood on the dipstick but no cells.    The lack of improvement in joint pain following a trial course of low dose prednisone in Spring 2019 argued against a significant inflammatory component of the patient's ongoing pain and fatigue. Given diffuse musculoskeletal symptoms and positive MICHELLE, the patient remains at slightly increased risk of developing autoimmunity. I recommend screening visit with rheumatology every 6-12 months to review symptoms and check CBC, creatinine, and urinalysis. Will also check complements, anti-DNA.    2.  Chronic low back pain, persistent: Pain persists every day, despite modest overall improvement with increased physical exercise and dedicated flexion extension exercises.  Lumbar spine MRI in 2020 did not reveal pain generators, and trials of epidural steroids and facet joint injections did not unfortunately result in improvement.  I now recommend imaging the sacroiliac joints for signs of erosions, bone marrow edema that might suggest an inflammatory component.    3.   "Chronic headaches, migrainous: stable  4.  Medial epicondylitis: bilateral, not likely related to systemic rheumatic disease. Not discussed today.  5. Anterior bony pelvis pain: Pain is localized at anterior iliac crests; likely associated with tendinitis/bursitis at entheses.  Hip range of motion is unimpaired.  Pain is not likely generated by ball-and-socket hip joint.  6.  Transient neck rash: absent now; Rash was most likely related to exposure 9sun, insect, clothing) acquired during travel to [place]; I doubt contribution from systemic autoimmune disease.    Plan:  1.  Recheck blood counts, creatinine, liver function, inflammatory markers, and urine for signs of visceral involvement by inflammatory or autoimmune disease.  2.  Continue regular physical exercise, get good quality sleep, utilize ibuprofen 400 to 600 mg up to 3 times daily as needed for joint discomfort.  3.  Continue follow-up with neurology for headache management.  4.  Monitor for new or worsening symptoms in the skin, chest, abdomen.  5.  MRI of the pelvis to evaluate sacroiliac joints.    Follow-up: 6 mos    Rene Trevino MD  Staff Rheumatologist, [facility]:   Leny Jeffrey hasa history of MICHELLE+, muscle pain, back pain, fatigue, and frequent headaches who presents for follow up. Last seen in 6-2020, when watchful waiting was continued. Symptomatic treatment was recommended.    Interval history 04-:    Health is better. She has been doing home exercise program increasingly, and that has helped.    She saw a provider at [facility] last year; she had both facet and epidural injections that \"did nothing\". In fact, her back pain got worse for awhile. She noted gradual improvement over several months. She tried gabapentin; it did nothing. She saw Dr. Her last week; new labs were ordered and consideration of diet/holistic things.    Her \"hips\" have been troublesome. She has pain in the frontal pelvic bone prominence; no " "tenderness or alteration with hip motion. Pain is also noticed in the L high buttock.    She tried duloxetine after the last visit; she had \"sexual side effects\", so she stopped.    Ibuprofen several times weekly helps headache, but does not help her back pain. She has had no relief with muscle relaxers.      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  She still has headaches, no change in intensity.  Low back pain continues; MRI showed \"stenosis\" in lumbar spine.   Fingers are still painful; not tender, but diffusely fatigued. ADLs are not particularly affected; she doesn't have full strength to lift and pull. In the mornings, hands and feet are stiff and \"tired\" in the mornings, lasting 15-30 minutes. But tiredness does not resolve through the day.  She does take ibuprofen for headaches, but does not feel that it helps her joint pain.  Acetaminophen does not help joint symptoms.  Back pain is dominant over others; she went to [facility] until COVID19.    She stopped drinking coffee, and thinks that raised patches on her neck have decreased since.    Prior history:    HX 8-2019:  Today the patient reports that she did not notice significant change in diffuse joint or muscle pain on the two week course of prednisone, although she did feel more achy a couple of days after stopping it. She continues to have fatigue throughout the day and headaches and notes that she developed a headache yesterday, for which she used naproxen. She continues to have finger and toe pain and stiffness without visible swelling. Naproxen did not help with finger pain. She is using her hands on a daily basis. She reports good sleep quality overall. She also reports areas of dryness to the face, neck, and arms but denies skin rashes otherwise. Cell phone photogram from 07/27/2019 shows some poorly marginated erythema over the right malar area. No ulcerations or comedonal " regions. The area is not itchy. She saw a dermatologist last year who recommended salicylic acid lotion, moisturizing cream, and hydrocortisone 1%. No cough or shortness of breath.    She reports that over the past 3 years, her mood and attention span has been low. She reports that she feels drained and feels overall apathy for life. She has not had the energy to exercise, which has previously helped with her mood. She tried sertraline in the past but this was no effective.     Review of Systems:   Pertinent items are noted in HPI or as below, remainder of complete ROS is negative.      No recent problems with hearing or vision. No swallowing problems.   No breathing difficulty, shortness of breath, coughing, or wheezing  No chest pain or palpitations  No heart burn, indigestion, abdominal pain, nausea, vomiting, diarrhea  No urination problems, no bloody, cloudy urine, no dysuria  No numbing, tingling, weakness  No confusion  No easy bleeding or bruising; + spotting between her periods    Active Medications:     Current Outpatient Medications:      ascorbic acid 500 MG TABS, Take 250 mg by mouth every other day, Disp: , Rfl:      Botulinum Toxin Type A (BOTOX) 200 units injection, Inject 155 Units into the muscle every 3 months, Disp: 155 Units, Rfl: 4     buPROPion (WELLBUTRIN XL) 150 MG 24 hr tablet, Take 300 mg by mouth every morning , Disp: , Rfl:      Calcium Carb-Cholecalciferol (CALCIUM 600/VITAMIN D3) 600-800 MG-UNIT TABS, Take 1 tablet by mouth daily , Disp: , Rfl:      eletriptan (RELPAX) 20 MG tablet, Take 1-2 tablets (20-40 mg) by mouth at onset of headache for migraine (may repeat 20-40 mg  in 2 hours as needed. Max 80 mg in 24 hours), Disp: 18 tablet, Rfl: 3     Ferrous Sulfate (IRON) 325 (65 Fe) MG tablet, Take 1 tablet by mouth every other day , Disp: , Rfl:      magnesium 250 MG tablet, Take 1 tablet by mouth every other day , Disp: , Rfl:      ondansetron (ZOFRAN-ODT) 4 MG ODT tab, Take 1 tablet  (4 mg) by mouth every 6 hours as needed for nausea (Patient not taking: Reported on 6/9/2020), Disp: 20 tablet, Rfl: 6     prochlorperazine (COMPAZINE) 5 MG tablet, Take 0.5-1 tablets (2.5-5 mg) by mouth every 6 hours as needed for nausea or vomiting (Patient not taking: Reported on 6/9/2020), Disp: 20 tablet, Rfl: 3     rizatriptan (MAXALT-MLT) 5 MG ODT, Take 1-2 tablets (5-10 mg) by mouth at onset of headache for migraine (may repeat in 2 hours as needed. Max 30 mg in 24 hours), Disp: 18 tablet, Rfl: 6     SUMAtriptan (IMITREX STATDOSE) 6 MG/0.5ML pen injector kit, Inject 0.5 mLs (6 mg) Subcutaneous at onset of headache for migraine (may repeat in one hour as needed. Max 12 mg in 24 hours), Disp: 3 kit, Rfl: 9     topiramate (TOPAMAX) 25 MG tablet, TAKE 4 TABS (100MG) at bedtime, Disp: 360 tablet, Rfl: 1     vitamin C (ASCORBIC ACID) 500 MG tablet, Take 250 mg by mouth every other day , Disp: , Rfl:       Allergies:   Patient has no known allergies.      Past Medical History:  Headache  Premenstrual dysphoric disorder     Past Surgical History:  ND repair of nasal septum  Cervical biopsy w/ loop electrode excision  [place] tooth extraction     Family History:   Father: Mental illness, alcohol abuse  Maternal grandfather: Cancer  Maternal grandmother: Colon cancer  Paternal grandfather: Heart attack  Paternal grandmother: Heart disease.  No family history of autoimmune disease.   Prophyria in Aunt and father.      Social History:   Never smoked.  Occasional alcohol use.  Has 2 children.     Physical Exam:   There were no vitals taken for this visit.   Wt Readings from Last 4 Encounters:   11/22/19 87.1 kg (192 lb 1.6 oz)   08/02/19 84.8 kg (187 lb)   06/28/19 84.6 kg (186 lb 9.6 oz)     Constitutional: Well-developed, appearing stated age; cooperative  Eyes: Normal EOM, PERRLA, vision, conjunctiva, sclera  ENT: Normal external ears, nose, hearing, lips, teeth, gums, throat. No mucous membrane lesions, normal saliva  pool  Neck: No mass or thyroid enlargement  MS: Normal alignment of PIPs and MCPs; no visible swelling at the joints of the hands or fingers; fist formation excellent.  Patient indicates points of maximum pain at anterior iliac crests/spines.  Skin: No nail pitting, alopecia, rash, nodules or lesions  Neuro: Normal cranial nerves, strength  Psych: Normal judgement, orientation, memory, affect.     Laboratory:   RHEUM RESULTS Latest Ref Rng & Units 6/28/2019   CRP, INFLAMMATION 0.0 - 8.0 mg/L <2.9   CK TOTAL 30 - 225 U/L 89   RHEUMATOID FACTOR <20 IU/mL <20   AST 0 - 45 U/L 16   ALT 0 - 50 U/L 26   WBC 4.0 - 11.0 10e9/L 6.8   RBC 3.8 - 5.2 10e12/L 4.97   HGB 11.7 - 15.7 g/dL 15.2   HCT 35.0 - 47.0 % 45.3   MCV 78 - 100 fl 91   MCHC 31.5 - 36.5 g/dL 33.6   RDW 10.0 - 15.0 % 12.4    - 450 10e9/L 232   CREATININE 0.52 - 1.04 mg/dL 0.80   GFR ESTIMATE, IF BLACK >60 mL/min/[1.73:m2] >90   GFR ESTIMATE >60 mL/min/[1.73:m2] >90       Rheumatoid Factor   Date Value Ref Range Status   06/28/2019 <20 <20 IU/mL Final     Cyclic Citrullinated Peptide Antibody, IgG   Date Value Ref Range Status   06/28/2019 1 <7 U/mL Final     Comment:     Negative     MICHELLE interpretation   Date Value Ref Range Status   06/28/2019 Positive (A) NEG^Negative Final     Comment:                                        Reference range:  <1:40  NEGATIVE  1:40 - 1:80  BORDERLINE POSITIVE  >1:80 POSITIVE       MICHELLE pattern 1   Date Value Ref Range Status   06/28/2019 NUCLEAR DOTS  Final     MICHELLE titer 1   Date Value Ref Range Status   06/28/2019 1:160  Final     Leny is a 40 year old who is being evaluated via a billable video visit.      How would you like to obtain your AVS? MyChart  If the video visit is dropped, the invitation should be resent by: Send to e-mail at: [email or web address]  Will anyone else be joining your video visit? No      Video Start Time: 10:35 AM  Video-Visit Details    Type of service:  Video Visit    Video End Time:11:19  AM    Originating Location (pt. Location): Home    Distant Location (provider location):  [facility] RHEUMATOLOGY CLINIC [place]     Platform used for Video Visit: Fiona      Again, thank you for allowing me to participate in the care of your patient.      Sincerely,    Rene Trevino MD

## 2021-04-25 ENCOUNTER — HEALTH MAINTENANCE LETTER (OUTPATIENT)
Age: 41
End: 2021-04-25

## 2021-04-27 NOTE — TELEPHONE ENCOUNTER
Thank you for the picture. The scaly rash in a sun-exposed area could be due to an environmental cause, or to underlying autoimmunity. If it recurs, I will  evaluation in Dermatology.

## 2021-05-05 ENCOUNTER — AMBULATORY - HEALTHEAST (OUTPATIENT)
Dept: LAB | Facility: CLINIC | Age: 41
End: 2021-05-05

## 2021-05-05 ENCOUNTER — RECORDS - HEALTHEAST (OUTPATIENT)
Dept: ADMINISTRATIVE | Facility: OTHER | Age: 41
End: 2021-05-05

## 2021-05-05 DIAGNOSIS — R76.8 POSITIVE ANA (ANTINUCLEAR ANTIBODY): ICD-10-CM

## 2021-05-05 DIAGNOSIS — M25.50 ARTHRALGIA: ICD-10-CM

## 2021-05-06 ENCOUNTER — SURGERY - HEALTHEAST (OUTPATIENT)
Dept: OBGYN | Facility: CLINIC | Age: 41
End: 2021-05-06

## 2021-05-06 DIAGNOSIS — Z30.09 UNWANTED FERTILITY: ICD-10-CM

## 2021-05-07 ENCOUNTER — AMBULATORY - HEALTHEAST (OUTPATIENT)
Dept: LAB | Facility: CLINIC | Age: 41
End: 2021-05-07

## 2021-05-07 ENCOUNTER — TELEPHONE (OUTPATIENT)
Dept: RHEUMATOLOGY | Facility: CLINIC | Age: 41
End: 2021-05-07

## 2021-05-07 ENCOUNTER — RECORDS - HEALTHEAST (OUTPATIENT)
Dept: ADMINISTRATIVE | Facility: OTHER | Age: 41
End: 2021-05-07

## 2021-05-07 DIAGNOSIS — R76.8 POSITIVE ANA (ANTINUCLEAR ANTIBODY): ICD-10-CM

## 2021-05-07 DIAGNOSIS — E55.9 VITAMIN D DEFICIENCY: ICD-10-CM

## 2021-05-07 DIAGNOSIS — G44.89 OTHER HEADACHE SYNDROME: ICD-10-CM

## 2021-05-07 DIAGNOSIS — M25.50 ARTHRALGIA: ICD-10-CM

## 2021-05-07 DIAGNOSIS — M54.9 BILATERAL BACK PAIN, UNSPECIFIED BACK LOCATION, UNSPECIFIED CHRONICITY: ICD-10-CM

## 2021-05-07 DIAGNOSIS — R53.82 CHRONIC FATIGUE: ICD-10-CM

## 2021-05-07 LAB
ALBUMIN UR-MCNC: NEGATIVE G/DL
ALT SERPL W P-5'-P-CCNC: 17 U/L (ref 0–45)
APPEARANCE UR: CLEAR
AST SERPL W P-5'-P-CCNC: 26 U/L (ref 0–40)
BACTERIA #/AREA URNS HPF: ABNORMAL /[HPF]
BASOPHILS # BLD AUTO: 0.1 THOU/UL (ref 0–0.2)
BASOPHILS NFR BLD AUTO: 1 % (ref 0–2)
BILIRUB UR QL STRIP: NEGATIVE
C3 SERPL-MCNC: 104 MG/DL (ref 83–177)
C4 SERPL-MCNC: 27 MG/DL (ref 19–59)
COLOR UR AUTO: YELLOW
CREAT SERPL-MCNC: 1.06 MG/DL (ref 0.6–1.1)
CRP SERPL HS-MCNC: 8.1 MG/L (ref 0–3)
EOSINOPHIL # BLD AUTO: 0.1 THOU/UL (ref 0–0.4)
EOSINOPHIL NFR BLD AUTO: 2 % (ref 0–6)
ERYTHROCYTE [DISTWIDTH] IN BLOOD BY AUTOMATED COUNT: 12.4 % (ref 11–14.5)
ERYTHROCYTE [SEDIMENTATION RATE] IN BLOOD BY WESTERGREN METHOD: 9 MM/HR (ref 0–20)
GFR SERPL CREATININE-BSD FRML MDRD: 57 ML/MIN/1.73M2
GLUCOSE UR STRIP-MCNC: NEGATIVE MG/DL
HCT VFR BLD AUTO: 42.3 % (ref 35–47)
HGB BLD-MCNC: 14.9 G/DL (ref 12–16)
HGB UR QL STRIP: ABNORMAL
IMM GRANULOCYTES # BLD: 0 THOU/UL
IMM GRANULOCYTES NFR BLD: 0 %
KETONES UR STRIP-MCNC: NEGATIVE MG/DL
LEUKOCYTE ESTERASE UR QL STRIP: NEGATIVE
LYMPHOCYTES # BLD AUTO: 1.3 THOU/UL (ref 0.8–4.4)
LYMPHOCYTES NFR BLD AUTO: 28 % (ref 20–40)
MCH RBC QN AUTO: 30.8 PG (ref 27–34)
MCHC RBC AUTO-ENTMCNC: 35.2 G/DL (ref 32–36)
MCV RBC AUTO: 87 FL (ref 80–100)
MONOCYTES # BLD AUTO: 0.4 THOU/UL (ref 0–0.9)
MONOCYTES NFR BLD AUTO: 8 % (ref 2–10)
NEUTROPHILS # BLD AUTO: 2.7 THOU/UL (ref 2–7.7)
NEUTROPHILS NFR BLD AUTO: 60 % (ref 50–70)
NITRATE UR QL: NEGATIVE
PH UR STRIP: 7 [PH] (ref 5–8)
PLATELET # BLD AUTO: 305 THOU/UL (ref 140–440)
PMV BLD AUTO: 10.2 FL (ref 7–10)
RBC # BLD AUTO: 4.84 MILL/UL (ref 3.8–5.4)
RBC #/AREA URNS AUTO: ABNORMAL HPF
SP GR UR STRIP: 1.01 (ref 1–1.03)
SQUAMOUS #/AREA URNS AUTO: ABNORMAL LPF
T3FREE SERPL-MCNC: 2.2 PG/ML (ref 1.9–3.9)
T4 FREE SERPL-MCNC: 1.1 NG/DL (ref 0.7–1.8)
TSH SERPL DL<=0.005 MIU/L-ACNC: 1.46 UIU/ML (ref 0.3–5)
UROBILINOGEN UR STRIP-ACNC: ABNORMAL
VIT B12 SERPL-MCNC: 516 PG/ML (ref 213–816)
WBC #/AREA URNS AUTO: ABNORMAL HPF
WBC: 4.5 THOU/UL (ref 4–11)

## 2021-05-09 ENCOUNTER — AMBULATORY - HEALTHEAST (OUTPATIENT)
Dept: SURGERY | Facility: AMBULATORY SURGERY CENTER | Age: 41
End: 2021-05-09

## 2021-05-09 DIAGNOSIS — Z11.59 ENCOUNTER FOR SCREENING FOR OTHER VIRAL DISEASES: ICD-10-CM

## 2021-05-09 LAB — THYROGLOB AB SERPL-ACNC: <0.9 IU/ML (ref 0–4)

## 2021-05-10 LAB
25(OH)D3 SERPL-MCNC: 45 NG/ML (ref 30–80)
ZINC SERPL-MCNC: 72.8 UG/DL (ref 60–120)

## 2021-05-11 LAB
GLIADIN IGA SER-ACNC: 1.4 U/ML
GLIADIN IGG SER-ACNC: <0.4 U/ML
IGA SERPL-MCNC: 291 MG/DL (ref 65–400)
PYRIDOXAL PHOS SERPL-SCNC: 33.1 NMOL/L (ref 20–125)
THYROID PEROXIDASE ANTIBODIES - HISTORICAL: <3 IU/ML (ref 0–5.6)
TTG IGA SER-ACNC: 0.3 U/ML
TTG IGG SER-ACNC: <0.6 U/ML

## 2021-05-12 LAB
MAGNESIUM,RBC - HISTORICAL: 3.6 MG/DL (ref 3.5–7.1)
SPECIMEN STATUS: NORMAL

## 2021-05-13 ENCOUNTER — RECORDS - HEALTHEAST (OUTPATIENT)
Dept: ADMINISTRATIVE | Facility: OTHER | Age: 41
End: 2021-05-13

## 2021-05-13 ENCOUNTER — RECORDS - HEALTHEAST (OUTPATIENT)
Dept: SCHEDULING | Facility: CLINIC | Age: 41
End: 2021-05-13

## 2021-05-13 DIAGNOSIS — G89.29 CHRONIC PAIN: ICD-10-CM

## 2021-05-13 DIAGNOSIS — M54.50 LOW BACK PAIN: ICD-10-CM

## 2021-05-13 LAB
T3REVERSE SERPL-MCNC: 16.7 NG/DL (ref 9–27)
VIT B2 SERPL-MCNC: 6 MCG/L (ref 1–19)

## 2021-05-21 ENCOUNTER — COMMUNICATION - HEALTHEAST (OUTPATIENT)
Dept: FAMILY MEDICINE | Facility: CLINIC | Age: 41
End: 2021-05-21

## 2021-05-26 ASSESSMENT — PATIENT HEALTH QUESTIONNAIRE - PHQ9
SUM OF ALL RESPONSES TO PHQ QUESTIONS 1-9: 16
SUM OF ALL RESPONSES TO PHQ QUESTIONS 1-9: 12
SUM OF ALL RESPONSES TO PHQ QUESTIONS 1-9: 3

## 2021-05-27 ASSESSMENT — PATIENT HEALTH QUESTIONNAIRE - PHQ9: SUM OF ALL RESPONSES TO PHQ QUESTIONS 1-9: 11

## 2021-05-28 ASSESSMENT — ANXIETY QUESTIONNAIRES
GAD7 TOTAL SCORE: 2

## 2021-05-28 NOTE — TELEPHONE ENCOUNTER
Called insurance  - I cannot order MRI of chest to evaluated sub xypohid mass   - I have not seen her recently or documented 'therapy' (which in this case will be NA)   - original ultrasound was of abdomen, so it doesn't make sense to order MRI of chest   - Must order CT abdomen, not MRI([id number])   - called pt and left message to reschedule appt

## 2021-05-28 NOTE — TELEPHONE ENCOUNTER
Location for patient to be having her CT for her Prior auth was [facility] but patient is scheduled at [facility]. Tried calling Leny to get her connected to [employer] to change the location to [facility] but they are saying that the patient needs to call herself to change the location.    I left a message for Leny to call [phone] and ask for Member Scheduling to change from [facility] to [facility] # [id number] - [address], [place] [address].    Thanks,  Taylor

## 2021-05-28 NOTE — PROGRESS NOTES
Assessment and Plan    1. Epigastric mass  Persistent, painful with pressure.  Nothing seen on previous ultrasound  - CT Abdomen With Oral With IV Contrast; Future    2. PMDD (premenstrual dysphoric disorder)  Leny was taking sertraline for a while -  She was only at 75 mg and felt like it wasn't doing anything. So she stopped, H.Q. elevated today, but for the moment she does not want to restart medication    3. Vitamin D deficiency  Assess efficacy of supplementation with   - cholecalciferol, vitamin D3, 2,000 unit Tab; Take 1 tablet (2,000 Units total) by mouth daily.    Note: she will be seeing a rheumatologist in June of aches and fatigue (negative work up here ) and may consider possibility of treating fibromyalgia if that visit does not reveal any other issues    Return in about 3 months (around 8/14/2019). - annual exam and follow up     Megan Liu MD     -------------------------------------------    Chief Complaint   Patient presents with     Mass     lump under sternum, further testing      Leny has an epigastric mass that has not gone away - we noted it at her physical 12/14/18 and ultrasound was normal.  She contacted me recently and I tired to order further imaging, but was informed by her insurance that the initial visit was to distant and I would need to see her again to order further imaging. She says the lump has not changed size, and still only hurts with pressure.     Rib pain  - in lower posterior left side is still bothering her  - used to be just when she is breathing deeply, but now occurs all the time.  She says it is NOT painful, just there\.  WE did and  xray two years ago      XR CHEST PA AND LATERAL  7/21/2017 11:25 AM     INDICATION: Chest pain.  COMPARISON: None.     FINDINGS: Negative chest.     This report was electronically interpreted by: Dr. Pito Wall MD ON 07/21/2017 at 11:59     Premenstrual dysphoric disorder :  Leny was taking sertraline for a while -  She was only  at 75 mg and felt like it wasn't doing anything. Stopped it on her own - tapered off slowly - had bad withdrawal for a couple of months but is over that and is not interested in restarted medications at this time.  She thinks she has general  stressors that are 'showing up' on the H.Q. below - finances, single Mom. She is getting plenty of sleep. She recently broke up with boyfriend- she needed alone time.     Little interest or pleasure in doing things: Nearly every day  Feeling down, depressed, or hopeless: Nearly every day  Trouble falling or staying asleep, or sleeping too much: Not at all  Feeling tired or having little energy: Nearly every day  Poor appetite or overeating: Not at all  Feeling bad about yourself - or that you are a failure or have let yourself or your family down: Several days  Trouble concentrating on things, such as reading the newspaper or watching television: More than half the days  Moving or speaking so slowly that other people could have noticed. Or the opposite - being so fidgety or restless that you have been moving around a lot more than usual: Not at all  Thoughts that you would be better off dead, or of hurting yourself in some way: Several days  PHQ-9 Total Score: 13  If you checked off any problems, how difficult have these problems made it for you to do your work, take care of things at home, or get along with other people?: Somewhat difficult    Leny still has headache every day. Saw neurologist last August for headaches - recommended tizanidine - she didn't want to do that.  Went to chiropractor - nothing helped  Last week she tried tizanidine and it didn't help.MRI of neck - ordered by chiropractor and she brings a copy - c5-c7 central disk disc protrusion almost contacting spinal cord. We discussed the availability of spine care through [facility]     Gong to rheumatologist next month - pain and achiness especially  hands and feet , but also widespread pain.  And fatigue.  Exercise is  hard I had previously done a work up for autoimmune diease and lyme disease - negative. She knows that she may meet criteria for Fibromyalgia, but just wants to check in with rheumatologist first .  She does not feel herself and thinks there must be something else going on    Also has issues with low back pain -  she can hardly stand - di PT for a little while    Takes 2000 iu and Calcium D 800     Wt Readings from Last 3 Encounters:   05/14/19 185 lb 12.8 oz (84.3 kg)   12/14/18 178 lb (80.7 kg)   12/04/18 179 lb 14.4 oz (81.6 kg)         Current Outpatient Medications on File Prior to Visit   Medication Sig Dispense Refill     calcium carbonate/vitamin D3 (CALCIUM 600 + D,3, ORAL) Take by mouth daily.       copper (PARAGARD T 380A) 380 square mm IUD IUD 1 each by Intrauterine route once.       ferrous sulfate (IRON ORAL) Take 1 tablet by mouth With Vitamin c .       magnesium 250 mg Tab Take 1 tablet by mouth daily.       No current facility-administered medications on file prior to visit.          Health Maintenance Due   Topic Date Due     PAP SMEAR  07/15/2019     Health Maintenance reviewed - .    The history section was last reviewed by Emmy Devine, ANDRY on May 14, 2019.    Social History     Tobacco Use   Smoking Status Never Smoker   Smokeless Tobacco Never Used       Social History     Substance and Sexual Activity   Alcohol Use Yes    Comment: Wine a couple nights a week.         Vitals:    05/14/19 0950   BP: 100/78   Pulse: 70     Body mass index is 31.89 kg/m .     EXAM:    General appearance - alert, well appearing, and in no distress  Mental status - anxious  Mood; normal behavior, speech, dress, motor activity, and thought processes  Abdomen - soft, nondistended, no masses or organomegaly; 1.5 by 4 cm tender mass in epigastric area, just below xyphoid

## 2021-05-29 ENCOUNTER — RECORDS - HEALTHEAST (OUTPATIENT)
Dept: ADMINISTRATIVE | Facility: CLINIC | Age: 41
End: 2021-05-29

## 2021-05-31 ENCOUNTER — RECORDS - HEALTHEAST (OUTPATIENT)
Dept: ADMINISTRATIVE | Facility: CLINIC | Age: 41
End: 2021-05-31

## 2021-05-31 VITALS — HEIGHT: 64 IN | WEIGHT: 166 LBS | BODY MASS INDEX: 28.34 KG/M2

## 2021-05-31 NOTE — PATIENT INSTRUCTIONS - HE
A Healthy You!    Getting and Staying Active  Why should I exercise?   Exercise, being physically active, is very important for all women.   Being active can help you:   Lose or maintain weight   Have more energy   Sleep better   Enjoy sex more   Relieve stress and think better   Lower the chance you will have heart disease, high blood pressure, and diabetes   Strengthen your bones and muscles   Have fewer hot flashes if you are older   How active do I have to be to get the bene?ts of exercise?   Studies show that as little as 15 minutes of moderate exercise--like fast walking or dancing--3 times a week can improve the health of your heart. Ifyou want to get the best benefits from exercise, try to increase your physical activity to at least 30 minutes, 5 times a week. If you have a serious health problem,be sure to talk with your health care provider before starting an exercise program.    Taking Care of your Health: Health Care Maintenance Screening recommendations  In all the things women do, taking care of everything and everybody else, they sometimes forget to take care of themselves. This handout reviews the health screenings and vaccines that are recommended for women of all ages. Talk with yourhealth care provider about which tests and vaccines you need. The chart below lists the screening tests and vaccinations recommended for healthy women who do not have a high risk for most diseases.   The recommendations in thischart are guidelines only. For some tests and vaccines, the chart says you should talk to your health care provider.This is because the best recommendations for you depend on your personal health history. Your health care provider may suggest more frequent testing or additional tests ifyou havea higher chance of getting some diseases    Planning Your Family: Developing a Reproductive Life Plan    Planning ahead can help you avoid getting pregnant when you don t want to be pregnant and also be in  "good health if and when you do decide to become pregnant. Many women have at least one pregnancy in their lives, even if it was not planned. In fact, about half of all pregnancies are not planned. Getting pregnant when you did not plan it can be a problem, or it can turn into a happy event. Planning pregnancy leads to healthier pregnancies, healthier mothers, and healthier families.  Although many women want to have a family, not everyone wants to have children. More and more women are childless by choice (also known as childfree). Whether to have children is a personal choice that only you can make. It s okay not to want children! If you never want to get pregnant, it is important to make sure you always use very effective birth control, such as an intrauterine device, the birth control implant, female sterilization (having your tubes tied), or your partner having a vasectomy.    Reliable resources:        American College of Nurse-Midwives (ACNM) http://www.midwife.org/; look at the informational handouts at http://www.midwife.org/Share-With-Women        Women's Health.gov:  http://www.womenshealth.gov/a-z-topics/index.html    FDA - Nutrition  www.mypyramid.gov  Under \"For Consumers,\" click on \"pregnant and breastfeeding women.\"      Vaccines : Centers for Disease Control and Prevention (CDC) http://www.cdc.gov/vaccines/     [facility] www.[employer].com     When researching information on the web, question the validity of websites.  The [place] .gov, .edu and.org tend to be more reliable information.  If there are a lot of advertisements, be cautious of the information provided. Stay away from blogs and chat rooms please!          Nutrition and supplements:     Daily multivitamin vitamin (with 400 - 1000 mcg folic acid).  Take with food as needed.     4-5 servings of dairy or other calcium rich foods (fish, leafy greens, soy) per day - if not, take 500-1000 mg additional calcium (Tums, pills, chews). Take with " dairy.     Vitamin D3 4000 IU geltab daily. Vitamin D rich foods: Cod Liver Oil (1Tbsp), [place], Mackerel, Tuna, fortified milk and yogurt, Beef and liver, sardines, egg, fortified cereals, swiss cheese.  Take supplements with fattiest meal.       2-3 (4) oz servings of fish, seafood, nuts (walnuts & almonds), oils, avocado per week - if not, take Omega 3 Fatty acids: DHA & CHEN 7551-0910 mg per day.  Other names: cod liver oil, fish oil. Take with fattiest meal.

## 2021-05-31 NOTE — PROGRESS NOTES
"Problem Visit    08/22/19      Subjective:    Leny Jeffrey is a 38 y.o. female who presents for cervical cancer screening. She has a history of abnormal pap and LEEP performed 2014, 7/2016 wnl and due 7/2019. She reports no other concerns today. She was last seen for annual exam with family medicine 12/2018 and will continue to be followed in their office for other health maintenance screening.    ROS:   General: Denies fevers, chills, night sweats.  Skin: Denies new rashes or lesions.  HEENT: Denies headache, visual disturbance, throat swelling or difficulty swallowing.  CV: Denies chest pain, palpitations or shortness of breath.  GI: Denies N/V/D, blood in stool or constipation.  : Denies dysuria or polyuria.  Genital: Denies discharge.  MSK: Denies joint pain, muscles aches or difficulty ambulating.  Neurologic: Denies difficulty   Endocrine: Denies hot/cold intolerance, sweating, polyuria.  Psychiatric: Denies depression or anxiety.      Objective:    /74 (Patient Site: Right Arm, Patient Position: Sitting, Cuff Size: Adult Regular)   Pulse 80   Ht 5' 4\" (1.626 m)   Wt 186 lb (84.4 kg)   LMP 07/26/2019 (Exact Date)   BMI 31.93 kg/m      Physical Exam:  General Appearance: Alert, cooperative, no distress, appears stated age  Skin: Skin color, texture, turgor normal, no rashes or lesions.  Throat: Lips, mucosa, and tongue normal; teeth and gums normal  HEENT: grossly normal; otoscopic and opthalmic exam not performed.   Neck: Supple, symmetrical, trachea midline, no adenopathy  Respiratory: Normal respiratory effort  Cardiovascular: No peripheral edema.  Musculoskeletal: No digital cyanosis. Normal gait and station. Moves all limbs freely.  Neuro: Cranial nerves II-XII grossly intact.  Psych: Intact judgment and insight. OX3 and conversational.  Pelvic exam: normal external genitalia, vulva, vagina, cervix, uterus and adnexa, PAP: Pap smear done today. Paragard strings present as os and tucked to " posterior vaginal vault, >2cm      Assessment/Plan:  -cervical cancer screening; pap smear obtained and will notify with results. Pt prefers call with abnormal results and will view myChart if wnl.  -Paragard IUD in proper placement, pt reports no concerns with this method.  -continue annual exams with PCP  -RTC as needed.    Total time with patient 15 minutes with >50% on education, counseling and coordination of care.    Evi Chawla, DNP, APRN, CNM

## 2021-06-01 VITALS — BODY MASS INDEX: 28.27 KG/M2 | WEIGHT: 166 LBS

## 2021-06-01 VITALS — HEIGHT: 64 IN | BODY MASS INDEX: 28.34 KG/M2 | WEIGHT: 166 LBS

## 2021-06-02 VITALS — WEIGHT: 178 LBS | BODY MASS INDEX: 30.39 KG/M2 | HEIGHT: 64 IN

## 2021-06-02 VITALS — WEIGHT: 179.9 LBS | BODY MASS INDEX: 30.71 KG/M2 | HEIGHT: 64 IN

## 2021-06-03 VITALS — BODY MASS INDEX: 31.72 KG/M2 | WEIGHT: 185.8 LBS | HEIGHT: 64 IN

## 2021-06-03 VITALS — BODY MASS INDEX: 31.76 KG/M2 | HEIGHT: 64 IN | WEIGHT: 186 LBS

## 2021-06-03 VITALS
SYSTOLIC BLOOD PRESSURE: 96 MMHG | DIASTOLIC BLOOD PRESSURE: 62 MMHG | BODY MASS INDEX: 32.4 KG/M2 | HEART RATE: 72 BPM | WEIGHT: 188.75 LBS

## 2021-06-03 NOTE — PROGRESS NOTES
Assessment and Plan    1. PMDD (premenstrual dysphoric disorder)  Duloxetine is causing sexual aside effects at only 30 mg - she will stay on this until new medication of Wellbutrin is established, the try going off.  We'll follow up at her physical exam on 12/17/19  - buPROPion (WELLBUTRIN XL) 150 MG 24 hr tablet; Take 1 tablet (150 mg total) by mouth every morning.  Dispense: 30 tablet; Refill: 2    Megan Liu MD     -------------------------------------------    Chief Complaint   Patient presents with     Depression     Back Pain     low back pain     Premenstrual dysphoric disorder   Leny had originally tried sertraline for PMMD, but at 75 mg daily felt it was not helping ,and stopped,  Her rheumatologist later started her on duloxetine hoping that this could also help address chronic pains, - it worked for the PMMD , but not for the chronic pains and also cause undesirable sexual side effects. She would now like to try Bupropion, as this typically does not have sexual side effect    Last December at her physical Leny had mentioned fatigue and body aches - thyroid studies, comp panel, CBC, and CRP were normal at that time.  This past June she started seeing a rheumatologist at [facility] - who did additional studies including an MICHELLE that was positive - he thinks it is  possible she has Lupus and thought she could try plaquenil. She also mentions random skin lesions.       Leny Gets headache daily - she is going to the headache clinic at [facility] - neurologist at [facility] said tension headache - recommended chiropractic care - that didn't help.  Daily - wakes up with them.  There have been only 4 days in October without a headache  Imitrex makes her feel sick, but takes it for bad headaches    Did online test for food sensitivity - only thing noted  was coffee - and she has stopped this    Current Outpatient Medications on File Prior to Visit   Medication Sig Dispense Refill     ascorbic acid, vitamin C,  (VITAMIN C) 500 MG tablet Take 250 mg by mouth.       aspirin-acetaminophen-caffeine (EXCEDRIN MIGRAINE) 250-250-65 mg per tablet Take 1 tablet by mouth every 6 (six) hours as needed for pain.       calcium carbonate/vitamin D3 (CALCIUM 600 + D,3, ORAL) Take by mouth daily.       cholecalciferol, vitamin D3, 2,000 unit Tab Take 1 tablet (2,000 Units total) by mouth daily.       copper (PARAGARD T 380A) 380 square mm IUD IUD 1 each by Intrauterine route once.       DULoxetine (CYMBALTA) 30 MG capsule Take 30 mg by mouth.       ferrous sulfate (IRON ORAL) Take 1 tablet by mouth With Vitamin c .       magnesium 250 mg Tab Take 1 tablet by mouth daily.       SUMAtriptan (IMITREX) 50 MG tablet Take 50 mg by mouth every 2 (two) hours as needed for migraine.       No current facility-administered medications on file prior to visit.          There are no preventive care reminders to display for this patient.  Health Maintenance reviewed - .    The history section was last reviewed by Blank Menjivar CMA on Nov 5, 2019.    Social History     Tobacco Use   Smoking Status Never Smoker   Smokeless Tobacco Never Used       Social History     Substance and Sexual Activity   Alcohol Use Yes     Alcohol/week: 0.0 - 2.0 standard drinks    Comment: Wine a couple nights a week.         Vitals:    11/05/19 1557   BP: 96/62   Pulse: 72     Body mass index is 32.4 kg/m .     EXAM:    General appearance - alert, well appearing, and in no distress  Mental status - normal mood, behavior, speech, dress, motor activity, and thought processes

## 2021-06-04 VITALS
WEIGHT: 183.25 LBS | DIASTOLIC BLOOD PRESSURE: 68 MMHG | BODY MASS INDEX: 31.45 KG/M2 | SYSTOLIC BLOOD PRESSURE: 92 MMHG | OXYGEN SATURATION: 99 % | HEART RATE: 70 BPM

## 2021-06-04 VITALS
BODY MASS INDEX: 31.95 KG/M2 | WEIGHT: 187.12 LBS | SYSTOLIC BLOOD PRESSURE: 120 MMHG | DIASTOLIC BLOOD PRESSURE: 82 MMHG | HEIGHT: 64 IN | HEART RATE: 84 BPM

## 2021-06-04 VITALS
SYSTOLIC BLOOD PRESSURE: 110 MMHG | WEIGHT: 172 LBS | HEART RATE: 76 BPM | HEIGHT: 64 IN | DIASTOLIC BLOOD PRESSURE: 66 MMHG | BODY MASS INDEX: 29.37 KG/M2

## 2021-06-04 NOTE — PROGRESS NOTES
[facility]   Initial Evaluation    Patient Name: Leny Jeffrey  Date of evaluation: 12/26/2019  Visit number: 1/12  Referring Provider: Megan Liu MD  Referring Diagnosis:   Chronic left-sided low back pain without sciatica [M54.5, G89.29]         Visit Diagnosis:     ICD-10-CM    1. Chronic left-sided low back pain without sciatica M54.5     G89.29    2. Generalized muscle weakness M62.81    3. Chronic neck pain M54.2     G89.29    4. Decreased range of motion of trunk and back M25.60        Assessment:        Leny Jeffrey is a 39 y.o. female who presents to therapy today with chief complaints of chronic low back pain with B hip pain, upper back and neck pain with headaches. Onset date of sx was insidious for most of life with intermittent low back pain and headaches but injured low back in 2011 when trying to help a pt out of bed and then over the past 1-2 years pain intensity levels have worsened.  Last summer pt was trying chiropractic care and sx seemed to worsen. Pt reported h/o possible lupus diagnosis over the past year, increased weight gain of 30 lbs over past 1-2 years as well as started anti-depressants and headache medication.  Pain symptoms are mild to severe in head, neck upper back, low back and into anterior pelvis.  Functional impairments include difficulty and pain with sleeping and bed mobility, transfers, bending, lifting, turning head and house/yard work.  Pt demo's signs and sx consistent with lumbar flexion preference with increased lordotic curve and decreased trunk/core strength.     Pt. is appropriate for skilled PT intervention as outlined in the Plan of Care (POC).  Pt. is a good candidate for skilled PT services to improve pain levels and function.    Goals:  Pt. will demonstrate/verbalize independence in self-management of condition in : 12 weeks  Pt. will be independent with home exercise program in : 12 weeks;Comment  Comment:: for aerobic and strength training  programming for improved physical condition with minimal pain and difficulty  Pt. will have improved quality of sleep: with less pain;waking less times/night;in 12 weeks  Patient will stand : 10 minutes;with less pain;with less difficultty;for home chores;for work;in 12 weeks;Comment  Comment:: pain <3/10  Patient will sit: 30 minutes;for driving;for work;for eating;for watching TV;with less pain;with less difficultty;in 12 weeks;Comment  Comment: pain <3/10    Patient will decrease : NIKKIE score;by _ points;for improved quality of function;for improved quality of life;in 12 weeks  by ___ points: 6      Patient's expectations/goals are realistic.    Barriers to Learning or Achieving Goals:  Chronicity of the problem.       Plan / Patient Instructions:      Plan for next visit: Assess response to bridge, pelvic clock, TA with toe tap and walking program.  Attempt addition with cervical strengthening, prone ext over edge of bed and push ups.    Plan of Care:   Communication with: Referral Source  Patient Related Instruction: Nature of Condition;Treatment plan and rationale;Self Care instruction;Basis of treatment;Body mechanics;Posture;Precautions;Next steps;Expected outcome  Times per Week: 1x/every 2-3 weeks   Number of Weeks: 12  Number of Visits: 12  Discharge Planning: when indicated  Precautions / Restrictions : none  Therapeutic Exercise: ROM;Stretching;Strengthening  Neuromuscular Reeducation: posture;TNE;core;other  Neuromuscular Re-education: neurodynamics  Manual Therapy: soft tissue mobilization;joint mobilization;muscle energy  Functional Training (ADL's): self care;ADL's      Treatment techniques, plan of care, and goals were discussed with the patient.  The patient agrees to the plan as outlined.  The plan of care is dynamic and will be modified on an ongoing basis.       Subjective:       Social information:   Occupation:RN   Work Status:Working part time    History of Present Illness:      Pt reports in   she had an injury when she was helping move a patient that she had meds for that initially got better.  Pt reports over the past year the intermittent back pain has worsened and it bothers all over her back and hips.  Pain is worse in the morning and can get a little better throughout day.   Pt tends to sleep on her stomach.  Pt started  PMHx - pt had headaches last summer 2018.  Pt reports upper back is also hurting and tight that feels like it worsened also summer when she was seeing a chiropractor.  Pt saw rheumatologist - maybe borderline lupus - will follow up with him every 6 months and decide if she needs to attempt medication at some point.  Pt also saw a neurologist and she prescribed topamax for headaches which seem to be helping.  Pt also has worked with her MD for an anti-depressant med and her current one is not causing side effects but not sure how well it is working.  Pt is sitting a lot for work and she tries to get up whenever she can.  Pt also reports that she has gained maybe around 30 lbs over the past 1-2 years and maybe 40 lbs over the past 5 years - not sure why but hasn't been exercising regularly so that could have contributed.  Pt reports she has participated in obstacle course races, long distance running and heavier weight lifting.   Scans with cervical spine showed C3-4 and C6-7 disc dysfunction in 2018.  Pt reports she follows a pescatarian diet and tries to get enough protein daily.    Pain Ratin}  Pain rating at best: 3  Pain rating at worst: 8  Pain description: aching, burning, sharp, soreness, tingling and weakness    Patient reports benefit from:  movement or exercise        Objective:      Patient Outcome Measures :    No data recorded     Scores range from 0-100%, where a score of 0% represents minimal pain and maximal function. The minimal clinically important difference is a score reduction of 12%.    Precautions/Restrictions: None  Involved side: Bilateral  Posture  Observation:      General sitting posture is  fair mild rounded shoulders with forward head, increased lumbar lordosis.    Gait: WNL with increased lordosis.    Palpation: No tenderness L glut/greater trochanter/SIJ.  Hypermobility with lumbar P/A jt mobs with tenderness from pressure.  Hypomobile thoracic spine P/A with tenderness from pressure.    ROM: Cervical flex WNL with pain L thoracic area, ext WNL with tightness both sides of neck, R rotation min limited with slightly sharper pain by spine, L rotation WNL more muscular tightness, R SB min limited with tightness L side, L SB WNL with tightness R side    Thoracic flexion increases low back pain but feels pretty good, ext increases low back pain, B rotation WNL without pain    Trunk flexion increases low back pain upper to mid range but min limited with fingertips to floor, ext min limited with increased back pain, B SB WNL without pain    B hip ROM WNL without pain    Strength:  B shoulder flexors, ER and triceps 4/5 without pain, biceps and IR and  5/5  B hip ext and abd 4+/5  Pt struggles with hold and pain with prone trunk extension and with hooklying mini-crunch     Sensation:  B UE and LE intact with light touch    Special tests:  Negative repeated measures for change to pain sx but flexion bias preference for relief of low back pain - possible only spondylolisthesis from gymnastics    Treatment Today      TREATMENT MINUTES COMMENTS   Evaluation 45 Neck/Back   Self-care/ Home management     Manual therapy     Neuromuscular Re-education 15 Education: Patient educated on the concept of the nervous system as the bodies alarm system, and the role of nociception to warn the body of danger. Peripheral nerve sensitization, hyperalgesia and allodynia were explained using metaphors to promote deep learning.        Therapeutic Activity     Therapeutic Exercises 24 Educated pt on importance of aerobic exercise for chronic pain sx and discussed walking program  2-3x/week. Initiated and performed HEP with pelvic clock, bridge and TA with toe taps per pt instructions and printed for home.    Gait training     Modality__________________                Total 84    Blank areas are intentional and mean the treatment did not include these items.        PT Evaluation Code: (Please list factors)  Patient History/Comorbidities: see subj  Examination: neck/back  Clinical Presentation: stable  Clinical Decision Making: low    Patient History/  Comorbidities Examination  (body structures and functions, activity limitations, and/or participation restrictions) Clinical Presentation Clinical Decision Making (Complexity)   No documented Comorbidities or personal factors 1-2 Elements Stable and/or uncomplicated Low   1-2 documented comorbidities or personal factor 3 Elements Evolving clinical presentation with changing characteristics Moderate   3-4 documented comorbidities or personal factors 4 or more Unstable and unpredictable High       Sakina Natarajan PT, DPT, OCS, CLT  12/26/2019  3:36 PM

## 2021-06-04 NOTE — PATIENT INSTRUCTIONS - HE
At the end of this moth if 150 mg isn't working, call and I will increase to 300 mg  n  - Find a way to exercise that you like:    - Check out www.[employer] for exercise videos - they have some videos that are just 10  Minutes(check out 12 minute crunchless ab workout)     - Check out your local recreation center for access to exercise equipment, walking tracks and classes - these are often less expensive than joining a gym     - Check out the web sites for [employer], [employer],[place] [employer], or your local neighborhood facebook group for free exercise equipment

## 2021-06-04 NOTE — PROGRESS NOTES
FEMALE PREVENTATIVE EXAM    Assessment and Plan:       1. Routine general medical examination at a health care facility    2. Chronic left-sided low back pain without sciatica  - Ambulatory referral to Adult PT- Internal    3. Migraine with aura and without status migrainosus, not intractable  - SUMAtriptan (IMITREX) 50 MG tablet; Take 1 tablet (50 mg total) by mouth every 2 (two) hours as needed for migraine.  Dispense: 10 tablet; Refill: 0    4. Weight gain  - Thyroid Stimulating Hormone (TSH)    5. High risk medication use  - Basic Metabolic Panel        Next follow up:  Return in about 1 year (around 12/17/2020) for or earlier as needed.    Immunization Review  Adult Imm Review: No immunizations due today      I discussed the following with the patient:   Adult Healthy Living: Importance of regular exercise  Healthy nutrition  Getting adequate sleep     Patient Instructions   At the end of this moth if 150 mg isn't working, call and I will increase to 300 mg  n  - Find a way to exercise that you like:    - Check out www.[employer] for exercise videos - they have some videos that are just 10  Minutes(check out 12 minute crunchless ab workout)     - Check out your local recreation center for access to exercise equipment, walking tracks and classes - these are often less expensive than joining a gym     - Check out the web sites for [employer], [employer],[place] [employer], or your local neighborhood [employer] group for free exercise equipment              Subjective:   Chief Complaint: Leny Jeffrey is an 39 y.o. female here for a preventative health visit.     HPI:     I last saw Leny on 11/5/19 for a follow up of premenstrual dysphoric disorder.  Duloxetine was  causing sexual aside effects at only 30 mg - I had her stay on this until  new medication of Wellbutrin is established, the try going off.She says today that going off Cymbalta was terrible - nauseated for a month straight.  With  Wellbutrin she denies feeling cranked up of having a difficult time sleeping (when she started taking it in the am) and so far she has had no sexual side effects    Migraines: Leny saw a neurologist at [facility] - she started her on Topamax gradually - Leny has had some episodes of had been wetting the bed at night, which can be a side effects of Topamax.  She works at [employer] [place], and did an UA there on herself to make sure her symptoms were not due to a UTI - it was normal.  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  Sometimes even moving in bed is hard.  Just slightly to left of spine; doesn't go down legs, doesn't hurt to the touch. Never goes away - hurts badly in the morning when she gets up - more better with moving.  Bending over makes it worse - lifting things makes it worse.  Upper back hurts too. Had MRI thoracic spine by chiropractor - disc disease.  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    Healthy Habits  Are you taking a daily aspirin? No  Do you typically exercising at least 40 min, 3-4 times per week?  NO-has more time - less motivation  Do you usually eat at least 4 servings of fruit and vegetables a day, include whole grains and fiber and avoid regularly eating high fat foods? Yes  Have you had an eye exam in the past two years? Yes  Do you see a dentist twice per year? Yes  Do you have any concerns regarding sleep? No    Safety Screen  If you own firearms, are  "they secured in a locked gun cabinet or with trigger locks? The patient does not own any firearms  Do you feel you are safe where you are living?: Yes (12/17/2019  3:34 PM)  Do you feel you are safe in your relationship(s)?: Yes (12/17/2019  3:34 PM)      Review of Systems:   Constitutional: negative for recent illness or change in weight;  tendency to motion sickness  Eyes: negative for irritation and vision change  Ears, nose, mouth, throat, and face: negative for nasal congestion and sore throat  Respiratory: negative for cough and dyspnea on exertion  Cardiovascular: negative for chest pain and palpitations  Gastrointestinal: negative for abdominal pain and change in bowel habits  Genitourinary:negative for dysuria, frequency and hesitancy; urgency with Topamax;  Integument/breast: negative for rash  Hematologic/lymphatic: negative for bleeding and easy bruising        Cancer Screening       Status Date      PAP SMEAR Next Due 8/22/2022      Done 8/22/2019 GYNECOLOGIC CYTOLOGY (PAP SMEAR)     Patient has more history with this topic...            History     Reviewed By Date/Time Sections Reviewed    Lucila Freeman CMA 12/17/2019  3:38 PM Tobacco            Objective:   Vital Signs:   Visit Vitals  /82 (Patient Site: Right Arm, Patient Position: Sitting, Cuff Size: Adult Regular)   Pulse 84   Ht 5' 4\" (1.626 m)   Wt 187 lb 1.9 oz (84.9 kg)   LMP 11/24/2019 (Exact Date)   Breastfeeding No   BMI 32.12 kg/m           PHYSICAL EXAM  General appearance - alert, well appearing, and in no distress  Mental status - normal mood, behavior, speech, dress, motor activity, and thought processes  Eyes - pupils equal and reactive, extraocular eye movements intact, funduscopic exam normal, discs flat and sharp  Ears - bilateral TM's and external ear canals normal  Mouth - mucous membranes moist, pharynx normal without lesions  Neck - supple, no significant adenopathy, carotids upstroke normal bilaterally, no bruits, " thyroid exam: thyroid is normal in size without nodules or tenderness  Chest - clear to auscultation, no wheezes, rales or rhonchi, symmetric air entry  Heart - normal rate, regular rhythm, normal S1, S2, no murmurs, rubs, clicks or gallops  Abdomen - soft, nontender, nondistended, no masses or organomegaly  Breasts - breasts appear normal, no suspicious masses, no skin or nipple changes or axillary nodes  Neurological - alert, oriented, normal speech, no focal findings or movement disorder noted, DTR's normal and symmetric  Extremities - peripheral pulses normal, no pedal edema, no clubbing or cyanosis  Skin - no rashes or worrisome lesions

## 2021-06-05 VITALS
HEART RATE: 90 BPM | WEIGHT: 166 LBS | BODY MASS INDEX: 28.34 KG/M2 | DIASTOLIC BLOOD PRESSURE: 68 MMHG | SYSTOLIC BLOOD PRESSURE: 100 MMHG | OXYGEN SATURATION: 100 % | HEIGHT: 64 IN

## 2021-06-05 VITALS
HEART RATE: 64 BPM | SYSTOLIC BLOOD PRESSURE: 100 MMHG | BODY MASS INDEX: 28.77 KG/M2 | WEIGHT: 168.5 LBS | DIASTOLIC BLOOD PRESSURE: 70 MMHG | HEIGHT: 64 IN

## 2021-06-05 VITALS
DIASTOLIC BLOOD PRESSURE: 60 MMHG | HEIGHT: 64 IN | BODY MASS INDEX: 28.68 KG/M2 | WEIGHT: 168 LBS | HEART RATE: 84 BPM | SYSTOLIC BLOOD PRESSURE: 110 MMHG | OXYGEN SATURATION: 100 %

## 2021-06-05 NOTE — PROGRESS NOTES
[facility] Daily Progress     Patient Name: Leny Jeffrey  Date: 1/30/2020  Visit #: 3/12  Referring Provider: Megan Liu MD  Referring Diagnosis:   Chronic left-sided low back pain without sciatica [M54.5, G89.29]           Visit Diagnosis:     ICD-10-CM    1. Chronic left-sided low back pain without sciatica M54.5     G89.29    2. Generalized muscle weakness M62.81    3. Chronic neck pain M54.2     G89.29    4. Decreased range of motion of trunk and back M25.60        Assessment:     Pt demo's increased lumbar sensitivity with extension and with transition forward flex to neutral.  Pt demo's sx similar to spondylolisthesis.    Patient is benefitting from skilled physical therapy and is making steady progress toward functional goals.  Patient is appropriate to continue with skilled physical therapy intervention, as indicated by initial plan of care.    Goal Status: on going  Pt. will demonstrate/verbalize independence in self-management of condition in : 12 weeks  Pt. will be independent with home exercise program in : 12 weeks;Comment  Comment:: for aerobic and strength training programming for improved physical condition with minimal pain and difficulty  Pt. will have improved quality of sleep: with less pain;waking less times/night;in 12 weeks  Patient will stand : 10 minutes;with less pain;with less difficultty;for home chores;for work;in 12 weeks;Comment  Comment:: pain <3/10  Patient will sit: 30 minutes;for driving;for work;for eating;for watching TV;with less pain;with less difficultty;in 12 weeks;Comment  Comment: pain <3/10    Patient will decrease : NIKKIE score;by _ points;for improved quality of function;for improved quality of life;in 12 weeks  by ___ points: 6      Plan / Patient Education:     Continue with initial plan of care.  Assess response to possible SI belt or bouncing on ball/hooklying rocking for back pain relief.  Attempt counter push ups, reassess trunk ROM and strength and  "add moni chair (in pt's garage) if doing well.      Subjective:     Pain Ratin   Pt reports she was sick last week so stayed home from work a couple of days with headache and neausea and then pt reports she woke with increased back pain. Pain rating around 6/10 with sitting but increases to 7-8 with transitioning.  Pt hasn't done as much with HEP due to illness.  Pt reports she has gone to the [place] [facility] a couple of times.    Objective:     Trunk flex WNL but increased L side low back with return to straight around the 30-45 degree bend dilip (was WNL but painful initially), ext mod limited with pain L of center low back (was WNL but painful initially), B SB WNL without pain    Non tender lumbar paraspinals, mild hypo lumbar spine today    FROM LAST VISITS  CCFT - pt able to perform chin tuck x10 seconds for 4-10 mmHg change - unable to hold x10 seconds at 12 mmHg change    Treatment Today      TREATMENT MINUTES COMMENTS   Evaluation     Self-care/ Home management 14 Discussed positions in supine/hooklying and seated on ball for relaxation techniques with bouncing and relief of pain sx.  Discussed possibility of SI best to help support lumbar spine when it flares up.   Manual therapy 12 R S/L lumbar rotation jt mobs grade II/IV x30\" oscillations. Supine hooklying over bolster B long axis distraction grade II/III x30\" oscillations.   Neuromuscular Re-education     Therapeutic Activity     Therapeutic Exercises 8 Reassessed lumbar ROM and discussed results.  Reviewed HEP and performed and emphasized core strength for assisting with back pain sx.   Gait training     Modality__________________                Total 34    Blank areas are intentional and mean the treatment did not include these items.       Sakina Natarajan PT, DPT, OCS, CLT  2020  9:16 AM      "

## 2021-06-05 NOTE — PATIENT INSTRUCTIONS - HE
Try  SI belt    Bouncing on ball  Knee to chest - or laying on floor legs on couch - can bounce heels back and forth - looking for relief!

## 2021-06-05 NOTE — PROGRESS NOTES
[facility] Daily Progress     Patient Name: Leny Jeffrey  Date: 1/16/2020  Visit #: 2/12  Referring Provider: Megan Liu MD  Referring Diagnosis:   Chronic left-sided low back pain without sciatica [M54.5, G89.29]           Visit Diagnosis:     ICD-10-CM    1. Chronic left-sided low back pain without sciatica M54.5     G89.29    2. Generalized muscle weakness M62.81    3. Chronic neck pain M54.2     G89.29    4. Decreased range of motion of trunk and back M25.60        Assessment:     Pt demo's ability to perform advanced strength training with minimal difficulty. Pt demo's mild cervical endurance limitations.    Patient is benefitting from skilled physical therapy and is making steady progress toward functional goals.  Patient is appropriate to continue with skilled physical therapy intervention, as indicated by initial plan of care.    Goal Status: on going  Pt. will demonstrate/verbalize independence in self-management of condition in : 12 weeks  Pt. will be independent with home exercise program in : 12 weeks;Comment  Comment:: for aerobic and strength training programming for improved physical condition with minimal pain and difficulty  Pt. will have improved quality of sleep: with less pain;waking less times/night;in 12 weeks  Patient will stand : 10 minutes;with less pain;with less difficultty;for home chores;for work;in 12 weeks;Comment  Comment:: pain <3/10  Patient will sit: 30 minutes;for driving;for work;for eating;for watching TV;with less pain;with less difficultty;in 12 weeks;Comment  Comment: pain <3/10    Patient will decrease : NIKKIE score;by _ points;for improved quality of function;for improved quality of life;in 12 weeks  by ___ points: 6      Plan / Patient Education:     Continue with initial plan of care.  Assess response to added chin tuck, hip ext over edge of bed.  Attempt counter push ups, reassess trunk ROM and strength and add moni chair (in pt's garage) if doing  well.      Subjective:     Pain Ratin  Pt reports doing pretty good with HEP. Pt hasn't noticed much change in sx yet but exercises feel good to do and pt has been walking up to 3x/week for exercise.      Objective:     CCFT - pt able to perform chin tuck x10 seconds for 4-10 mmHg change - unable to hold x10 seconds at 12 mmHg change    Treatment Today      TREATMENT MINUTES COMMENTS   Evaluation     Self-care/ Home management     Manual therapy     Neuromuscular Re-education     Therapeutic Activity     Therapeutic Exercises 24 Assessed cervical strength with CCFT and discussed results. Reviewed HEP and performed and added to HEP per pt instructions and printed AVS for home. Verbal cues given for proper technique.   Gait training     Modality__________________                Total 24    Blank areas are intentional and mean the treatment did not include these items.       Sakina Natarajan PT, DPT, OCS, CLT  2020  9:16 AM

## 2021-06-06 NOTE — TELEPHONE ENCOUNTER
Prior Authorization Request  Who s requesting:  Pharmacy  Pharmacy Name and Location: [facility] #[id number] [place] [address] [address] [facility]  Medication Name: TiZANidine (ZANAFLEX) 4 MG capsule    Insurance Plan: [employer]  Insurance Member ID Number:  [id number]  CoverMyMeds Key: [id number]  Informed patient that prior authorizations can take up to 10 business days for response:   NA  Okay to leave a detailed message: Yes

## 2021-06-06 NOTE — PATIENT INSTRUCTIONS - HE
You are being tested for Corona Virus 19, Influenza and possibly RSV.    We will call you with your results.    Isolate Yourself:    Isolate yourself while traveling.    Do Not allow any visitors within 6 feet.    Do Not go to work or school.    Do Not go to [employer],  centers, shopping, or other public places.    Do Not shake hands.    Avoid close contact with others (hugging, kissing).    Protect Others:    Cover Your Mouth and Nose with a mask, disposable tissue or wash cloth to avoid spreading germs to others.    Wash your hands and face frequently with soap and water    Call Back If: Breathing difficulty develops or you become worse.    For more information about COVID19 and options for caring for yourself at home, please visit the CDC website at https://www.cdc.gov/coronavirus/2019-ncov/about/steps-when-sick.html  For more options for care at [facility], please visit our website at [email or web address]

## 2021-06-06 NOTE — TELEPHONE ENCOUNTER
Central PA team  [phone]  Pool: HE PA MED ([id number])          PA has been initiated.       PA form completed and faxed insurance via Cover My Meds     Key:  [id number]     Medication:  TIZANIDINE     Insurance:  [employer]        Response will be received via fax and may take up to 5-10 business days depending on plan

## 2021-06-06 NOTE — PROGRESS NOTES
Assessment and Plan    1. Chronic left-sided low back pain with left-sided sciatica  Not responding to physical therapy   - MR Lumbar Spine Without Contrast; Future  She found old prescription for tizanidine helpful to allow her to sleep - I am giving her a new RX  - TiZANidine (ZANAFLEX) 4 MG capsule; Take 2 capsules (8 mg total) by mouth 2 (two) times a day as needed for muscle spasms.  Dispense: 60 capsule; Refill: 1    2. PMDD (premenstrual dysphoric disorder)  Well controlled on:   - buPROPion (WELLBUTRIN XL) 300 MG 24 hr tablet; Take 1 tablet (300 mg total) by mouth every morning.  Dispense: 90 tablet; Refill: 1    Return pending results of MRI.    Megan Liu MD     -------------------------------------------    Chief Complaint   Patient presents with     Back Pain     PT since DEC, but since Thursday 1/30/20     I had referred Leny for PT for her chronic left sided back pain; I got this note from her therapist yesterday    Darlenelo Dr. Liu,   Leny is scheduled for a follow up with you next Tuesday. She has been to 3 PT sessions over the past 6 weeks and she has been able to perform in a lumbar strengthening program but she has had minimal improvements to her L sided low back pain.   She presents with possible lumbar/SIJ instability and I wonder if she might have an old spondylolisthesis from her gymnastics days. I didn't find if she has had any recent imaging in her chart.   I told her to continue PT HEP as able as long as she doesn't feel like her sx are being exacerbated by the load and to let me know how things go when she returns to see you.   Thank you for this referral - I hope to continue to work with Leny if deemed appropriate.   Call or message with questions - [phone]   Thanks,   Sakina Natarajan PT, DPT, OSC, CLT     --  Leny says today that her symptoms started  getting worse again 3 weeks ago - some days better than others.  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 On her best days she has mild pain but can function normally.  On her worst days more recently she cannot  lean over past about 20 degrees flexion (sideways OK, extension hurts); in bed she can hardly get her hips off the bed -pain in left low spine. No pain down her leg. Even picking up light things she feels it in her back. Hasn't gotten an MRI in her back for the last 20 ears    Last three weeks she started taking tizanidine that her neurologist has previously prescribed in 2018  for migraine at 16 mg nightly.  While this did not work for migraines, it has worked to help her sleep at night.  She has found that any position she tries to sleep in is uncomfortable-    ---    Premenstrual Dysphoric Disorder  Wellbutrin is working well at 300  PHQ-9 Total Score: 3 (2/18/2020  5:00 PM)  Little interest or pleasure in doing things: Several days  Feeling down, depressed, or hopeless: Several days  Trouble falling or staying asleep, or sleeping too much: Not at all  Feeling tired or having little energy: Several days  Poor appetite or overeating: Not at all  Feeling bad about yourself - or that you are a failure or have let yourself or your family down: Not at all  Trouble concentrating on things, such as reading the newspaper or watching television: Not at all  Moving or speaking so slowly that other people could have noticed. Or the opposite - being so fidgety or restless that you have been moving around a lot more than usual: Not at all  Thoughts that you would be better off dead, or of hurting yourself in some way: Not at all  PHQ-9 Total Score: 3          Current Outpatient Medications on File Prior to Visit   Medication Sig Dispense Refill     ascorbic acid, vitamin C, (VITAMIN C) 500 MG tablet Take 250 mg by mouth.       aspirin-acetaminophen-caffeine (EXCEDRIN MIGRAINE) 250-250-65 mg per tablet Take 1 tablet by  mouth every 6 (six) hours as needed for pain.       calcium carbonate/vitamin D3 (CALCIUM 600 + D,3, ORAL) Take by mouth daily.       cholecalciferol, vitamin D3, 2,000 unit Tab Take 1 tablet (2,000 Units total) by mouth daily.       copper (PARAGARD T 380A) 380 square mm IUD IUD 1 each by Intrauterine route once.       ferrous sulfate (IRON ORAL) Take 1 tablet by mouth With Vitamin c .       ferrous sulfate 325 (65 FE) MG tablet Take 1 tablet by mouth.       magnesium 250 mg Tab Take 1 tablet by mouth daily.       SUMAtriptan (IMITREX) 50 MG tablet Take 1 tablet (50 mg total) by mouth every 2 (two) hours as needed for migraine. 10 tablet 0     topiramate (TOPAMAX) 50 MG tablet Take 1 tablet (50 mg total) by mouth 2 (two) times a day.       triamcinolone (KENALOG) 0.1 % ointment        magnesium 250 mg Tab Take 1 tablet by mouth.       ondansetron (ZOFRAN-ODT) 4 MG disintegrating tablet        prochlorperazine (COMPAZINE) 5 MG tablet        No current facility-administered medications on file prior to visit.          There are no preventive care reminders to display for this patient.  Health Maintenance reviewed - .    The history section was last reviewed by Lucila Freeman CMA on Feb 18, 2020.    Social History     Tobacco Use   Smoking Status Never Smoker   Smokeless Tobacco Never Used       Social History     Substance and Sexual Activity   Alcohol Use Yes     Alcohol/week: 0.0 - 2.0 standard drinks    Comment: Wine a couple nights a week.         Vitals:    02/18/20 1549   BP: 92/68   Pulse: 70   SpO2: 99%     Body mass index is 31.45 kg/m .     EXAM:    General appearance - alert, well appearing, and in no distress  Mental status - normal mood, behavior, speech, dress, motor activity, and thought processes

## 2021-06-06 NOTE — TELEPHONE ENCOUNTER
2-27-20  Hello just connecting back, Specialty scheduling doesn't contact patients on RX info  Thanks  Carie

## 2021-06-06 NOTE — PROGRESS NOTES
[facility] Discharge Summary    Patient Name: Leny Jeffrey  Date: 4/9/2020  Referring provider: Megan Liu MD  Visit Diagnosis:     ICD-10-CM    1. Chronic left-sided low back pain without sciatica  M54.5     G89.29    2. Generalized muscle weakness  M62.81    3. Chronic neck pain  M54.2     G89.29    4. Decreased range of motion of trunk and back  M25.60        Goal Status:  See status in note below.    Patient was seen for 4 visits from 12/26/19 to 2/13/20 with 0 missed appointments.    Pt was able to perform in PT HEP for core and trunk strengthening but was continued to be limited by intermittent, episodic flares of low back pain.  The patient was instructed to follow up with physician's clinic.    Therapy will be discontinued at this time.  The patient will need a new referral to resume.    Thank you for your referral.  Sakina Natarajan PT, DPT, OCS, CLT  4/9/2020  2:32 PM      [facility] Daily Progress     Patient Name: Leny Jeffrey  Date: 2/13/2020  Visit #: 4/12  Referring Provider: Megan Liu MD  Referring Diagnosis:   Chronic left-sided low back pain without sciatica [M54.5, G89.29]           Visit Diagnosis:     ICD-10-CM    1. Chronic left-sided low back pain without sciatica M54.5     G89.29    2. Generalized muscle weakness M62.81    3. Chronic neck pain M54.2     G89.29    4. Decreased range of motion of trunk and back M25.60        Assessment:     Pt demo's increased lumbar sensitivity with extension and with transition forward flex to neutral.  Pt demo's sx similar to spondylolisthesis.    HEP/POC compliance is  good .  Patient demonstrates understanding/independence with home program.  Patient has had limited response to treatment thus far. Consider modifying plan of care as appropriate.    Goal Status: on going  Pt. will demonstrate/verbalize independence in self-management of condition in : 12 weeks  Pt. will be independent with home exercise program in :  12 weeks;Comment  Comment:: for aerobic and strength training programming for improved physical condition with minimal pain and difficulty  Pt. will have improved quality of sleep: with less pain;waking less times/night;in 12 weeks  Patient will stand : 10 minutes;with less pain;with less difficultty;for home chores;for work;in 12 weeks;Comment  Comment:: pain <3/10  Patient will sit: 30 minutes;for driving;for work;for eating;for watching TV;with less pain;with less difficultty;in 12 weeks;Comment  Comment: pain <3/10    Patient will decrease : NIKKIE score;by _ points;for improved quality of function;for improved quality of life;in 12 weeks  by ___ points: 6      Plan / Patient Education:     Pt to return to primary MD for further medical assessment and possible intervention.  Pt to continue with strengthening with HEP as able without intensifying her pain sx.    Continue with initial plan of care.   Pt to return to PT if able when ready to progress HEP.    Attempt counter push ups, reassess trunk ROM and strength and add moni chair (in pt's garage) if doing well.      Subjective:     Pain Ratin/10 - chronic level, when pt gets a trigger of increased pain then it is 9/10  Pt has been back to HEP regularly and a walking program and she doesn't feel like she has had much change to low back pain sx.  Pt reports neck has been doing okay - mild soreness but not limited her ADLs.  Pt can do neck strengthening with mild soreness afterwards but not significant increase to pain.    Pt reports she has been changing the way she bends forward to avoid bending because of pain - using her knees more or bending sideways because bending straight forward and then standing back up straight bothers her back a lot.    Objective:     Trunk flex WNL with L side low back around the 30-45 degree bend dilip going down and back up to neutral (was WNL but painful initially), ext WNL with pain L of center low back (was WNL but painful  initially), B SB WNL without pain    Non tender lumbar paraspinals, mild hypo lumbar spine today    Positive PIT test with decreased jt sensitivity with active muscle contraction    Tender with P/A pressures L3-S1 and at L SIJ and hypermobile    FROM LAST VISITS  CCFT - pt able to perform chin tuck x10 seconds for 4-10 mmHg change - unable to hold x10 seconds at 12 mmHg change    Treatment Today      TREATMENT MINUTES COMMENTS   Evaluation     Self-care/ Home management     Manual therapy 4 Reassessed palpation lumbar spine.   Neuromuscular Re-education     Therapeutic Activity     Therapeutic Exercises 38 Reassessed lumbar ROM and testing and discussed results.  Encouraged pt to return to MD for further medical assessment as she has been able to perform PT HEP but has had minimal change to pain sx   Gait training     Modality__________________                Total 42    Blank areas are intentional and mean the treatment did not include these items.       Sakina Natarajan PT, DPT, OCS, CLT  2/13/2020  9:16 AM

## 2021-06-06 NOTE — PROGRESS NOTES
SUBJECTIVE: Here for curbside evaluation for coronaviruse referred through [facility].     Reports no new symptoms.     OBJECTIVE: no apparent distress  Eyes appear normal  Mucous membranes moist  Non diaphoretic.   No increased work of breathing   Mental status appears normal/affect normal     1. Cough  COVID-19 Virus PCR-NP    over the counter meds and isolation discussed until results in.   Education information given. Time of visit was 15 minutes more than half in coordination of care and counseling regarding COVID and self-isolation

## 2021-06-06 NOTE — TELEPHONE ENCOUNTER
Refill Approved    Rx renewed per Medication Renewal Policy. Medication was last renewed on 12/17/19.    Jing Oconnell, [facility] Triage/Med Refill 3/4/2020     Requested Prescriptions   Pending Prescriptions Disp Refills     SUMAtriptan (IMITREX) 50 MG tablet 10 tablet 0     Sig: Take 1 tablet (50 mg total) by mouth every 2 (two) hours as needed for migraine.       Triptans Refill Protocol Passed - 3/3/2020  9:09 AM        Passed - PCP or prescribing provider visit in past 12 months       Last office visit with prescriber/PCP: 2/18/2020 Megan Liu MD OR same dept: 2/18/2020 Megan Liu MD OR same specialty: 2/18/2020 Megan Liu MD  Last physical: 12/17/2019 Last MTM visit: Visit date not found   Next visit within 3 mo: Visit date not found  Next physical within 3 mo: Visit date not found  Prescriber OR PCP: Megan Liu MD  Last diagnosis associated with med order: 1. Migraine with aura and without status migrainosus, not intractable  - SUMAtriptan (IMITREX) 50 MG tablet; Take 1 tablet (50 mg total) by mouth every 2 (two) hours as needed for migraine.  Dispense: 10 tablet; Refill: 0    If protocol passes may refill for 12 months if within 3 months of last provider visit (or a total of 15 months).

## 2021-06-06 NOTE — TELEPHONE ENCOUNTER
Left message to call back for: Patient  Information to relay to patient:  Please relay MD's message to pt when they call back in.    BR, ANDRY

## 2021-06-06 NOTE — TELEPHONE ENCOUNTER
Patient Returning Call  Reason for call:  Prescription refill for  Tizanidine.   Information relayed to patient:  Megan Liu MD's message.   Patient has additional questions:  Yes  If YES, what are your questions/concerns:  Is there other information for the patient?   Okay to leave a detailed message?: No call back needed  Patient states she has already received the message noted below. Is there another message the patient should receive?

## 2021-06-07 NOTE — PROGRESS NOTES
"Phone visit    Ongoing cough    Leny called the triage nurses earlier today and was urged to make a phone visit to discuss her symptoms. Here are notes from triage:    Pt reports \"cough for 3 weeks.\"  Had online virtual visit March 15th for cough.  Was given inhaler and Flonase.  Negative COVID19 test result on 3/15/2020.     Cough has remained most problematic symptom.  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  Not productive.  \"Talking causes coughing.\"  Also feels like I can never quite get a really deep breath.\"     No suspicion of COVID19 -> pt states \"have not left the house at all since negative screening results of 3/15/2020.\"     Pt wonders if chest x-ray is needed ...  Agrees to schedule appt for telephone provider visit to determine next best steps ...  Warm transferred to a  for this purpose now.     Matilde Santiago RN  [facility] Triage    --  Leny says her cough started .  before she left for [place] March 7.  She did and E-visit on 3/15 , was diagnosed with a upper respiratory infection and was prescribed  - albuterol inhaler, fluticasone.  Neither of these helped. She  a covid test at the drive through site.    Her cough is not going away - she coughs a lot when she talks. She has no fever, she is a little short of breath, and breathing in kind of hurts.  She can't breath in deeply without coughing      --  Back pain   I had recently called her about her MRI and she discussed the results with a knowledgeable friend who works [facility]. She says her back pain flare is better, though she is annoyed that she has a constant, low grade pain all  day every day pain    ---  Assessment and Plan    1. Acute bronchitis, unspecified organism  symptoms not improved with albuterol and Flonase.  Will treat presumptively to treat bronchitis/walking pneumonia, but would consider ordering a CXR if she contacts me " to say that symptoms have not improved.   - azithromycin (ZITHROMAX Z-ANITA) 250 MG tablet; Take 2 tablets (500 mg) on  Day 1,  followed by 1 tablet (250 mg) once daily on Days 2 through 5.  Dispense: 6 tablet; Refill: 0  - benzonatate (TESSALON) 200 MG capsule; Take 1 capsule (200 mg total) by mouth 3 (three) times a day as needed for cough.  Dispense: 30 capsule; Refill: 0      Return in about 9 months (around 12/24/2020) for Annual physical.        I spent 20 minutes talking with this patient for the phone visit

## 2021-06-07 NOTE — TELEPHONE ENCOUNTER
"Pt reports \"cough for 3 weeks.\"  Had online virtual visit March 15th for cough.  Was given inhaler and Flonase.  Negative COVID19 test result on 3/15/2020.    Cough has remained most problematic symptom.  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  Not productive.  \"Talking causes coughing.\"  Also feels like I can never quite get a really deep breath.\"    No suspicion of COVID19 -> pt states \"have not left the house at all since negative screening results of 3/15/2020.\"    Pt wonders if chest x-ray is needed ...  Agrees to schedule appt for telephone provider visit to determine next best steps ...  Warm transferred to a  for this purpose now.    Matilde Santiago RN  [facility] Triage    Reason for Disposition    Continuous (nonstop) coughing interferes with work or school and no improvement using cough treatment per Care Advice    Protocols used: COUGH-A-OH      "

## 2021-06-07 NOTE — TELEPHONE ENCOUNTER
Coronavirus (COVID-19) Notification    Reason for call  Notify of Negative COVID-19 lab result, assess symptoms,  review [facility] recommendations    Lab Result    Lab test 2019-nCoV rRt-PCR  Oropharyngeal AND/OR nasopharyngeal swabs were NEGATIVE for 2019-nCoV RNA      RN Recommendations/Instructions per [facility]  Patient notified of Negative COVID-19.    Patient can discontinue Quarantee and is free to resume normal activites.  If Patient has questions that you are not able to answer they can contact PCP or MD hotline ([phone])    Please Contact your PCP clinic or return to the Emergency department if your:    Symptoms worsen or other concerning symptom's.      {Name]  Tammie F Severson, LPN

## 2021-06-08 NOTE — TELEPHONE ENCOUNTER
Review of chart and patient request for ultrasound to confirm placement, plan was made to have pt monitor and call if desires follow up ultrasound. Ultrasound ordered for her to schedule.

## 2021-06-08 NOTE — TELEPHONE ENCOUNTER
"Patient has a future lab appointment on 6/11/20 for \"per PCP\". There are no lab orders. Could you please review the patient's chart and place orders?        Thanks    "

## 2021-06-08 NOTE — TELEPHONE ENCOUNTER
I do not have any labs to order for found this from Rheumatology at [facility] 6/9/20    Plan:  1. Recheck blood counts, creatinine, liver function, inflammatory markers, and urine for signs of visceral involvement by inflammatory or autoimmune disease.    I called Leny - she thinks she may have something on her [facility] mychart about what needs to be ordered.  She will also try to get a formal electronic letter from her doctor.    IF THIS IS NOT AVAILABLE - then I approve the following labs:     - comprehensive metabolic panel   - CBC   - MICHELLE   - CRP   - UA    - CCP    Diagnosis: diffuse arthralgia - M25.50

## 2021-06-08 NOTE — PROGRESS NOTES
"Subjective:      Leny Jeffrey is a 39 y.o. female presents to clinic today with complains of irregular menstrual spotting for the last 2 months. States that she went for a run in March and then had bright red spotting that lasted for one day.  Then patient had brownish discharge for 2 days but only when wiping.  Denies the need for a pantyliner. Since March patient states she has had regular menses every month, every 25-28 days and bleeding lasts for 7 days and is often heavy on the first day with her menstrual cup overflowing. Denies any concerns for STD. States last intercourse was in January with a trusted individual.  Reports no concerns for STD and declines all STD testing today. Denies any vaginal odor, vaginal discharge or odor.  Patient states she would like to \"make sure\" that everything is alright with her IUD. Reports that she is currently under increased stress in her life but did not elaborate further. Paragard was placed 18. CT of abdomen and pelvis completed 19 and revealed IUD in correct position.      Menstrual History:  OB History        2    Para   2    Term   2            AB        Living   2       SAB        TAB        Ectopic        Multiple        Live Births   2                LMP 2020, normal menses       The following portions of the patient's history were reviewed and updated as appropriate: allergies, current medications, past medical history and problem list.    Review of Systems  A 12 point comprehensive review of systems was negative except as noted.      Objective:     Vitals:    05/15/20 0845   BP: 110/66   Pulse: 76          General appearance: alert, appears stated age and cooperative  Pelvic: cervix normal in appearance, external genitalia normal, no adnexal masses or tenderness, rectovaginal septum normal and vagina normal without discharge IUD strings visualized and 3-4 cm in length       Assessment:        1. Vaginal spotting          Plan: "     -Discussed option for STD testing, patient declined  -Discussed option for pelvic ultrasound for IUD placement, patient declined  -Reviewed causes of irregular menstrual spotting, including increased stress levels  -Continue to monitor spotting, if increases or patient would like to have ultrasound will call for order  -Next pap smear due 2024  -RTO prn  -Call with questions/concerns      Total time spent with patient 20 minutes, >50% counseling, education and coordination of care.    Vee Seay, APRN,CNM

## 2021-06-12 NOTE — PROGRESS NOTES
"Assessment and Plan    1. Encounter for general adult medical examination with abnormal findings    2. Arthralgia  - Erythrocyte Sedimentation Rate  - C-Reactive Protein  - Lyme Antibody Cascade    3. Fatigue  - Erythrocyte Sedimentation Rate  - C-Reactive Protein  - Thyroid Stimulating Hormone (TSH)    4. Chest wall pain  Sounds musculoskeletal  - XR Chest PA and Lateral - I see no reason for pain - await radiology reading to confirm    Counseling:  Preventive maintenance  Exercise  Family planning  Breast self exam  - NOT \"looking for lumps\" but getting to know what is your normal and being aware of changes  preventive medications and triptans for headaches        Megan Liu MD    -----------------  Leny used to see the Midwives here at [facility], but she wanted to establish with a primary care provider for several concerns    Leny gets a headache daily.  She has spurts a month at a time where it's daily  And then it goes away for a while -  usually ibuprofen takes care of it.  Exercise helps.  Feels like eye is ready to pop out. She has looked this up and it seems like cluster headache - her pattern hasn;t changed, and she had no neurological deficits with the headache.  Takes ibuprofen about once a week.   Morning stiffness - in hands - can't squeeze well in am - this has been going on a few years.  No flu-like syndrome preceding onset - this came on subtly.  She denies dry eyes or mouth    She has had some left sided posterior chest wall pain.  She had not illness preceding this and does not remember any injury              Do you have any concerns today?    Habits  Exercise: runs 3 days a week, lift weight 3 days a week  Diet: pescatarain  Do you take any herbs or supplements that were not prescribed by a doctor? yes Vitamin D 5000 IU weekly  Are you taking calcium supplements? no milk, protein shake, cheese  Are you taking aspirin daily? no  Do you wear seat belts? yes  Do you bike or ride a " motorcycle? Do you wear a helmet? Doesn't wear helmet  Abuse screen: negative      Feels not depressed with exercise  History   Smoking Status     Never Smoker   Smokeless Tobacco     Never Used     History   Alcohol Use     Yes     Comment: Wine a couple nights a week.       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  She has two children 14(girl)  and 8 (boy) - has ear tubes.  She is a single Mom  Lives in [place]     History:  LMP: Patient's last menstrual period was 2017.  Last pap date:16  Abnormal pap?yes  : 2  Para: 2  Are you sexually active? no  Do you need to use family planning?  IUD 9 years ago  Last sti screen      Preventive  BP Readings from Last 3 Encounters:   17 108/82   07/15/16 108/66   05/18/15 116/70     Immunization History   Administered Date(s) Administered     DT (pediatric) 2006     HPV Quadrivalent 10/20/2010, 03/10/2011     Hep A, historic 2007, 2007     Influenza, Seasonal, Inj PF 10/20/2010, 2012     Influenza, inj, historic 2007, 2013     Td, historic 2006     Tdap 2012     Lab Results   Component Value Date    HGB 14.6 2010     Lab Results   Component Value Date    CHOL 164 07/15/2016     Lab Results   Component Value Date    HDL 45 (L) 07/15/2016     Lab Results   Component Value Date    LDLCALC 104 07/15/2016     Lab Results   Component Value Date    TRIG 77 07/15/2016         Patient Active Problem List   Diagnosis     History of abnormal Pap smear     H/O abnormal Pap smear     PMDD (premenstrual dysphoric disorder)         Current Outpatient Prescriptions:      cholecalciferol, vitamin D3, (VITAMIN D3) 5,000 unit Tab, Take 1 tablet by mouth. Three times weekly, Disp: , Rfl:      COPPER (PARAGARD T 380A UTRN), , Disp: , Rfl:               Review of Systems    Do you have pain that bothers you in your daily life? no    Constitutional: negative for weight change or recent  illness; fatigue - she thinks probably due to working single mother status, but not sure  Eyes: negative for change in vision  Ears, nose, mouth, throat, and face: negative for sore throat and nasal drainage  Respiratory: negative for cough or dyspnea  Cardiovascular: negative for chest pain and palpitations; notes Resting heart rate in upper 40s  Gastrointestinal: negative for abdominal pain and change in bowel habits  Genitourinary:negative for dysuria  Breast: negative for lumps or pains  Integument: no rashes or lesions  Musculoskeletal:negative for myalgias; hand stiffness as noted above; also manageable low back pain  Neurological: negative for dizziness and paresthesia; cluster type headache symptoms as noted above  Behavioral/Psych: negative for depression; she does get depressed before menses  - Manages it OK - tried seasonale for a month - bled for a month straight.  Has though about fluoxetine - she will see how things go this winter - might consider fluoxetine        Objective:     Vitals:    07/21/17 1025   BP: 108/82   Pulse: 60   Resp: 20     Body mass index is 28.27 kg/(m^2).     General appearance: alert, appears stated age, cooperative and overweight  Head: Normocephalic, without obvious abnormality, atraumatic  Eyes: conjunctivae/corneas clear. PERRL, EOM's intact. Fundi benign.  Ears: normal TM's and external ear canals both ears  Throat: lips, mucosa, and tongue normal; teeth and gums normal and oropharynx clear  Neck: no adenopathy, no carotid bruit, no JVD, supple, symmetrical, trachea midline and thyroid not enlarged, symmetric, no tenderness/mass/nodules  Lungs: clear to auscultation bilaterally   Abdominal exam: soft, nontender, nondistended, no masses or organomegaly.  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 Blood vessels on en

## 2021-06-13 NOTE — PROGRESS NOTES
"Leny Jeffrey is a 40 y.o. female who is being evaluated via a billable telephone visit.      The patient has been notified of following:     \"This telephone visit will be conducted via a call between you and your physician/provider. We have found that certain health care needs can be provided without the need for a physical exam.  This service lets us provide the care you need with a short phone conversation.  If a prescription is necessary we can send it directly to your pharmacy.  If lab work is needed we can place an order for that and you can then stop by our lab to have the test done at a later time.    Telephone visits are billed at different rates depending on your insurance coverage. During this emergency period, for some insurers they may be billed the same as an in-person visit.  Please reach out to your insurance provider with any questions.    If during the course of the call the physician/provider feels a telephone visit is not appropriate, you will not be charged for this service.\"    Patient has given verbal consent to a Telephone visit? Yes    What phone number would you like to be contacted at? [phone]    Patient would like to receive their AVS by AVS Preference: Yany.    Additional provider notes:   CC: The patient is being seen as a consult from Idania Guajardo CNM, secondary to a desire for no more pregnancies.    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  Initially the visit was a video visit, but she couldn't hear me, so we changed to a phone visit.  She had a ParaGard that she removed herself in Aug.  She noted that her periods got a little lighter after that, but they are still heavy.  She didn't notice any other changes with it out.  She would like to avoid hormones or internal devices.    Past Medical History:   Diagnosis Date     Abnormal Pap smear of cervix     Multiple, LEEP 2014; wnl 2016       Past Surgical History: "   Procedure Laterality Date     CERVICAL BIOPSY  W/ LOOP ELECTRODE EXCISION  2014    CIN2, Path report showed margins were not clear but pap smear 6 months later was normal     [facility] REPAIR OF NASAL SEPTUM      Description: Septoplasty;  Recorded: 03/26/2008;     WISDOM TOOTH EXTRACTION  2000       Patient's   Family History   Problem Relation Age of Onset     No Medical Problems Mother      Mental illness Father      Alcohol abuse Father      No Medical Problems Sister      Colon cancer Maternal Grandmother 60     Cancer Maternal Grandfather      Heart disease Paternal Grandmother      Heart attack Paternal Grandfather      No Medical Problems Son      No Medical Problems Daughter        Patient   Social History     Socioeconomic History     Marital status:      Spouse name: Not on file     Number of children: 2     Years of education: Not on file     Highest education level: Not on file   Occupational History     Occupation: RN   Social Needs     Financial resource strain: Not on file     Food insecurity     Worry: Not on file     Inability: Not on file     Transportation needs     Medical: Not on file     Non-medical: Not on file   Tobacco Use     Smoking status: Never Smoker     Smokeless tobacco: Never Used   Substance and Sexual Activity     Alcohol use: Yes     Alcohol/week: 0.0 - 2.0 standard drinks     Comment: Wine a couple nights a week.     Drug use: No     Sexual activity: Never     Partners: Male     Birth control/protection: I.U.D.   Lifestyle     Physical activity     Days per week: Not on file     Minutes per session: Not on file     Stress: Not on file   Relationships     Social connections     Talks on phone: Not on file     Gets together: Not on file     Attends Confucianist service: Not on file     Active member of club or organization: Not on file     Attends meetings of clubs or organizations: Not on file     Relationship status: Not on file     Intimate partner violence     Fear of current  or ex partner: Not on file     Emotionally abused: Not on file     Physically abused: Not on file     Forced sexual activity: Not on file   Other Topics Concern     Not on file   Social History Narrative     Not on file       Outpatient Medications Prior to Visit   Medication Sig Dispense Refill     ascorbic acid, vitamin C, (VITAMIN C) 500 MG tablet Take 250 mg by mouth.       aspirin-acetaminophen-caffeine (EXCEDRIN MIGRAINE) 250-250-65 mg per tablet Take 1 tablet by mouth every 6 (six) hours as needed for pain.       buPROPion (WELLBUTRIN XL) 300 MG 24 hr tablet Take 1 tablet (300 mg total) by mouth every morning. 90 tablet 0     calcium carbonate/vitamin D3 (CALCIUM 600 + D,3, ORAL) Take by mouth daily.       eletriptan (RELPAX) 20 MG tablet Take 20-40 mg by mouth.       ferrous sulfate 325 (65 FE) MG tablet Take 1 tablet by mouth.       magnesium 250 mg Tab Take 1 tablet by mouth daily.       ondansetron (ZOFRAN-ODT) 4 MG disintegrating tablet        prochlorperazine (COMPAZINE) 5 MG tablet        rizatriptan (MAXALT-MLT) 5 MG disintegrating tablet Take 5-10 mg by mouth.       SUMAtriptan (IMITREX) 50 MG tablet Take 1 tablet (50 mg total) by mouth every 2 (two) hours as needed for migraine. 10 tablet 3     tiZANidine (ZANAFLEX) 4 MG tablet Take 2 tablets (8 mg total) by mouth 2 (two) times a day. 60 tablet 1     topiramate (TOPAMAX) 50 MG tablet Take 1 tablet (50 mg total) by mouth 2 (two) times a day.       triamcinolone (KENALOG) 0.1 % ointment        benzonatate (TESSALON) 200 MG capsule Take 1 capsule (200 mg total) by mouth 3 (three) times a day as needed for cough. 30 capsule 0     copper (PARAGARD T 380A) 380 square mm IUD IUD 1 each by Intrauterine route once.       ferrous sulfate (IRON ORAL) Take 1 tablet by mouth With Vitamin c .       No facility-administered medications prior to visit.        Patient has No Known Allergies.    ROS:  12 part ROS is negative aside from those symptoms in the  HPI    PE:  There were no vitals taken for this visit.          There is no height or weight on file to calculate BMI.    General: WF, NAD    Assessment: 40 y.o. DWF  with a desire for permanent contraception.    Plan: Laparoscopic tubal ligation was discussed with the patient.  We discussed that most of the time the surgery is actually a bilateral salpingectomy to help reduce the risk of ovarian cancer.  She was counseled on the permanence of the procedure, the approximately 1/200 failure rate and the increased risk for ectopic should pregnancy occur.  She was counseled on the pros and cons of LTL including immediate effect but general anesthesia and 3 incisions.  Questions were answered.  She will be notified when the procedure is scheduled.  She was counseled on the need for someone to drive her home and stay with her after surgery, the need for a pre-op exam prior to the surgery, the need to have nothing to eat or drink after midnight on the day of surgery, and that the surgery center would call her a day or two before the procedure to go over details of the process and what time to arrive.    Phone call duration: 19 minutes    Addendum: Surgery scheduled Jan 22, 2021 at 9 am at [facility].

## 2021-06-14 NOTE — PROGRESS NOTES
Assessment:      40 y.o., routine Kyleena IUD insertion.     IUD in place    Plan:     1.  Reviewed, witnessed and signed IUD insertion consent form and reviewed alternative options for BCM available. Patient desires IUD insertion today.   2. Reassurance re: correct placement, normalcy of side effects and that these often lessen with extended use of IUD.   3.  Recommended taking ibuprofen 600 mg q 6hours x 5 days to reduce amount and duration of bleeding.    4.  Taught and encouraged to check IUD strings monthly.    5.  Warning signs related to IUD use (PAINS) and when to call CNM reviewed  including to call with any fever, severe back pain, severe abdominal pain, heavy bleeding (soaking more than 1 pad per hour)         Subjective:      Leny Jeffrey is a 40 y.o. female who presents for IUD insertion. The patient has no complaints today. The patient is sexually active.     Reports no unprotected intercourse in the last two weeks.     Urine pregnancy test was performed in clinic and was negative. Pt education included mechanism of action of Kyleena IUD. The risks and benefits of the procedure were explained as below to the patient and informed consent was obtained and consent form signed.   Leny was given an opportunity to ask questions about all forms of birth control, meaning all prescriptions, non-prescription, and natural methods. All of her questions were answered to her satisfaction and she understood all of those answers. She understands that no method of birth control, except abstinence, is 100% effective against pregnancy or naomy sexually transmitted diseases, including Human Immunodeficiency Virus (HIV) infection that leads to the Acquired Immunodeficiency Syndrome (AIDS) disease. The following benefits, risk/side effects, warning signs, control method, intrauterine decisions to discontinue use option, regarding the birth control method, intrauterine device, were explained to her before she  voluntarily decided to use this method of birth control.   BENEFITS: The IUD is 97-99% effective if all the directions regarding its use are followed carfeully. It can be more effective if used with foam or condoms mid-cycle between periods. IUDs containing progestin may decrease menstrual flow and painful menstrual periods. The IUD provides longer protection from pregnancy (Paragard- 10 years; Mirena - 5 years)   RISKS/ SIDE EFFECTS   1. Spotting, bleeding, hemorrhage or anemia   2. Cramping or pain   3. Partial or complete expulsion of device leading to pregnancy, the pregnancy ending in miscarriage   4. Lost IUD string or other string problems   5. Puncturing of the uterus, embedding, or cervical perforation   6. Increased risk of pelvic inflammatory disease   WARNING SIGNS: The patient was advised to call the clinic if she has any of the following early warning signs:   P - Period late (pregnancy), abnormal spotting or bleeding   A - Abdominal pain, pain with intercourse   I - Infection exposure (such as gonorrhea), abnormal discharge   N - Not feeling well, fever, chills   S - String missing, shorter or longer   ALTERNATIVES: She received written information about other methods of birth control and she chose the IUD. She understands that she should check for the IUD strings several times during the first few months after insertion and then after each monthly period or before intercourse.   DECISION TO DISCONTINUE USE: She understands that she may have the IUD removed at any time. She knows not try to remove the device and that it should be removed only by a medical provider. If she does not desire to become pregnant, she has been told she may request to have another IUD inserted or choose to use another method of birth control. If she wishes to become pregnant, she understands most women not using birth control become pregnant within 12 months.    Review of Systems  A 12 point comprehensive review of systems  was negative except as noted.     Objective:     Vitals:    01/29/21 1405   BP: 100/70   Pulse: 64       IUD Insertion Procedure Note    Pre-operative Diagnosis: undesired fertility, menstrual flow management    Post-operative Diagnosis: IUD in place, stable    Indications: Contraception    Procedure Details   Sterile speculum placed. Low risk for STI, offered and declined GC/CT, not collected. Cervix cleansed with Betadine. Uterus sounded to 7.5 cm. IUD inserted without difficulty. String visible and trimmed to 3 cm. Minimal bleeding noted. Patient tolerated procedure well. Did not have any periods of syncope, sat up and ambulated with ease.     IUD Information:  Expiration date Sept 2022, Kyleena.  Lot # [id number]    Condition:  Stable    Complications:  None        Total time with patient 40 mn >50% time spent in counseling or coordination of care.    Evi Chawla, DNP, APRN, CNM

## 2021-06-14 NOTE — PROGRESS NOTES
FEMALE PREVENTATIVE EXAM    Assessment and Plan:     Patient has been advised of split billing requirements and indicates understanding: Yes    1. Routine general medical examination at a health care facility    2. Bilateral back pain, unspecified back location, unspecified chronicity  TRIAL OF  - gabapentin (NEURONTIN) 100 MG capsule; Take 100-300 mg by mouth at bedtime.  Dispense: 30 capsule; Refill: 0    3. PMDD (premenstrual dysphoric disorder)  Well managed with  - buPROPion (WELLBUTRIN XL) 300 MG 24 hr tablet; Take 1 tablet (300 mg total) by mouth every morning.  Dispense: 90 tablet; Refill: 3    4. Visit for screening mammogram  - Mammo Screening Bilateral; Future    5. Need for hepatitis C screening test  - Hepatitis C Antibody (Anti-HCV)        Next follow up:  Return in about 1 year (around 1/7/2022) for Annual physical.    Immunization Review  Adult Imm Review: No immunizations due today      I discussed the following with the patient:       Breast cancer screening - American Cancer Society now recommends screening starting at age 40 - this is not the current recommendation from [employer] [place] or from [facility].  Her family breast cancer history is one second degree relative. Discussed advantages (small possibility of finding cancer) vs. Disadvantages (faster growing cancers in younger women are more of a chance detection, false positives).  She will think about is and call her insurance.    Medications available for urge incontinence - she does NOT want to take any more medications as present    Megan Liu MD      Subjective:   Chief Complaint: Leny Jeffrey is an 40 y.o. female here for a preventative health visit.    Patient has been advised of split billing requirements and indicates understanding: Yes  HPI:      Leny is wondering whether she should have a mammogram. Her Moms' sister had breast findings, not cancer. Great maternal grand-mother had breast cancer. Discussed early screening pros and  "cons      premenstrual dysphoric disorder.  Duloxetine was  causing sexual aside effects at only 30 mg -we switched to bupropion which she still find helpful  She notes that the elevated PHQ9 has more to do with her physical health and chronic pain  Little interest or pleasure in doing things: Nearly every day  Feeling down, depressed, or hopeless: Nearly every day  Trouble falling or staying asleep, or sleeping too much: Several days  Feeling tired or having little energy: More than half the days  Poor appetite or overeating: Not at all  Feeling bad about yourself - or that you are a failure or have let yourself or your family down: Not at all  Trouble concentrating on things, such as reading the newspaper or watching television: More than half the days  Moving or speaking so slowly that other people could have noticed. Or the opposite - being so fidgety or restless that you have been moving around a lot more than usual: Not at all  Thoughts that you would be better off dead, or of hurting yourself in some way: Not at all  PHQ-9 Total Score: 11      Urge incontinence - last month she \"peed in pants\" three times.. Did get a warning, but felt it was too short. Most of the time she can make it    Pain is not a 10 it's just a constant       Migraines - followed by neurology.  \"my eyes hurt all the time.\"  Tapered off Topamax - not different with off two months.  Has headaches 10 days a month - 1-2x month debilitating.  Has sumatriptan but feels as if those do not work. Excedrin migraine works best. Botox didn't help after one try  - she realizes this usually takes several injections, but it is not covered by her insurance and is too expensive,     Rizatriptan doesn't works as well as sumatriptan, but doens't make her has ask    Arthritis/chronic pain - saw rheumatologistDr. Rene Trev[facility] at [facility] in June and is she is planning to see him again. She has declined hydroxychlorquine so far. At one point he was concerned " about ankylosing spondylitis. Her hips and low back hurt so much she cannot move at all in the morning.  Ice doesn't help.  Nothing helps. Daughter had left over hydrocodone and tried one and it didn't help.  Has not tried gabapentin or pregabalin.  cortisone injections in facet joints haven't worked. Epidural made it worse.  Last virtual visit from 6/9/20 pasted at the end of this note for reference    Took  IUD out several months ago - not difference in pain - may get it put back in.  Thinking about tubal libation.  No interested in Essure.  May try Mirena.  Paraguard was hard on her back    Has a little lump at the right back of neck - no irritation in ears or scalp      Social History:She is working at [email or web address] in  [place], [place].  She has children ages 12 and 18. Her 18 year old daughter was doing all on-lien at [employer].  She works as a horse stable    Healthy Habits  Are you taking a daily aspirin? No  Do you typically exercising at least 40 min, 3-4 times per week?  NO - due to pain  Do you usually eat at least 4 servings of fruit and vegetables a day, include whole grains and fiber and avoid regularly eating high fat foods? Yes - she is interested in functional medicine  Have you had an eye exam in the past two years? NO  Do you see a dentist twice per year? Yes  Do you have any concerns regarding sleep? No - she is able to sleep but wakes up with pain    Safety Screen  If you own firearms, are they secured in a locked gun cabinet or with trigger locks? The patient does not own any firearms  Do you feel you are safe where you are living?: Yes (1/7/2021 12:56 PM)  Do you feel you are safe in your relationship(s)?: Yes (1/7/2021 12:56 PM)      Referral to Dr. Her  Review of Systems:    Constitutional: negative for recent illness or change in weight  Eyes: negative for irritation and vision change  Ears, nose, mouth, throat, and face: negative for nasal congestion and sore  "throat  Respiratory: negative for cough and dyspnea on exertion  Cardiovascular: negative for chest pain and palpitations  Genitourinary:negative for dysuria, frequency and hesitancy  Hematologic/lymphatic: negative for bleeding and easy bruising      Cancer Screening       Status Date      PAP SMEAR Next Due 8/22/2024      Done 8/22/2019 GYNECOLOGIC CYTOLOGY (PAP SMEAR)     Patient has more history with this topic...          History     Reviewed By Date/Time Sections Reviewed    Bernie Caballero CMA 1/7/2021 12:56 PM Tobacco            Objective:   Vital Signs:   Visit Vitals  /60   Pulse 84   Ht 5' 4\" (1.626 m)   Wt 168 lb (76.2 kg)   SpO2 100%   BMI 28.84 kg/m           PHYSICAL EXAM  General appearance - alert, well appearing, and in no distress  Mental status - somewhat  Mood linked to frustration with chronic pain; normal behavior, speech, dress, motor activity, and thought processes  Eyes - pupils equal and reactive, extraocular eye movements intact, funduscopic exam normal, discs flat and sharp  Ears - bilateral TM's and external ear canals normal  Mouth - mucous membranes moist, pharynx normal without lesions  Neck - supple, no significant adenopathy - pea sized right posterior lymph node noted, carotids upstroke normal bilaterally, no bruits, thyroid exam: thyroid is normal in size without nodules or tenderness  Chest - clear to auscultation, no wheezes, rales or rhonchi, symmetric air entry  Heart - normal rate, regular rhythm, normal S1, S2, no murmurs, rubs, clicks or gallops  Abdomen - soft, nontender, nondistended, no masses or organomegaly  Breasts - breasts appear normal, no suspicious masses, no skin or nipple changes or axillary nodes  Neurological - alert, oriented, normal speech, no focal findings or movement disorder noted, DTR's normal and symmetric  Extremities - peripheral pulses normal, no pedal edema, no clubbing or cyanosis; Plantar fibroma left foot  Skin - no rashes or worrisome " lesions    ---  Rheumatology visit 6/9/20 with Dr. Rene Trevino, [facility]    Assessment and Plan:  # Diffuse joint pain, fatigue, headaches, facial rashes, positive MICHELLE:   Patient relates persistent diffuse joint aches, daytime fatigue, dysphoria, but improved intermittent facial rashes (after stopping caffeine). Video exam shows no swelling in visible hand joints, and is otherwise unremarkable. Labs from 06/28/2019 show that kidney function, liver function, CBC, CRP, RF and CCP were negative or normal. Urine was clear. MICHELLE was low positive at 1:160. Double stranded DNA and JAMES panel were negative. X-ray of the pelvis from 06/28/2019 showed excellent preservation of joint margins with no evidence of erosion.    Although patient has documented low positive MICHELLE, the clinical picture does not add up to a formal diagnosis of systemic lupus or other autoimmune disorder that would benefit from immunomodulatory therapy. The lack of improvement in joint pain following a trial course of low dose prednisone in Spring 2019 argued against a significant inflammatory component of the patient's ongoing pain and fatigue. Given diffuse. musculoskeletal symptoms and positive MICHELLE, the patient remains at slightly increased risk of developing autoimmunity. I recommend screening visit with rheumatology every 6-12 months to review symptoms and check CBC, creatinine, and urinalysis.    2. Chronic low back pain, worsening. No inflammatory or neuropathic features. Possibly related to stenosis. Agree with physical therapy, and daily flexion extension exercises for likely muscle pain generators. Referral to nonoperative sports medicine for evaluation.  3. Chronic headaches, migrainous  4. Medial epicondylitis: bilateral, not likely related to systemic rheumatic disease.     Plan:  1. Recheck blood counts, creatinine, liver function, inflammatory markers, and urine for signs of visceral involvement by inflammatory or autoimmune disease.  2.  Continue regular physical exercise, get good quality sleep, utilize ibuprofen 400 to 600 mg up to 3 times daily as needed for joint discomfort; I see that the patient is using Wellbutrin for mood disorder. If it has not already been tried through primary care, I recommend a trial of duloxetine for both analgesic and mood altering properties.  3. Visit with nonoperative sports orthopedics to discuss therapeutic approaches to recently documented lumbar stenosis.  4. Continue follow-up with neurology for headache management.  5. Monitor for new or worsening symptoms in the skin, chest, abdomen.  6. Use heat pack to inside of both elbows twice daily for 15 minutes. Consider whether sleep positioning could exacerbate tension on the tendon attachments at the elbow.    Follow-up: 6 mos

## 2021-06-14 NOTE — PATIENT INSTRUCTIONS - HE
WARNING SIGNS: Call the clinic with any of the following early warning signs:   P - Period late (pregnancy), abnormal spotting or bleeding   A - Abdominal pain, pain with intercourse   I - Infection exposure (such as gonorrhea), abnormal discharge   N - Not feeling well, fever, chills   S - String missing, shorter or longer     Here is a great resource to learn more about your IUD! This webpage has instructions on how to feel for your strings (scroll to the bottom): https://www.bedsider.org/methods/iud#how_to   We always encourage a visit back in clinic at 4-6 weeks after placement so that we can view the IUD strings with an exam and confirm that the IUD is still in place as expected by measuring the length of the strings. Ongoing after that visit, many women are able to perform a self exam or a partner is able to feel the strings. The key is to feel for the cervix! Good luck and we're happy to teach this skill at your visit.   In the meantime, if you can't feel your strings, you may elect to observe for signs of expelling the device (partner has pain with intercourse by abrasion with the plastic end that should be in the uterus) or visually seeing the strings on the outside of your body (they are short enough to always be within your vagina). It's fine to use a back up method of pregnancy prevention, such as condoms, until your clinic visit.   Good luck navigating the website and information and we look forward to seeing you again in clinic.  [place],   Evi Chawla, DNP, APRN, CNM

## 2021-06-15 ENCOUNTER — OFFICE VISIT - HEALTHEAST (OUTPATIENT)
Dept: FAMILY MEDICINE | Facility: CLINIC | Age: 41
End: 2021-06-15

## 2021-06-15 DIAGNOSIS — M54.9 BILATERAL BACK PAIN, UNSPECIFIED BACK LOCATION, UNSPECIFIED CHRONICITY: ICD-10-CM

## 2021-06-15 DIAGNOSIS — G44.89 OTHER HEADACHE SYNDROME: ICD-10-CM

## 2021-06-15 DIAGNOSIS — R63.5 WEIGHT GAIN: ICD-10-CM

## 2021-06-16 NOTE — PATIENT INSTRUCTIONS - HE
1) Schedule lab only visit    2) Here is a good reference which provide a functional medicine approach to autoimmune disease: The Immune System Recovery Plan by Dr. Janelle Russ    3) Consider additional functional medicine testing:    The ALCAT test for food sensitivity is a blood test that looks at the reaction of white blood cells to a number of foods.  The average panel that looks at 150 foods is priced at $300 which is payable by check/ credit card when you have the test done.  You can go to [email or web address] to learn more.    JetPay GI Stool Effects test to evaluate digestion/ microbiome.    JetPay Metabolomix test to evaluate functional imbalances/ individualized nutrient needs.    Go to gdx.[email or web address] to lear more about these tests.    4) Let me know if you would like to do any of these functional medicine tests - the ALCAT can be done with your blood draw and I can send kits to your home for the other tests.    5) I will make recommendations for supplements / lifestyle changes after reviewing test results.

## 2021-06-16 NOTE — TELEPHONE ENCOUNTER
Telephone Encounter by Bibiana Andrade at 2/24/2020 11:50 AM     Author: Bibiana Andrade Service: -- Author Type: --    Filed: 2/24/2020 11:51 AM Encounter Date: 2/20/2020 Status: Signed    : Bibiana Andrade       PRIOR AUTHORIZATION DENIED    Denial Rational: must try/fail baclofen.     Please advise Tizanidine tablets are covered.     Tizanidine capsules were prescribed but are not covered.            Appeal Information: Patient needs to initiate the appeals process per insurance plan.  [facility] cannot initiate appeal.  Patient will need to call the number on the back of their insurance card to inquire about initiation process. They will need to sign an authorization form giving permission for the clinic to represent them in the appeal. Once that is completed insurance will reach out to [facility] team if more information is needed.

## 2021-06-16 NOTE — PROGRESS NOTES
Leny was seen today for initial functional medicine consult.    Diagnoses and all orders for this visit:    Bilateral back pain, unspecified back location, unspecified chronicity  -     C -Reactive Protein, High Sensitivity; Future  -     Celiac(Gluten)Antibody Panel; Future    Other headache syndrome  -     Magnesium, Red Blood Cell; Future  -     Riboflavin (Vitamin B2), Plasma; Future  -     Vitamin B6 (Pyridoxal 5-Phosphate); Future    Chronic fatigue  -     Vitamin B12; Future  -     Zinc, Serum or Plasma; Future  -     T3 (Triiodothyronine), Free; Future  -     Triiodothyronine (T3), Reverse; Future  -     T4, Free; Future  -     Thyroglobulin Antibody; Future  -     Thyroid Peroxidase Antibody; Future  -     Thyroid Stimulating Hormone (TSH); Future    Vitamin D deficiency  -     Vitamin D, Total (25-Hydroxy); Future    Check labs and will make recommendations for optimal vitamin D/ fish oil / MVI / magnesium.  Consider elimination diet / further evaluation of digestion / probiotics.    Patient Instructions   1) Schedule lab only visit    2) Here is a good reference which provide a functional medicine approach to autoimmune disease: The Immune System Recovery Plan by Dr. Janelle Russ    3) Consider additional functional medicine testing:    The ALCAT test for food sensitivity is a blood test that looks at the reaction of white blood cells to a number of foods.  The average panel that looks at 150 foods is priced at $300 which is payable by check/ credit card when you have the test done.  You can go to [email or web address] to learn more.    Naty GI Stool Effects test to evaluate digestion/ microbiome.    Naty Metabolomix test to evaluate functional imbalances/ individualized nutrient needs.    Go to [email or web address] to lear more about these tests.    4) Let me know if you would like to do any of these functional medicine tests - the ALCAT can be done with your blood draw and I can send kits to your home for the  other tests.    5) I will make recommendations for supplements / lifestyle changes after reviewing test results.        SUBJECTIVE: Leny Jeffrey is a 40 y.o. female who presents for a functional medicine consult with the following concerns:    1) low back pain - She has elevated MICHELLE.  No firm diagnosis- possible lupus? She has been seeing rheumatologist for last few years. Back is big issue now - tried some injections.  Hips are starting to hurt as well.  Also has some pain in hands/ feels weak and tired.  General achiness. Nothing has seemed to help right now.  Last few weeks little better with exercise / weight loss.  Moving in bed is hard.    2) Fatigue - She was tired in her 20s.  Was single mother of 2 children in her 30s.  Lot of stress at that time.  Was checked for mono/ Lyme's.  Functioning ok but mood has been down.    3) Chronic headaches - Sees neurology.  Diagnosed as migraines.  Has been on Topamax. Weaned off Topamax but got more headaches so went back on.  Had some Botox injections as well.  Has chronic neck pain.  Took out IUD but no response so now had Kyleena IUD.    4) Rash on face / neck - Unclear if due to SLE.  Seemed improved eliminating coffee.  Flared up when she was in sun.    TIMELINE:    Antecedents ( Preconception / prenatal ): Smoker.  [place]  Triggers:  Birth - .  35 weeks.  Bottle fed.    Early childhood- no concerns. Father committed suicide age 3.  Adolescence - no issues  Young adulthood -  age 22 / 28.   age 30.  Single mom and in school.    Patient Active Problem List   Diagnosis     Back pain     History of abnormal cervical Pap smear     Other headache syndrome     IUD (intrauterine device) in place     Encounter for other contraceptive management        Current Outpatient Medications:      ascorbic acid, vitamin C, (VITAMIN C) 500 MG tablet, Take 250 mg by mouth every other day. , Disp: , Rfl:      aspirin-acetaminophen-caffeine (EXCEDRIN MIGRAINE)  250-250-65 mg per tablet, Take 1 tablet by mouth every 6 (six) hours as needed for pain., Disp: , Rfl:      buPROPion (WELLBUTRIN XL) 300 MG 24 hr tablet, Take 1 tablet (300 mg total) by mouth every morning., Disp: 90 tablet, Rfl: 3     calcium carbonate/vitamin D3 (CALCIUM 600 + D,3, ORAL), Take by mouth daily. 800 Vit D, Disp: , Rfl:      cholecalciferol, vitamin D3, (VITAMIN D3) 50 mcg (2,000 unit) Tab, Take 2,000 Units by mouth daily., Disp: , Rfl:      ferrous sulfate 325 (65 FE) MG tablet, Take 1 tablet by mouth every other day. , Disp: , Rfl:      levonorgestreL (KYLEENA) 17.5 mcg/24 hrs (5 yrs) 19.5 mg IUD IUD, by Intrauterine route once. Kyleena IUD inserted by Evi Chawla CNM on [employer] Lot [id number] Exp Sep 2022 NDC 03114-041-80, Disp: , Rfl:      magnesium 250 mg Tab, Take 2 tablets by mouth daily. , Disp: , Rfl:      ondansetron (ZOFRAN-ODT) 4 MG disintegrating tablet, as needed. , Disp: , Rfl:      rizatriptan (MAXALT-MLT) 5 MG disintegrating tablet, Take 5-10 mg by mouth as needed. , Disp: , Rfl:      SUMAtriptan (IMITREX) 50 MG tablet, Take 1 tablet (50 mg total) by mouth every 2 (two) hours as needed for migraine., Disp: 10 tablet, Rfl: 3     topiramate (TOPAMAX) 25 MG tablet, 100 mg daily. , Disp: , Rfl:       Mediators/ Perpetuators:    SH: Household- Lives with 2 children age 12 and 18.  Dog/ 2 cats.  Ex- takes kids on some weekends.  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    Sleep - Sleeps well.  Gets 7 hours / night.    Movement - works out on treadmill / weight exercises- interval  Nutrition - B- plant based protein shake with almond milk/ banana.  L- protein oatmeal / soup.  D- baked veggies/ fish.  Pescatarian.  Caffeine - green or black tea.  Alcohol - rare  Stressors - financial / lack of time / world situation  Spirituality - no. Connection to earth.  Mind-body- occasional yoga  Social connection - family/ friends    Past  "treatments:  Medical- meds/ injections with [facility] / PT  Complementary - chiro / acupuncture for headaches    Past testing:  [place]- food sensitivity- reacted to coffee - skin improved off coffee.  Slightly elevated cortisol.      Supplements: magnesium at bedtime / iron / vitamin C / vitamin D/ calcium  Tried turmeric /riboflavin      MSQ: 56  Perceived Stress Scale: 13    FH: father's side- porpheria      OBJECTIVE: /68 (Patient Site: Left Arm, Patient Position: Sitting, Cuff Size: Adult Regular)   Pulse 90   Ht 5' 4\" (1.626 m)   Wt 166 lb (75.3 kg)   SpO2 100%   BMI 28.49 kg/m   no distress  GENERAL: Healthy, alert and no distress  EYES: Eyes grossly normal to inspection. No discharge or erythema, or obvious scleral/conjunctival abnormalities.  RESP: No audible wheeze, cough, or visible cyanosis.  No visible retractions or increased work of breathing.    NEURO: Cranial nerves grossly intact. Mentation and speech appropriate for age.  PSYCH: Mentation appears normal, affect normal/bright, judgement and insight intact, normal speech and appearance well-groomed    LAB:   Vitamin D, Total (25-Hydroxy)   Date Value Ref Range Status   07/15/2016 24.3 (L) 30.0 - 80.0 ng/mL Final      Lab Results   Component Value Date    TSH 1.75 12/17/2019     Lab Results   Component Value Date    WBC 7.3 06/11/2020    HGB 15.1 06/11/2020    HCT 44.4 06/11/2020    MCV 90 06/11/2020     06/11/2020      Results for orders placed or performed in visit on 12/14/18   Comprehensive Metabolic Panel   Result Value Ref Range    Sodium 139 136 - 145 mmol/L    Potassium 4.0 3.5 - 5.0 mmol/L    Chloride 103 98 - 107 mmol/L    CO2 28 22 - 31 mmol/L    Anion Gap, Calculation 8 5 - 18 mmol/L    Glucose 88 70 - 125 mg/dL    BUN 11 8 - 22 mg/dL    Creatinine 0.78 0.60 - 1.10 mg/dL    GFR MDRD Af Amer >60 >60 mL/min/1.73m2    GFR MDRD Non Af Amer >60 >60 mL/min/1.73m2    Bilirubin, Total 0.7 0.0 - 1.0 mg/dL    Calcium 9.9 8.5 " - 10.5 mg/dL    Protein, Total 7.0 6.0 - 8.0 g/dL    Albumin 4.1 3.5 - 5.0 g/dL    Alkaline Phosphatase 59 45 - 120 U/L    AST 19 0 - 40 U/L    ALT 17 0 - 45 U/L         Total time spent- 51 minutes face to face / reviewing past labs / discussing functional medicine approach / plan for future/ documentation.    Carmela Her

## 2021-06-17 NOTE — PATIENT INSTRUCTIONS - HE
"Patient Instructions by Sakina Natarajan PT at 12/26/2019  3:30 PM     Author: Sakina Natarajan PT Service: -- Author Type: Physical Therapist    Filed: 12/26/2019  5:08 PM Encounter Date: 12/26/2019 Status: Signed    : Sakina Natarajan PT (Physical Therapist)       Aerobic exercise - recommendation for up to 150 minutes   Walking 2-3x/week walking 10-30 minutes - can mess around with speed for increased heart rate rise     BRIDGING    While lying on your back, tighten your lower abdominals, squeeze your buttocks and then raise your buttocks off the floor/bed as creating a \"Bridge\" with your body.  x5 seconds x10 reps - can work up to 15 reps  1-2x/day      PELVIC CLOCK    - Imagine clock on pelvis with 12 at your head and 6 at feet and 3 at left hip and 9 at right hip  - Start by pushing your lower back into the mat to find 12 and then tailbone into mat to find 6  - Alternate between 6 and 12, holding for 2-3 seconds in each position x5-10 reps.    THEN try 3-9 x5-10 reps  THEN CW and CCW x5-10 reps   1-2x/day         Engage abs and flatten back - toe tap x10-15 reps alternating legs - attempt 2-3 rounds 1-2x/day                 "

## 2021-06-17 NOTE — PATIENT INSTRUCTIONS - HE
"Patient Instructions by Sakina Natarajan PT at 1/16/2020  9:00 AM     Author: Sakina Natarajan PT Service: -- Author Type: Physical Therapist    Filed: 1/16/2020  5:50 PM Encounter Date: 1/16/2020 Status: Addendum    : Sakina Natarajan PT (Physical Therapist)    Related Notes: Original Note by Sakina Natarajan PT (Physical Therapist) filed at 1/16/2020  9:37 AM       Aerobic exercise - recommendation for up to 150 minutes   Walking 2-3x/week walking 10-30 minutes - can mess around with speed for increased heart rate rise     BRIDGING    While lying on your back, tighten your lower abdominals, squeeze your buttocks and then raise your buttocks off the floor/bed as creating a \"Bridge\" with your body.  x5 seconds x10 reps - can work up to 15 reps  1-2x/day      PELVIC CLOCK    - Imagine clock on pelvis with 12 at your head and 6 at feet and 3 at left hip and 9 at right hip  - Start by pushing your lower back into the mat to find 12 and then tailbone into mat to find 6  - Alternate between 6 and 12, holding for 2-3 seconds in each position x5-10 reps.    THEN try 3-9 x5-10 reps  THEN CW and CCW x5-10 reps   1-2x/day         Engage abs and flatten back - toe tap x10-15 reps alternating legs - attempt 2-3 rounds 1-2x/day     CHIN TUCK - SUPINE    While lying on your back, tuck your chin towards your chest and press the back of your head into the table.    Maintain contact of head with the surface you are lying on the entire time.  Use pillow to support curve of neck.  Hold x10 seconds x10 reps - work up to 20 reps  1-2x/day    RETRACTION / CHIN TUCK    Slowly draw your head back so that your ears line up with your shoulders.  OPTION to do here too       Kicking back to straight - x10-20 reps hold 2-3 seconds 1-2 sets  1-2x/day                       "

## 2021-06-17 NOTE — PROGRESS NOTES
Assessment:      vaginal discharge changes.    Inability to locate IUD strings     Plan:      Discussed different causes of vaginitis.  Reviewed yeast and BV symptoms as well as STI risk assessment.  Because she is currently treating with vaginal antifungal treatment, discussed that test may be unreadable or reveal unreliable results due to medication interference.  Wet prep was negative, GC/CT pending.  Offered the following options to Leny: she could return to clinic in 24 hours (and not use any further vaginal cream treatment) to have discharge retested; or she may complete the 3 day treatment course and return to clinic if symptoms remain unresolved for a repeat wet prep.  Will follow up with results of GC/CT testing when available.  Discussed vaginal hygiene and how to avoid different vaginitis symptoms. Encouraged tub soaks BID and cotton, breathable underwear, as well as sleeping without underwear to allow for restoration of normal vaginal christian.  Avoid douches, soaps or any skin irritant. Continue with condom use PRN for STI protection.  For IUD management, discussed continued inability to locate strings (both provider and patient), and reviewed 2014 US which reveals IUD properly positioned.  IUD needs removal and replacement by 10/2018.  Encouraged Leny to schedule removal and replacement with Dr. Kauffman who is better equipped to remove IUD where strings are unable to be located.  Referral placed for her visit and patient will schedule this visit by 10/2018.  Encouraged Leny to call with any further questions or concerns.    TT with pt 20 min, >50% TSC and CC    Subjective:       Leny Jeffrey is a 37 y.o. female who presents for evaluation of an abnormal vaginal discharge. Symptoms have been present for 6 days. Initially she noted mild itching, then it progressed to severe itching with a mild odor.  Current Vaginal symptoms: dyspareunia, local irritation and odor. She suspected she had a yeast infection,  "so she used OTC Miconazole 3 day treatment.  She did not note any resolution in her symptoms, so yesterday she started another 3 day course of anitfungal intravaginal cream.  She last inserted the cream early this AM.  Contraception: condoms and IUD. She denies abnormal bleeding, blisters, bumps and post coital bleeding Sexually transmitted infection risk: very low risk of STD exposure., she is currently in a monogamous relationship with her new partner of 3 months. She has not had recent STI testing.  She declines STI blood work testing, but consents to wet prep and GC/CT swab.   Menstrual flow: regular every 28-30 days.    The following portions of the patient's history were reviewed and updated as appropriate: allergies, current medications, problem list and problem list    Review of Systems  General: Denies general constitutional problems  Eyes: Denies problems  Ears/Nose/Throat: Denies problems  Cardiovascular: Denies problems  Respiratory: Denies problems  Gastrointestinal: Denies problems  Genitourinary: Denies problems  Musculoskeletal: Denies problems  Skin: Denies problems  Neurologic: Denies problems  Psychiatric: Denies problems  Endocrine: Denies problems  Heme/Lymphatic: Denies problems  Allergic/Immunologic: Denies problems      Objective:         Vitals:    04/30/18 1020   BP: 96/70   Pulse: 64   Weight: 166 lb (75.3 kg)   Height: 5' 4.25\" (1.632 m)     Physical Exam:  General Appearance: Alert, cooperative, no distress, appears stated age  Head: Normocephalic, without obvious abnormality, atraumatic  Abdomen: Soft, non-tender, bowel sounds active all four quadrants,  no masses, no organomegaly  Pelvic:Normally developed genitalia with no external lesions or eruptions. No external excoriation or erythema noted.  Vaginal rugae is pink and glistening, vaginal walls without adherent discharge. Posterior fornix reveals copious thick, white oily substance (suspect medication from vaginal cream).  Vagina " and cervix show no lesions, inflammation, discharge or tenderness. No cystocele, No rectocele. Uterus anteverted.  No adnexal mass or tenderness.  IUD strings not visible at introitus, cytobrush used to attempt exposure of strings without success.  Bimanual exam unable to palpate any strings in cervical os.  Extremities: Extremities normal, atraumatic, no cyanosis or edema  Skin: Skin color, texture, turgor normal, no rashes or lesions  Lymph nodes: Cervical, supraclavicular, and axillary nodes normal  Neurologic: Normal         Labs:  Negative wet prep  GC/CT pending

## 2021-06-17 NOTE — TELEPHONE ENCOUNTER
Left message to call back for: pt  Information to relay to patient: Pt's test result are back and Dr. Her would like pt to schedule f/u visit. Please help schedule appointment for pt. A copy of the result send to pt today. xl

## 2021-06-18 NOTE — PROGRESS NOTES
Patient wants to hold off on the neurology referral. She wants to try the prescription that was given her first. If she does not improve she will call back to schedule with neurology.

## 2021-06-18 NOTE — PROGRESS NOTES
Assessment and Plan    1. Premenstrual dysphoric disorder  Controlled on sertraline, but she would like to try increased dose  - sertraline (ZOLOFT) 50 MG tablet; Take 1.5 tablets (75 mg total) by mouth daily.  Dispense: 135 tablet; Refill: 1    2. Headache  Sounds migrainous, comes a few weeks at a time then goes, NOT typical of cluster headache  - nortriptyline (PAMELOR) 10 MG capsule; Take 1 capsule (10 mg total) by mouth at bedtime.  Dispense: 30 capsule; Refill: 0  - SUMAtriptan (IMITREX) 50 MG tablet; Take 1 tablet (50 mg total) by mouth once as needed for migraine.  Dispense: 10 tablet; Refill: 2  - Ambulatory referral to Neurology    OK to refer to podiatry for fibroma on foot    Return in about 6 months (around 11/18/2018), or sooner if symptoms do not improve    Megan Liu MD     -------------------------------------------    Chief Complaint   Patient presents with     Medication Management     mainly for depression/mood.      Headache     wanted to follow up, still having headaches. Thinks it's cluster headaches, taking Ibuprofen/tylenol/excedrin but doesn't fully go away.  Gets them daily.       I last saw Leny about 10 months ago for a well check.  She comes today for a refill of her Sertraline, prescribed elsewhere in   - October 2017 for premenstrual dysphoric disorder.  She found it beneficial and used it for  3-4 months - then exercised daily and felt better.  She tried discontinuing it on her own, but then restarted 2 months ago. Although 50 mg is helpful for her symptoms  - which she notes are  worse in the winter - would like to try 75 mg daily; she is a single Mom - everything is stressful. Works as a nurse - in eating disorder treatment.    Little interest or pleasure in doing things: Several days  Feeling down, depressed, or hopeless: Several days  Trouble falling or staying asleep, or sleeping too much: Not at all  Feeling tired or having little energy: More than half the days  Poor  "appetite or overeating: Not at all  Feeling bad about yourself - or that you are a failure or have let yourself or your family down: Not at all  Trouble concentrating on things, such as reading the newspaper or watching television: Nearly every day  Moving or speaking so slowly that other people could have noticed. Or the opposite - being so fidgety or restless that you have been moving around a lot more than usual: Not at all  Thoughts that you would be better off dead, or of hurting yourself in some way: Not at all  PHQ-9 Total Score: 7  If you checked off any problems, how difficult have these problems made it for you to do your work, take care of things at home, or get along with other people?: Somewhat difficult    Also concerned about headaches - has always had them she has been young.  These started in the last year and a half, but in the last  2 weeks she has  headaches every day.  This is her pattern - has weeks where she will get them randomly a few times during the day and then not again for several weeks.  She was taking ibuprofen \"really frequently\": 4 days a week - 400 mg/day.  Tried Excedrin yesterday.  She does not have any sort of  \"preview\" /aura prior to onset of headache. Has more pain in eyes.  Pain is in back top of head and it affects her mood and her ability to focus.  \"Memory is  not great.\" She gets  sensitive to light and noise (more so light) and a little nauseated.  She has gotten to the point where she feels like she would throw up.  No visual changes or numbness in face.  She went in th have vision checked last year.      Sleep - gets 6-8 hours a night - on average 7 .     Other follow up ;    At her last visit with me, Leny noted rib pain ; we did and xray which was normal.  She still has pain  - left lateral lower rib for over a year - but has learned to live with it.    She also noted multiple myalgias and arthralgias at the time - work up negative.  Declines referral to " rheumatologist            Patient Active Problem List   Diagnosis     History of abnormal Pap smear     H/O abnormal Pap smear     PMDD (premenstrual dysphoric disorder)         Current Outpatient Prescriptions:      calcium carbonate/vitamin D3 (CALCIUM 600 + D,3, ORAL), Take by mouth daily., Disp: , Rfl:      cholecalciferol, vitamin D3, (VITAMIN D3) 5,000 unit Tab, Take 1 tablet by mouth. Twice weekly, Disp: , Rfl:      COPPER (PARAGARD T 380A UTRN), Due for replacement October 2018, Disp: , Rfl:      sertraline (ZOLOFT) 50 MG tablet, Take 1.5 tablets (75 mg total) by mouth daily., Disp: 135 tablet, Rfl: 1     nortriptyline (PAMELOR) 10 MG capsule, Take 1 capsule (10 mg total) by mouth at bedtime., Disp: 30 capsule, Rfl: 0     SUMAtriptan (IMITREX) 50 MG tablet, Take 1 tablet (50 mg total) by mouth once as needed for migraine., Disp: 10 tablet, Rfl: 2        There are no preventive care reminders to display for this patient.      History   Smoking Status     Never Smoker   Smokeless Tobacco     Never Used       History   Alcohol Use     Yes     Comment: Wine a couple nights a week.         Vitals:    05/18/18 1002   BP: 108/72   Pulse: 64   SpO2: 98%     Body mass index is 28.27 kg/(m^2).     EXAM:    General appearance - alert, well appearing, and in no distress  Mental status - alert, oriented to person, place, and time, normal mood, behavior, speech, dress, motor activity, and thought processes  Neurological - funduscopic exam normal, discs flat and sharp, DTR's normal and symmetric, Romberg sign negative, normal gait and station, finger to nose normal, heel walk and toe walk normal  Extremities - Plantar  Fibroma left foot

## 2021-06-20 NOTE — LETTER
Letter by Megan Liu MD at      Author: Megan Liu MD Service: -- Author Type: --    Filed:  Encounter Date: 12/17/2019 Status: Signed                    My Depression Action Plan  Name: Leny Jeffrey   Date of Birth 1980  Date: 12/17/2019    My Doctor: Megan Liu MD   My Clinic: [facility]  [address] [address]  [phone]          GREEN    ZONE   Good Control    What it looks like:     Things are going generally well. You have normal ups and downs. You may even feel depressed from time to time, but bad moods usually last less than a day.   What you need to do:  1. Continue to care for yourself (see self care plan)  2. Check your depression survival kit and update it as needed  3. Follow your physicians recommendations including any medication.  4. Do not stop taking medication unless you consult with your physician first.           YELLOW         ZONE Getting Worse    What it looks like:     Depression is starting to interfere with your life.     It may be hard to get out of bed; you may be starting to isolate yourself from others.    Symptoms of depression are starting to last most all day and this has happened for several days.     You may have suicidal thoughts but they are not constant.   What you need to do:     1. Call your care team, your response to treatment will improve if you keep your care team informed of your progress. Yellow periods are signs an adjustment may need to be made.     2. Continue your self-care, even if you have to fake it!    3. Talk to someone in your support network    4. Open up your depression survival kit           RED    ZONE Medical Alert - Get Help    What it looks like:     Depression is seriously interfering with your life.     You may experience these or other symptoms: You cant get out of bed most days, cant work or engage in other necessary activities, you have trouble taking care of basic hygiene, or basic  responsibilities, thoughts of suicide or death that will not go away, self-injurious behavior.     What you need to do:  1. Call your care team and request a same-day appointment. If they are not available (weekends or after hours) call your local crisis line, emergency room or 911.            Depression Self Care Plan / Survival Kit    Self-Care for Depression  Heres the deal. Your body and mind are really not as separate as most people think.  What you do and think affects how you feel and how you feel influences what you do and think. This means if you do things that people who feel good do, it will help you feel better.  Sometimes this is all it takes.  There is also a place for medication and therapy depending on how severe your depression is, so be sure to consult with your medical provider and/ or Behavioral Health Consultant if your symptoms are worsening or not improving.     In order to better manage my stress, I will:    Exercise  Get some form of exercise, every day. This will help reduce pain and release endorphins, the feel good chemicals in your brain. This is almost as good as taking antidepressants!  This is not the same as joining a gym and then never going! (they count on that by the way?) It can be as simple as just going for a walk or doing some gardening, anything that will get you moving.      Hygiene   Maintain good hygiene (Get out of bed in the morning, Make your bed, Brush your teeth, Take a shower, and Get dressed like you were going to work, even if you are unemployed).  If your clothes don't fit try to get ones that do.    Diet  I will strive to eat foods that are good for me, drink plenty of water, and avoid excessive sugar, caffeine, alcohol, and other mood-altering substances.  Some foods that are helpful in depression are: complex carbohydrates, B vitamins, flaxseed, fish or fish oil, fresh fruits and vegetables.    Psychotherapy  I agree to participate in Individual Therapy (if  recommended).    Medication  If prescribed medications, I agree to take them.  Missing doses can result in serious side effects.  I understand that drinking alcohol, or other illicit drug use, may cause potential side effects.  I will not stop my medication abruptly without first discussing it with my provider.    Staying Connected With Others  I will stay in touch with my friends, family members, and my primary care provider/team.    Use your imagination  Be creative.  We all have a creative side; it doesnt matter if its oil painting, sand castles, or mud pies! This will also kick up the endorphins.    Witness Beauty  (AKA stop and smell the roses) Take a look outside, even in mid-winter. Notice colors, textures. Watch the squirrels and birds.     Service to others  Be of service to others.  There is always someone else in need.  By helping others we can get out of ourselves and remember the really important things.  This also provides opportunities for practicing all the other parts of the program.    Humor  Laugh and be silly!  Adjust your TV habits for less news and crime-drama and more comedy.    Control your stress  Try breathing deep, massage therapy, biofeedback, and meditation. Find time to relax each day.     My support system    Clinic Contact:  Phone number:    Contact 1:  Phone number:    Contact 2:  Phone number:    [employer]/:  Phone number:    Therapist:  Phone number:    [facility] crisis center:    Phone number:    Other community support:  Phone number:

## 2021-06-22 NOTE — PROGRESS NOTES
FEMALE PREVENTATIVE EXAM    Assessment and Plan:       1. Encounter for general adult medical examination with abnormal findings    2. Epigastric mass  Round, firm, tender, grape sized - evaluate with  - US Abdomen Limited; Future    3. Weight gain  No change in diet or exercise  - Thyroid Stimulating Hormone (TSH)  - T4, Free    4. Easy bruising  In the last 6 months or so  - HM1(CBC and Differential)  - Platelet Function Test  - INR  - APTT(PTT)  - HM1 (CBC with Diff)  - Platelet Function Confirmation    5. Fatigue, unspecified type  Rule out anemia, auto-immune (she also has aches) , thyroid disease  - HM1(CBC and Differential)  - Comprehensive Metabolic Panel  - Thyroid Stimulating Hormone (TSH)  - T4, Free  - HM1 (CBC with Diff)  - C-Reactive Protein    Follow-up to be determined pending results of labs    Immunization Review  Adult Imm Review: Due today, orders placed  influenza      Subjective:   Chief Complaint: Leny Jeffrey is an 38 y.o. female here for a preventative health visit.     HPI: Leny comes for an annual exam.  I started by asking her about issues from our last visit in April    She continues to have headaches mainly in the back/top of her head , and or the last month has had no relief.  Headaches to not wake her up, but she often wakes up with a headache.  She has also had more nausea and light sensitivity - headlights and tail lights have made her nauseated.  She DID see a neurologist  - Dr. Garcia at [facility]  - who felt after reviewing his history and symptoms that her headaches were due to muscle tensions and started her on tizanidine, however she has not filled this.  Still, chiropractic and acupuncture have not helped.  Dr. Garcia also wondered about restless legs - she had low ferritin - takes iron daily - helps with restless legs.Gets enough sleep - gets 7-9 hours. No weakness or tinging, no hot of cold spots on skin, no joint hypermobility    Pre-menstrual dysphoric disorder -- she  had stopped taking sertraline - she did not feel it was really helping and now does not feel any different since stopping different    Easy bruising - a new concern - starting this summer.. She tends to bruise easily; recent lab  Draw resulted in huge bruises   Gets heavy menses.  Has ParaGard. No rashes - she did have a dermatology check.  She also has fatigue, general aches, which she has just gotten used to, as well as pain over left lower posterior rib.  She has gained weight    Wt Readings from Last 3 Encounters:   12/14/18 178 lb (80.7 kg)   12/04/18 179 lb 14.4 oz (81.6 kg)   05/18/18 166 lb (75.3 kg)         She notes a lump in her abdomen just below xyphoid, that is tender.  Maybe has been there a few months?    Social: she works as a nurse at the [employer]. Has two children 16 girls and 10     Healthy Habits  Are you taking a daily aspirin? No  Do you typically exercising at least 40 min, 3-4 times per week?  NO, usually does - just tired - energy level, not dyspnea  Wt Readings from Last 3 Encounters:   12/14/18 178 lb (80.7 kg)   12/04/18 179 lb 14.4 oz (81.6 kg)   05/18/18 166 lb (75.3 kg)       Do you usually eat at least 4 servings of fruit and vegetables a day, include whole grains and fiber and avoid regularly eating high fat foods? Yes  Have you had an eye exam in the past two years? Yes  Do you see a dentist twice per year? Yes  Do you have any concerns regarding sleep? No  Safety Screen  If you own firearms, are they secured in a locked gun cabinet or with trigger locks? The patient does not own any firearms  Do you feel you are safe where you are living?: Yes (4/30/2018 10:20 AM)  Do you feel you are safe in your relationship(s)?: Yes (4/30/2018 10:20 AM)      Review of Systems: see HPI - all other ROS unremarkable      Cancer Screening       Status Date      PAP SMEAR Next Due 7/15/2019      Done 7/15/2016 GYNECOLOGIC CYTOLOGY (PAP SMEAR)     Patient has more history with this topic...     "          Objective:   Vital Signs:   Visit Vitals  BP 98/70 (Patient Site: Left Arm, Patient Position: Sitting, Cuff Size: Adult Regular)   Pulse 66   Ht 5' 4\" (1.626 m)   Wt 178 lb (80.7 kg)   LMP 11/30/2018   SpO2 99%   BMI 30.55 kg/m           PHYSICAL EXAM  General appearance - alert, well appearing, and in no distress  Mental status - normal mood, behavior, speech, dress, motor activity, and thought processes  Eyes - pupils equal and reactive, extraocular eye movements intact, funduscopic exam normal, discs flat and sharp  Ears - bilateral TM's and external ear canals normal  Mouth - mucous membranes moist, pharynx normal without lesions  Neck - supple, no significant adenopathy, carotids upstroke normal bilaterally, no bruits, thyroid exam: thyroid is normal in size without nodules or tenderness  Chest - clear to auscultation, no wheezes, rales or rhonchi, symmetric air entry  Heart - normal rate, regular rhythm, normal S1, S2, no murmurs, rubs, clicks or gallops  Abdomen - soft, nontender, nondistended, no organomegaly; grape sized round,  firm, tender below xyphoid  Breasts - breasts appear normal, no suspicious masses, no skin or nipple changes or axillary nodes  Neurological - DTR's normal and symmetric  Extremities - peripheral pulses normal, no pedal edema, no clubbing or cyanosis  Skin - several bruising current on right leg - in green/healing state, size of baseball      "

## 2021-06-22 NOTE — TELEPHONE ENCOUNTER
US ABDOMEN LIMITED  12/15/2018 7:51 AM     INDICATION: Lump just below sternal notch  COMPARISON: None.     FINDINGS:  GALLBLADDER: Normal, without cholelithiasis.  BILE DUCTS: No bile duct dilation. The common bile duct measures mm.  LIVER: Normal where seen.  RIGHT KIDNEY: No hydronephrosis.  PANCREAS: Not imaged in detail on this limited study. No incidental abnormalities.     No ascites in the right upper quadrant.     Ultrasound abdominal wall over the substernal notch region shows no definable abnormality. No masses. No hernias.     IMPRESSION:   CONCLUSION:  1.  Normal right upper quadrant ultrasound.     2.  No abnormality to correspond to the palpable lump in the substernal region.

## 2021-06-22 NOTE — PROGRESS NOTES
"IUD Removal and Insertion Procedure Note    Subjective: The patient is here for a ParaGard removal and insertion of a new one.  The strings are not present.  She has had an ultrasound in the past showing intrauterine placement.       Objective:   /80   Pulse 60   Ht 5' 4.25\" (1.632 m)   Wt 179 lb 14.4 oz (81.6 kg)   LMP 11/30/2018   Body mass index is 30.64 kg/m .    Urine pregnancy test was not done.    Procedure Details   The risks (including infection, bleeding, pain, and uterine perforation, expulsion and failure to prevent pregancy, uterine perforation) and benefits of the procedure were explained to the patient and informed consent was obtained.      A speculum was placed, and the cervix and upper vagina were prepped with Betadine.  No strings were present; long Cynthia clamp was used to explore the endocervical canal.  After several attempts the strings were grasped and the IUD was removed without difficulty. The anterior lip of the cervix was grasped with a single-tooth tenaculum.  Uterus was sounded to a depth of 8.5 cm, and the IUD was placed without difficulty in the uterine fundus.  Strings were trimmed to 3 cm in length.  Speculum and tenaculum were removed. The patient tolerated the procedure well.  Lot # [id number], exp date Apr 2024.    Assessment  Successful IUD removal and insertion.    Plan  She is given instructions for checking her IUD string and is encouraged to call for any concerns.         "

## 2021-06-23 ENCOUNTER — MYC MEDICAL ADVICE (OUTPATIENT)
Dept: RHEUMATOLOGY | Facility: CLINIC | Age: 41
End: 2021-06-23

## 2021-06-25 NOTE — TELEPHONE ENCOUNTER
Left message to call back for: Leny  Information to relay to patient:  Please notify patient of message below and help schedule. Thank you.

## 2021-06-26 NOTE — PATIENT INSTRUCTIONS - HE
To schedule a complementary 30 minute consult for the ALCAT test go to: [employer]/resultsreview  To download a guide for the ALCAT results go to: [employer]/guide    Here are recommendations to decrease inflammation:    1) Reduce inflammatory foods in diet: red meat/ fast food/ fried foods/ sweets / processed foods    2) Consider elimination diet to evaluate for food sensitivities ( common food groups include gluten/ dairy / corn )    3) Maintain healthy vitamin D levels    4) Add anti-inflammatory herbs/ spices to meals - reid/ garlic / basil / parsley / rosemary/  fischer / thyme/  oregano / cinnamon/ cloves / mint / chili pepper    5) Increase intake of omega-3 fatty acids - Take a high quality supplement and add following foods to diet: fish / ground flax seeds / rhett seeds/ hemp seeds / sesame seeds / soybeans / walnuts / brussel sprouts    6) Eat colorful fruits / vegetables    7) Cardiovascular activity 4 or more days / week    8) Get adequate sleep    9) Avoid toxin exposure    10) Consider supplementation with natural products: turmeric/ skullcap root/ green tea extract/ quercetin / vitamin D / bromelain    Consider supplement through [employer]:    To order supplements go to the web site: [employer]  Use my authorization number to order the recommended supplements: S99     Advanced Inflammation Support- Take 2 two times a day     Follow-up with lab work in 2 months-

## 2021-06-26 NOTE — PROGRESS NOTES
Leny Jeffrey is a 40 y.o. female who is being evaluated via a billable video visit.      How would you like to obtain your AVS? MyChart.  If dropped from the video visit, the video invitation should be resent by: Send to e-mail at: [email or web address]  Will anyone else be joining your video visit? No    Video Start Time: 5:09 pm    Assessment & Plan     Leny was seen today for test results.    Diagnoses and all orders for this visit:    Other headache syndrome- I recommend she supplement with magnesium / multivitamins.    Weight gain    Bilateral back pain, unspecified back location, unspecified chronicity    I recommend she pursue elimination diet based on the ALCAT results.    Patient Instructions   To schedule a complementary 30 minute consult for the ALCAT test go to: [email or web address]  To download a guide for the ALCAT results go to: [employer]/guide    Here are recommendations to decrease inflammation:    1) Reduce inflammatory foods in diet: red meat/ fast food/ fried foods/ sweets / processed foods    2) Consider elimination diet to evaluate for food sensitivities ( common food groups include gluten/ dairy / corn )    3) Maintain healthy vitamin D levels    4) Add anti-inflammatory herbs/ spices to meals - reid/ garlic / basil / parsley / rosemary/  fischer / thyme/  oregano / cinnamon/ cloves / mint / chili pepper    5) Increase intake of omega-3 fatty acids - Take a high quality supplement and add following foods to diet: fish / ground flax seeds / rhett seeds/ hemp seeds / sesame seeds / soybeans / walnuts / brussel sprouts    6) Eat colorful fruits / vegetables    7) Cardiovascular activity 4 or more days / week    8) Get adequate sleep    9) Avoid toxin exposure    10) Consider supplementation with natural products: turmeric/ skullcap root/ green tea extract/ quercetin / vitamin D / bromelain    Consider supplement through [employer]:    To order supplements go to the  "web site: [employer]  Use my authorization number to order the recommended supplements: S99     Advanced Inflammation Support- Take 2 two times a day     Follow-up with lab work in 2 months-           Review of the result(s) of each unique test - ALCAT food sensitivity testing       BMI:   Estimated body mass index is 28.49 kg/m  as calculated from the following:    Height as of 4/15/21: 5' 4\" (1.626 m).    Weight as of 4/15/21: 166 lb (75.3 kg).       No follow-ups on file.    Carmela Her MD  [facility]   Leny Jeffrey is 40 y.o. and presents today for the following health issues   HPI  She is here to f/u pm functional medicine consult for back pain/ headache/ fatigue / elevated MICHELLE.  She has been exercising daily and has lost some weight.  Still has fatigue and back always hurts.  She is concerned about the elevated hs CRP on her lab work.  She had the ALCAT test results back but has not started elimination diet yet.  She denies any dental infections.  She did recall being ill for a day before she had blood draw.  She is seeing rheumatology to evaluate for autoimmune disease.    Review of Systems  As above      Objective       Vitals:  No vitals were obtained today due to virtual visit.    Physical Exam  GENERAL: Healthy, alert and no distress  EYES: Eyes grossly normal to inspection. No discharge or erythema, or obvious scleral/conjunctival abnormalities.  RESP: No audible wheeze, cough, or visible cyanosis.  No visible retractions or increased work of breathing.    NEURO: Cranial nerves grossly intact. Mentation and speech appropriate for age.  PSYCH: Mentation appears normal, affect normal/bright, judgement and insight intact, normal speech and appearance well-groomed    HS CRP-8.1          Video-Visit Details    Type of service:  Video Visit    Video End Time (time video stopped): 5:30 pm  Originating Location (pt. Location): Home    Distant Location " (provider location):  [facility]     Platform used for Video Visit: Fiona

## 2021-06-28 DIAGNOSIS — Z11.59 ENCOUNTER FOR SCREENING FOR OTHER VIRAL DISEASES: ICD-10-CM

## 2021-07-13 PROBLEM — G44.89 OTHER HEADACHE SYNDROME: Status: ACTIVE | Noted: 2018-09-21

## 2021-07-13 PROBLEM — Z97.5 IUD (INTRAUTERINE DEVICE) IN PLACE: Status: ACTIVE | Noted: 2019-08-22

## 2021-07-13 PROBLEM — M54.9 BACKACHE: Status: ACTIVE | Noted: 2021-07-13

## 2021-07-13 RX ORDER — MAGNESIUM 250 MG
2 TABLET ORAL
COMMUNITY
End: 2021-07-15

## 2021-07-13 RX ORDER — ASCORBIC ACID 500 MG
250 TABLET ORAL
COMMUNITY
End: 2021-07-15

## 2021-07-13 RX ORDER — GABAPENTIN 100 MG/1
CAPSULE ORAL
COMMUNITY
Start: 2021-01-07 | End: 2021-07-15

## 2021-07-13 RX ORDER — AMOXICILLIN 500 MG/1
CAPSULE ORAL
COMMUNITY
Start: 2021-01-20 | End: 2021-07-15

## 2021-07-13 RX ORDER — BUPROPION HYDROCHLORIDE 300 MG/1
300 TABLET ORAL EVERY MORNING
COMMUNITY
Start: 2021-04-20 | End: 2021-07-15

## 2021-07-13 RX ORDER — LEVONORGESTREL 19.5 MG/1
INTRAUTERINE DEVICE INTRAUTERINE
COMMUNITY
End: 2021-07-15

## 2021-07-13 RX ORDER — TOPIRAMATE 25 MG/1
100 TABLET, FILM COATED ORAL
COMMUNITY
Start: 2021-03-23 | End: 2021-07-15

## 2021-07-13 RX ORDER — SUMATRIPTAN 50 MG/1
50 TABLET, FILM COATED ORAL
COMMUNITY
Start: 2020-03-04 | End: 2021-09-24

## 2021-07-13 RX ORDER — ONDANSETRON 4 MG/1
TABLET, ORALLY DISINTEGRATING ORAL
COMMUNITY
Start: 2020-01-16 | End: 2021-09-24

## 2021-07-13 RX ORDER — RIZATRIPTAN BENZOATE 5 MG/1
5-10 TABLET, ORALLY DISINTEGRATING ORAL
COMMUNITY
Start: 2020-07-08 | End: 2021-07-15

## 2021-07-13 RX ORDER — GABAPENTIN 300 MG/1
CAPSULE ORAL
COMMUNITY
Start: 2021-01-19 | End: 2021-07-15

## 2021-07-13 RX ORDER — FERROUS SULFATE 325(65) MG
1 TABLET ORAL EVERY OTHER DAY
COMMUNITY

## 2021-07-13 RX ORDER — BUPROPION HYDROCHLORIDE 300 MG/1
300 TABLET ORAL
COMMUNITY
Start: 2021-01-07 | End: 2022-01-11

## 2021-07-14 PROBLEM — Z30.09 UNWANTED FERTILITY: Status: RESOLVED | Noted: 2020-12-10 | Resolved: 2021-01-30

## 2021-07-14 NOTE — PATIENT INSTRUCTIONS
A Healthy You!    Getting and Staying Active  Why should I exercise?   Exercise, being physically active, is very important for all women.   Being active can help you:   Lose or maintain weight   Have more energy   Sleep better   Enjoy sex more   Relieve stress and think better   Lower the chance you will have heart disease, high blood pressure, and diabetes   Strengthen your bones and muscles   Have fewer hot flashes if you are older   How active do I have to be to get the bene?ts of exercise?   Studies show that as little as 15 minutes of moderate exercise--like fast walking or dancing--3 times a week can improve the health of your heart. Ifyou want to get the best benefits from exercise, try to increase your physical activity to at least 30 minutes, 5 times a week. If you have a serious health problem,be sure to talk with your health care provider before starting an exercise program.    Taking Care of your Health: Health Care Maintenance Screening recommendations  In all the things women do, taking care of everything and everybody else, they sometimes forget to take care of themselves. This handout reviews the health screenings and vaccines that are recommended for women of all ages. Talk with yourhealth care provider about which tests and vaccines you need. The chart below lists the screening tests and vaccinations recommended for healthy women who do not have a high risk for most diseases.   The recommendations in thischart are guidelines only. For some tests and vaccines, the chart says you should talk to your health care provider.This is because the best recommendations for you depend on your personal health history. Your health care provider may suggest more frequent testing or additional tests ifyou havea higher chance of getting some diseases    Planning Your Family: Developing a Reproductive Life Plan    Planning ahead can help you avoid getting pregnant when you don t want to be pregnant and also be in  "good health if and when you do decide to become pregnant. Many women have at least one pregnancy in their lives, even if it was not planned. In fact, about half of all pregnancies are not planned. Getting pregnant when you did not plan it can be a problem, or it can turn into a happy event. Planning pregnancy leads to healthier pregnancies, healthier mothers, and healthier families.  Although many women want to have a family, not everyone wants to have children. More and more women are childless by choice (also known as childfree). Whether to have children is a personal choice that only you can make. It s okay not to want children! If you never want to get pregnant, it is important to make sure you always use very effective birth control, such as an intrauterine device, the birth control implant, female sterilization (having your tubes tied), or your partner having a vasectomy.    Reliable resources:        American College of Nurse-Midwives (ACNM) http://www.midwife.org/; look at the informational handouts at http://www.midwife.org/Share-With-Women        Women's Health.gov:  http://www.womenshealth.gov/a-z-topics/index.html    FDA - Nutrition  www.mypyramid.gov  Under \"For Consumers,\" click on \"pregnant and breastfeeding women.\"      Vaccines : Centers for Disease Control and Prevention (CDC) http://www.cdc.gov/vaccines/     [facility] www.[facility].com     When researching information on the web, question the validity of websites.  The evidanza .gov, .edu and.org tend to be more reliable information.  If there are a lot of advertisements, be cautious of the information provided. Stay away from blogs and chat rooms please!          Nutrition and supplements:     Daily multivitamin vitamin (with 400 - 1000 mcg folic acid).  Take with food as needed.     4-5 servings of dairy or other calcium rich foods (fish, leafy greens, soy) per day - if not, take 500-1000 mg additional calcium (Tums, pills, chews). Take with " dairy.     Vitamin D3 4000 IU geltab daily. Vitamin D rich foods: Cod Liver Oil (1Tbsp), Seaman, Mackerel, Tuna, fortified milk and yogurt, Beef and liver, sardines, egg, fortified cereals, swiss cheese.  Take supplements with fattiest meal.       2-3 (4) oz servings of fish, seafood, nuts (walnuts & almonds), oils, avocado per week - if not, take Omega 3 Fatty acids: DHA & CHEN 8197-4030 mg per day.  Other names: cod liver oil, fish oil. Take with fattiest meal.

## 2021-07-15 ENCOUNTER — OFFICE VISIT (OUTPATIENT)
Dept: MIDWIFE SERVICES | Facility: CLINIC | Age: 41
End: 2021-07-15
Payer: COMMERCIAL

## 2021-07-15 VITALS
SYSTOLIC BLOOD PRESSURE: 100 MMHG | HEART RATE: 74 BPM | WEIGHT: 153.9 LBS | HEIGHT: 64 IN | OXYGEN SATURATION: 99 % | DIASTOLIC BLOOD PRESSURE: 64 MMHG | TEMPERATURE: 98.8 F | BODY MASS INDEX: 26.27 KG/M2

## 2021-07-15 DIAGNOSIS — Z01.818 PRE-OPERATIVE EXAMINATION: Primary | ICD-10-CM

## 2021-07-15 DIAGNOSIS — Z30.432 ENCOUNTER FOR IUD REMOVAL: ICD-10-CM

## 2021-07-15 PROBLEM — Z97.5 IUD (INTRAUTERINE DEVICE) IN PLACE: Status: RESOLVED | Noted: 2019-08-22 | Resolved: 2021-07-15

## 2021-07-15 PROCEDURE — 99213 OFFICE O/P EST LOW 20 MIN: CPT | Mod: 25 | Performed by: ADVANCED PRACTICE MIDWIFE

## 2021-07-15 PROCEDURE — 58301 REMOVE INTRAUTERINE DEVICE: CPT | Performed by: ADVANCED PRACTICE MIDWIFE

## 2021-07-15 RX ORDER — CHLORAL HYDRATE 500 MG
1 CAPSULE ORAL DAILY
COMMUNITY

## 2021-07-15 ASSESSMENT — MIFFLIN-ST. JEOR: SCORE: 1353.09

## 2021-07-15 NOTE — PROGRESS NOTES
Leny Jeffrey presents requesting IUD removal.  Reason for IUD removal: Bilateral Laparoscopic Salpingectomy     PROCEDURE:  A speculum was placed and the IUD strings were visualized.  The IUD strings were grasped with a ring forceps and gentle traction was applied.  The Kyleena IUD was easily removed and was noted to be intact.  The speculum was then removed.    A: Kyleena IUD removal well tolerated without complication    P: Warning signs were reviewed with the patient including bleeding, pain and infection. Discussed BCM options if intercourse occurs before date of scheduled salpingectomy.      Patient was seen with student, NELI Paredes who was present for learning. I personally assessed, examined and made clinical decisions reflected in the documentation.         Evi Chawla, JOSH, APRN, CNM

## 2021-07-15 NOTE — H&P (VIEW-ONLY)
[place] [id number] [facility] [address]  [facility] [address]  Phone: [phone]  Fax: [phone]  Primary Provider: Megan Liu  Pre-op Performing Provider: JELENA KEENE      PREOPERATIVE EVALUATION:  Today's date: 7/15/2021    Leny Jeffrey is a 40 year old female who presents for a preoperative evaluation.    Surgical Information:  Surgery/Procedure: Bilateral Laparoscopic Salpingectomy  Surgery Location: [facility]  Surgeon: Zahira Kauffman MD  Surgery Date: 8-4-21  Time of Surgery: To be determined  Where patient plans to recover: At home with family  Fax number for surgical facility: Note does not need to be faxed, will be available electronically in Epic.    Type of Anesthesia Anticipated: General    Assessment & Plan     The proposed surgical procedure is considered INTERMEDIATE risk.    Problem List Items Addressed This Visit     None      Visit Diagnoses     Pre-operative examination    -  Primary    Encounter for IUD removal                   Risks and Recommendations:  The patient has the following additional risks and recommendations for perioperative complications:   - No identified additional risk factors other than previously addressed      RECOMMENDATION:  APPROVAL GIVEN to proceed with proposed procedure, without further diagnostic evaluation.    Review of external notes as documented above     30 minutes spent on the date of the encounter doing chart review, patient visit and documentation         Subjective     HPI related to upcoming procedure desires a permeant birth control method.        Preop Questions 7/15/2021   1. Have you ever had a heart attack or stroke? No   2. Have you ever had surgery on your heart or blood vessels, such as a stent placement, a coronary artery bypass, or surgery on an artery in your head, neck, heart, or legs? No   3. Do you have chest pain with activity? No   4. Do you have  a history of  heart failure? No   5. Do you currently have a cold, bronchitis or symptoms of other infection? No   6. Do you have a cough, shortness of breath, or wheezing? No   7. Do you or anyone in your family have previous history of blood clots? No   8. Do you or does anyone in your family have a serious bleeding problem such as prolonged bleeding following surgeries or cuts? No   9. Have you ever had problems with anemia or been told to take iron pills? No   10. Have you had any abnormal blood loss such as black, tarry or bloody stools, or abnormal vaginal bleeding? No   11. Have you ever had a blood transfusion? No   12. Are you willing to have a blood transfusion if it is medically needed before, during, or after your surgery? Yes   13. Have you or any of your relatives ever had problems with anesthesia? No   14. Do you have sleep apnea, excessive snoring or daytime drowsiness? No   15. Do you have any artifical heart valves or other implanted medical devices like a pacemaker, defibrillator, or continuous glucose monitor? No   16. Do you have artificial joints? No   17. Are you allergic to latex? No   18. Is there any chance that you may be pregnant? No     Health Care Directive:  Patient does not have a Health Care Directive or Living Will: Discussed advance care planning with patient; information given to patient to review.    Preoperative Review of :   reviewed no controled substances         Status of Chronic Conditions:  See problem list for active medical problems.  Problems all longstanding and stable.       Review of Systems  CONSTITUTIONAL: NEGATIVE for fever, chills, change in weight  INTEGUMENTARY/SKIN: NEGATIVE for worrisome rashes, moles or lesions  EYES: NEGATIVE for vision changes or irritation  ENT/MOUTH: NEGATIVE for ear, mouth and throat problems  RESP: NEGATIVE for significant cough or SOB  BREAST: NEGATIVE for masses, tenderness or discharge  CV: NEGATIVE for chest pain,  palpitations or peripheral edema  GI: NEGATIVE for nausea, abdominal pain, heartburn, or change in bowel habits  : NEGATIVE for frequency, dysuria, or hematuria  MUSCULOSKELETAL: NEGATIVE for significant arthralgias or myalgia  NEURO: NEGATIVE for weakness, dizziness or paresthesias  ENDOCRINE: NEGATIVE for temperature intolerance, skin/hair changes  HEME: NEGATIVE for bleeding problems  PSYCHIATRIC: NEGATIVE for changes in mood or affect    Patient Active Problem List    Diagnosis Date Noted     Backache 07/13/2021     Priority: Medium     Formatting of this note might be different from the original.  Created by Conversion    Replacement Utility updated for latest IMO load       IUD (intrauterine device) in place 08/22/2019     Priority: Medium     Paragard placed 12/2018    Formatting of this note might be different from the original.  Paragard placed 12/2018 self removed   01/30/21 Kyleena placed, remove in 5 years       Other headache syndrome 09/21/2018     Priority: Medium     Evaluated by [facility] 8/18 - felt to be due to chronic neck muscle tension, started on tizanidine    Formatting of this note might be different from the original.  Evaluated by [facility] 8/18 - felt to be due to chronic neck muscle tension, started on tizanidine       History of abnormal cervical Pap smear 05/18/2015     Priority: Medium     LEEP 2014 5/2015: NIL and HPV neg  7/15/2016: NIL and neg HPV  8/22/2019: NILM and neg HPV, next due 8/2024    Formatting of this note might be different from the original.  LEEP 2014 5/2015: NIL and HPV neg  7/15/2016: NIL and neg HPV  8/22/2019: NILM and neg HPV, next due 8/2024        Past Medical History:   Diagnosis Date     Abnormal Pap smear of cervix     Multiple, LEEP 2014; wnl 2016     Past Surgical History:   Procedure Laterality Date     BIOPSY CERVICAL, LOCAL EXCISION, SINGLE/MULTIPLE  2014    CIN2, Path report showed margins were not clear but pap smear 6 months later was  normal     HC REPAIR OF NASAL SEPTUM      Description: Septoplasty;  Recorded: 03/26/2008;     WISDOM TOOTH EXTRACTION  2000     Current Outpatient Medications   Medication Sig Dispense Refill     buPROPion (WELLBUTRIN XL) 300 MG 24 hr tablet Take 300 mg by mouth       ondansetron (ZOFRAN-ODT) 4 MG ODT tab as needed.       rizatriptan (MAXALT-MLT) 5 MG ODT Take 5-10 mg by mouth       SUMAtriptan (IMITREX) 50 MG tablet Take 50 mg by mouth       topiramate (TOPAMAX) 25 MG tablet 100 mg       amoxicillin (AMOXIL) 500 MG capsule TAKE 2 CAPSULES BY MOUTH AS SOON AS POSSIBLE, THEN 1 CAPSULE BY MOUTH EVERY SIX HOURS FOR INFECTION UNTIL GONE.       ascorbic acid 500 MG TABS Take 250 mg by mouth every other day       buPROPion (WELLBUTRIN XL) 150 MG 24 hr tablet Take 300 mg by mouth every morning        buPROPion (WELLBUTRIN XL) 300 MG 24 hr tablet Take 300 mg by mouth every morning       Calcium Carbonate-Vit D-Min (CALCIUM 600+D3 PLUS MINERALS PO)        cholecalciferol 50 MCG (2000 UT) tablet Take 2,000 Units by mouth       ferrous sulfate (FEROSUL) 325 (65 Fe) MG tablet Take 1 tablet by mouth       Ferrous Sulfate (IRON) 325 (65 Fe) MG tablet Take 1 tablet by mouth every other day        gabapentin (NEURONTIN) 100 MG capsule TAKE 1 TO 3 CAPSULES BY MOUTH AT BEDTIME       gabapentin (NEURONTIN) 300 MG capsule Take 1 capsule (300 mg total) by mouth 3 (three) times a day as needed.       levonorgestrel (KYLEENA) 19.5 MG IUD        magnesium (SM MAGNESIUM) 250 MG tablet Take 2 tablets by mouth       magnesium 250 MG tablet Take 1 tablet by mouth every other day        ondansetron (ZOFRAN-ODT) 4 MG ODT tab Take 1 tablet (4 mg) by mouth every 6 hours as needed for nausea (Patient not taking: Reported on 6/9/2020) 20 tablet 6     prochlorperazine (COMPAZINE) 5 MG tablet Take 0.5-1 tablets (2.5-5 mg) by mouth every 6 hours as needed for nausea or vomiting (Patient not taking: Reported on 6/9/2020) 20 tablet 3     rizatriptan  "(MAXALT-MLT) 5 MG ODT Take 1-2 tablets (5-10 mg) by mouth at onset of headache for migraine (may repeat in 2 hours as needed. Max 30 mg in 24 hours) 18 tablet 6     SUMAtriptan (IMITREX STATDOSE) 6 MG/0.5ML pen injector kit Inject 0.5 mLs (6 mg) Subcutaneous at onset of headache for migraine (may repeat in one hour as needed. Max 12 mg in 24 hours) 3 kit 9     topiramate (TOPAMAX) 25 MG tablet TAKE 4 TABS (100MG) at bedtime 360 tablet 1     vitamin C (ASCORBIC ACID) 500 MG tablet Take 250 mg by mouth       vitamin C (ASCORBIC ACID) 500 MG tablet Take 250 mg by mouth every other day          No Known Allergies     Social History     Tobacco Use     Smoking status: Never Smoker     Smokeless tobacco: Never Used   Substance Use Topics     Alcohol use: Yes     Comment: Rarely     Family History   Problem Relation Age of Onset     No Known Problems Mother      Mental Illness Father      Alcoholism Father      No Known Problems Sister      Colon Cancer Maternal Grandmother 60.00     Cancer Maternal Grandfather      Heart Disease Paternal Grandmother      Coronary Artery Disease Paternal Grandfather      No Known Problems Son      No Known Problems Daughter      Colon Cancer Paternal Aunt         early 60s     Aortic dissection Maternal Uncle         50's     History   Drug Use Unknown         Objective     /64 (BP Location: Right arm, Patient Position: Sitting, Cuff Size: Adult Regular)   Pulse 74   Temp 98.8  F (37.1  C) (Oral)   Ht 1.626 m (5' 4\")   Wt 69.8 kg (153 lb 14.4 oz)   LMP 06/25/2021   SpO2 99%   Breastfeeding No   BMI 26.42 kg/m       Physical Exam  Constitutional: Patient is oriented to person, place, and time. Patient appears well-developed and well-nourished. No distress.   Head: Normocephalic and atraumatic.   Right Ear: External ear normal.   Left Ear: External ear normal.   Nose: Nose normal.   Mouth/Throat: Oropharynx is clear and moist. No oropharyngeal exudate.   Eyes: Conjunctivae and " EOM are normal. Pupils are equal, round, and reactive to light. Right eye exhibits no discharge. Left eye exhibits no discharge. No scleral icterus.   Neck: Neck supple. No JVD present. No tracheal deviation present. No thyromegaly present.   Breasts:  deferred  Cardiovascular: Normal rate, regular rhythm, normal heart sounds and intact distal pulses. No murmur heard.   Pulmonary/Chest: Effort normal and breath sounds normal. No stridor. No respiratory distress. Patient has no wheezes, no rales, exhibits no tenderness.   Abdominal: Soft. Bowel sounds are normal. Patient exhibits no distension and no mass. There is no tenderness. There is no rebound and no guarding.   Lymphadenopathy:  Patient has no cervical adenopathy.   Neurological: Patient is alert and oriented to person, place, and time. Patient has normal reflexes. No cranial nerve deficit. Coordination normal.   Skin: Skin is warm and dry. No rash noted. Patient is not diaphoretic. No erythema. No pallor.   Pelvic:  Normal external genitalia with Normal vulva.  Normal vagina with no polyps or lesions and with physiologic discharge.  Normal cervix with normal mucosa.  No adnexal masses. Kyleena strings present, grasped with ring forceps and removed without issue. Shown to patient, appears complete, and discarded to medical waste.  Psychiatric: Patient has good eye contact without any psychomotor retardation or stereotypic behaviors.  normal mood and affect. Judgment and thought content normal.   Speech is regular rate and rhythm.         Recent Labs   Lab Test 05/07/21  0730 06/11/20  1449 12/17/19  1629   HGB 14.9 15.1  --     282  --    NA  --   --  139   POTASSIUM  --   --  3.7   CR 1.06 1.00 0.95        Diagnostics:  No labs were ordered during this visit.   No EKG required, no history of coronary heart disease, significant arrhythmia, peripheral arterial disease or other structural heart disease.    Revised Cardiac Risk Index (RCRI):  The patient  has the following serious cardiovascular risks for perioperative complications:   - No serious cardiac risks = 0 points     RCRI Interpretation: 0 points: Class I (very low risk - 0.4% complication rate)     Patient was seen with student, NELI Paredes who was present for learning. I personally assessed, examined and made clinical decisions reflected in the documentation.       Signed Electronically by: Evi Chawla CNM  Copy of this evaluation report is provided to requesting physician.

## 2021-07-15 NOTE — PROGRESS NOTES
[place] [id number] [facility] [address]  [facility] [address]  Phone: [phone]  Fax: [phone]  Primary Provider: Megan Liu  Pre-op Performing Provider: JELENA KEENE      PREOPERATIVE EVALUATION:  Today's date: 7/15/2021    Leny Jeffrey is a 40 year old female who presents for a preoperative evaluation.    Surgical Information:  Surgery/Procedure: Bilateral Laparoscopic Salpingectomy  Surgery Location: [facility]  Surgeon: Zahira Kauffman MD  Surgery Date: 8-4-21  Time of Surgery: To be determined  Where patient plans to recover: At home with family  Fax number for surgical facility: Note does not need to be faxed, will be available electronically in Epic.    Type of Anesthesia Anticipated: General    Assessment & Plan     The proposed surgical procedure is considered INTERMEDIATE risk.    Problem List Items Addressed This Visit     None      Visit Diagnoses     Pre-operative examination    -  Primary    Encounter for IUD removal                   Risks and Recommendations:  The patient has the following additional risks and recommendations for perioperative complications:   - No identified additional risk factors other than previously addressed      RECOMMENDATION:  APPROVAL GIVEN to proceed with proposed procedure, without further diagnostic evaluation.    Review of external notes as documented above     30 minutes spent on the date of the encounter doing chart review, patient visit and documentation         Subjective     HPI related to upcoming procedure desires a permeant birth control method.        Preop Questions 7/15/2021   1. Have you ever had a heart attack or stroke? No   2. Have you ever had surgery on your heart or blood vessels, such as a stent placement, a coronary artery bypass, or surgery on an artery in your head, neck, heart, or legs? No   3. Do you have chest pain with activity? No   4. Do you have  a history of  heart failure? No   5. Do you currently have a cold, bronchitis or symptoms of other infection? No   6. Do you have a cough, shortness of breath, or wheezing? No   7. Do you or anyone in your family have previous history of blood clots? No   8. Do you or does anyone in your family have a serious bleeding problem such as prolonged bleeding following surgeries or cuts? No   9. Have you ever had problems with anemia or been told to take iron pills? No   10. Have you had any abnormal blood loss such as black, tarry or bloody stools, or abnormal vaginal bleeding? No   11. Have you ever had a blood transfusion? No   12. Are you willing to have a blood transfusion if it is medically needed before, during, or after your surgery? Yes   13. Have you or any of your relatives ever had problems with anesthesia? No   14. Do you have sleep apnea, excessive snoring or daytime drowsiness? No   15. Do you have any artifical heart valves or other implanted medical devices like a pacemaker, defibrillator, or continuous glucose monitor? No   16. Do you have artificial joints? No   17. Are you allergic to latex? No   18. Is there any chance that you may be pregnant? No     Health Care Directive:  Patient does not have a Health Care Directive or Living Will: Discussed advance care planning with patient; information given to patient to review.    Preoperative Review of :   reviewed no controled substances         Status of Chronic Conditions:  See problem list for active medical problems.  Problems all longstanding and stable.       Review of Systems  CONSTITUTIONAL: NEGATIVE for fever, chills, change in weight  INTEGUMENTARY/SKIN: NEGATIVE for worrisome rashes, moles or lesions  EYES: NEGATIVE for vision changes or irritation  ENT/MOUTH: NEGATIVE for ear, mouth and throat problems  RESP: NEGATIVE for significant cough or SOB  BREAST: NEGATIVE for masses, tenderness or discharge  CV: NEGATIVE for chest pain,  palpitations or peripheral edema  GI: NEGATIVE for nausea, abdominal pain, heartburn, or change in bowel habits  : NEGATIVE for frequency, dysuria, or hematuria  MUSCULOSKELETAL: NEGATIVE for significant arthralgias or myalgia  NEURO: NEGATIVE for weakness, dizziness or paresthesias  ENDOCRINE: NEGATIVE for temperature intolerance, skin/hair changes  HEME: NEGATIVE for bleeding problems  PSYCHIATRIC: NEGATIVE for changes in mood or affect    Patient Active Problem List    Diagnosis Date Noted     Backache 07/13/2021     Priority: Medium     Formatting of this note might be different from the original.  Created by Conversion    Replacement Utility updated for latest IMO load       IUD (intrauterine device) in place 08/22/2019     Priority: Medium     Paragard placed 12/2018    Formatting of this note might be different from the original.  Paragard placed 12/2018 self removed   01/30/21 Kyleena placed, remove in 5 years       Other headache syndrome 09/21/2018     Priority: Medium     Evaluated by [facility] 8/18 - felt to be due to chronic neck muscle tension, started on tizanidine    Formatting of this note might be different from the original.  Evaluated by [facility] 8/18 - felt to be due to chronic neck muscle tension, started on tizanidine       History of abnormal cervical Pap smear 05/18/2015     Priority: Medium     LEEP 2014 5/2015: NIL and HPV neg  7/15/2016: NIL and neg HPV  8/22/2019: NILM and neg HPV, next due 8/2024    Formatting of this note might be different from the original.  LEEP 2014 5/2015: NIL and HPV neg  7/15/2016: NIL and neg HPV  8/22/2019: NILM and neg HPV, next due 8/2024        Past Medical History:   Diagnosis Date     Abnormal Pap smear of cervix     Multiple, LEEP 2014; wnl 2016     Past Surgical History:   Procedure Laterality Date     BIOPSY CERVICAL, LOCAL EXCISION, SINGLE/MULTIPLE  2014    CIN2, Path report showed margins were not clear but pap smear 6 months later was  normal     HC REPAIR OF NASAL SEPTUM      Description: Septoplasty;  Recorded: 03/26/2008;     WISDOM TOOTH EXTRACTION  2000     Current Outpatient Medications   Medication Sig Dispense Refill     buPROPion (WELLBUTRIN XL) 300 MG 24 hr tablet Take 300 mg by mouth       ondansetron (ZOFRAN-ODT) 4 MG ODT tab as needed.       rizatriptan (MAXALT-MLT) 5 MG ODT Take 5-10 mg by mouth       SUMAtriptan (IMITREX) 50 MG tablet Take 50 mg by mouth       topiramate (TOPAMAX) 25 MG tablet 100 mg       amoxicillin (AMOXIL) 500 MG capsule TAKE 2 CAPSULES BY MOUTH AS SOON AS POSSIBLE, THEN 1 CAPSULE BY MOUTH EVERY SIX HOURS FOR INFECTION UNTIL GONE.       ascorbic acid 500 MG TABS Take 250 mg by mouth every other day       buPROPion (WELLBUTRIN XL) 150 MG 24 hr tablet Take 300 mg by mouth every morning        buPROPion (WELLBUTRIN XL) 300 MG 24 hr tablet Take 300 mg by mouth every morning       Calcium Carbonate-Vit D-Min (CALCIUM 600+D3 PLUS MINERALS PO)        cholecalciferol 50 MCG (2000 UT) tablet Take 2,000 Units by mouth       ferrous sulfate (FEROSUL) 325 (65 Fe) MG tablet Take 1 tablet by mouth       Ferrous Sulfate (IRON) 325 (65 Fe) MG tablet Take 1 tablet by mouth every other day        gabapentin (NEURONTIN) 100 MG capsule TAKE 1 TO 3 CAPSULES BY MOUTH AT BEDTIME       gabapentin (NEURONTIN) 300 MG capsule Take 1 capsule (300 mg total) by mouth 3 (three) times a day as needed.       levonorgestrel (KYLEENA) 19.5 MG IUD        magnesium (SM MAGNESIUM) 250 MG tablet Take 2 tablets by mouth       magnesium 250 MG tablet Take 1 tablet by mouth every other day        ondansetron (ZOFRAN-ODT) 4 MG ODT tab Take 1 tablet (4 mg) by mouth every 6 hours as needed for nausea (Patient not taking: Reported on 6/9/2020) 20 tablet 6     prochlorperazine (COMPAZINE) 5 MG tablet Take 0.5-1 tablets (2.5-5 mg) by mouth every 6 hours as needed for nausea or vomiting (Patient not taking: Reported on 6/9/2020) 20 tablet 3     rizatriptan  "(MAXALT-MLT) 5 MG ODT Take 1-2 tablets (5-10 mg) by mouth at onset of headache for migraine (may repeat in 2 hours as needed. Max 30 mg in 24 hours) 18 tablet 6     SUMAtriptan (IMITREX STATDOSE) 6 MG/0.5ML pen injector kit Inject 0.5 mLs (6 mg) Subcutaneous at onset of headache for migraine (may repeat in one hour as needed. Max 12 mg in 24 hours) 3 kit 9     topiramate (TOPAMAX) 25 MG tablet TAKE 4 TABS (100MG) at bedtime 360 tablet 1     vitamin C (ASCORBIC ACID) 500 MG tablet Take 250 mg by mouth       vitamin C (ASCORBIC ACID) 500 MG tablet Take 250 mg by mouth every other day          No Known Allergies     Social History     Tobacco Use     Smoking status: Never Smoker     Smokeless tobacco: Never Used   Substance Use Topics     Alcohol use: Yes     Comment: Rarely     Family History   Problem Relation Age of Onset     No Known Problems Mother      Mental Illness Father      Alcoholism Father      No Known Problems Sister      Colon Cancer Maternal Grandmother 60.00     Cancer Maternal Grandfather      Heart Disease Paternal Grandmother      Coronary Artery Disease Paternal Grandfather      No Known Problems Son      No Known Problems Daughter      Colon Cancer Paternal Aunt         early 60s     Aortic dissection Maternal Uncle         50's     History   Drug Use Unknown         Objective     /64 (BP Location: Right arm, Patient Position: Sitting, Cuff Size: Adult Regular)   Pulse 74   Temp 98.8  F (37.1  C) (Oral)   Ht 1.626 m (5' 4\")   Wt 69.8 kg (153 lb 14.4 oz)   LMP 06/25/2021   SpO2 99%   Breastfeeding No   BMI 26.42 kg/m       Physical Exam  Constitutional: Patient is oriented to person, place, and time. Patient appears well-developed and well-nourished. No distress.   Head: Normocephalic and atraumatic.   Right Ear: External ear normal.   Left Ear: External ear normal.   Nose: Nose normal.   Mouth/Throat: Oropharynx is clear and moist. No oropharyngeal exudate.   Eyes: Conjunctivae and " EOM are normal. Pupils are equal, round, and reactive to light. Right eye exhibits no discharge. Left eye exhibits no discharge. No scleral icterus.   Neck: Neck supple. No JVD present. No tracheal deviation present. No thyromegaly present.   Breasts:  deferred  Cardiovascular: Normal rate, regular rhythm, normal heart sounds and intact distal pulses. No murmur heard.   Pulmonary/Chest: Effort normal and breath sounds normal. No stridor. No respiratory distress. Patient has no wheezes, no rales, exhibits no tenderness.   Abdominal: Soft. Bowel sounds are normal. Patient exhibits no distension and no mass. There is no tenderness. There is no rebound and no guarding.   Lymphadenopathy:  Patient has no cervical adenopathy.   Neurological: Patient is alert and oriented to person, place, and time. Patient has normal reflexes. No cranial nerve deficit. Coordination normal.   Skin: Skin is warm and dry. No rash noted. Patient is not diaphoretic. No erythema. No pallor.   Pelvic:  Normal external genitalia with Normal vulva.  Normal vagina with no polyps or lesions and with physiologic discharge.  Normal cervix with normal mucosa.  No adnexal masses. Kyleena strings present, grasped with ring forceps and removed without issue. Shown to patient, appears complete, and discarded to medical waste.  Psychiatric: Patient has good eye contact without any psychomotor retardation or stereotypic behaviors.  normal mood and affect. Judgment and thought content normal.   Speech is regular rate and rhythm.         Recent Labs   Lab Test 05/07/21  0730 06/11/20  1449 12/17/19  1629   HGB 14.9 15.1  --     282  --    NA  --   --  139   POTASSIUM  --   --  3.7   CR 1.06 1.00 0.95        Diagnostics:  No labs were ordered during this visit.   No EKG required, no history of coronary heart disease, significant arrhythmia, peripheral arterial disease or other structural heart disease.    Revised Cardiac Risk Index (RCRI):  The patient  has the following serious cardiovascular risks for perioperative complications:   - No serious cardiac risks = 0 points     RCRI Interpretation: 0 points: Class I (very low risk - 0.4% complication rate)     Patient was seen with student, NELI Paredes who was present for learning. I personally assessed, examined and made clinical decisions reflected in the documentation.       Signed Electronically by: Evi Chawla CNM  Copy of this evaluation report is provided to requesting physician.

## 2021-08-01 ENCOUNTER — LAB (OUTPATIENT)
Dept: FAMILY MEDICINE | Facility: CLINIC | Age: 41
End: 2021-08-01
Attending: OBSTETRICS & GYNECOLOGY
Payer: COMMERCIAL

## 2021-08-01 DIAGNOSIS — Z11.59 ENCOUNTER FOR SCREENING FOR OTHER VIRAL DISEASES: ICD-10-CM

## 2021-08-01 PROCEDURE — U0003 INFECTIOUS AGENT DETECTION BY NUCLEIC ACID (DNA OR RNA); SEVERE ACUTE RESPIRATORY SYNDROME CORONAVIRUS 2 (SARS-COV-2) (CORONAVIRUS DISEASE [COVID-19]), AMPLIFIED PROBE TECHNIQUE, MAKING USE OF HIGH THROUGHPUT TECHNOLOGIES AS DESCRIBED BY CMS-2020-01-R: HCPCS

## 2021-08-01 PROCEDURE — U0005 INFEC AGEN DETEC AMPLI PROBE: HCPCS

## 2021-08-02 LAB — SARS-COV-2 RNA RESP QL NAA+PROBE: NEGATIVE

## 2021-08-02 ASSESSMENT — MIFFLIN-ST. JEOR: SCORE: 1349

## 2021-08-03 ENCOUNTER — ANESTHESIA EVENT (OUTPATIENT)
Dept: SURGERY | Facility: AMBULATORY SURGERY CENTER | Age: 41
End: 2021-08-03
Payer: COMMERCIAL

## 2021-08-04 ENCOUNTER — ANESTHESIA (OUTPATIENT)
Dept: SURGERY | Facility: AMBULATORY SURGERY CENTER | Age: 41
End: 2021-08-04
Payer: COMMERCIAL

## 2021-08-04 ENCOUNTER — TRANSFERRED RECORDS (OUTPATIENT)
Dept: HEALTH INFORMATION MANAGEMENT | Facility: CLINIC | Age: 41
End: 2021-08-04

## 2021-08-04 ENCOUNTER — HOSPITAL ENCOUNTER (OUTPATIENT)
Facility: AMBULATORY SURGERY CENTER | Age: 41
End: 2021-08-04
Attending: OBSTETRICS & GYNECOLOGY
Payer: COMMERCIAL

## 2021-08-04 VITALS
DIASTOLIC BLOOD PRESSURE: 49 MMHG | SYSTOLIC BLOOD PRESSURE: 104 MMHG | RESPIRATION RATE: 16 BRPM | HEART RATE: 74 BPM | OXYGEN SATURATION: 99 % | HEIGHT: 64 IN | TEMPERATURE: 97.4 F | BODY MASS INDEX: 26.12 KG/M2 | WEIGHT: 153 LBS

## 2021-08-04 DIAGNOSIS — Z30.09 UNWANTED FERTILITY: ICD-10-CM

## 2021-08-04 DIAGNOSIS — G89.18 POST-OP PAIN: Primary | ICD-10-CM

## 2021-08-04 LAB
HCG UR QL: NEGATIVE
INTERNAL QC OK POCT: NORMAL

## 2021-08-04 PROCEDURE — 58661 LAPAROSCOPY REMOVE ADNEXA: CPT | Performed by: OBSTETRICS & GYNECOLOGY

## 2021-08-04 RX ORDER — OXYCODONE HYDROCHLORIDE 5 MG/1
5 TABLET ORAL EVERY 4 HOURS PRN
Status: DISCONTINUED | OUTPATIENT
Start: 2021-08-04 | End: 2021-08-05 | Stop reason: HOSPADM

## 2021-08-04 RX ORDER — MAGNESIUM SULFATE HEPTAHYDRATE 40 MG/ML
4 INJECTION, SOLUTION INTRAVENOUS ONCE
Status: COMPLETED | OUTPATIENT
Start: 2021-08-04 | End: 2021-08-04

## 2021-08-04 RX ORDER — SODIUM CHLORIDE, SODIUM LACTATE, POTASSIUM CHLORIDE, CALCIUM CHLORIDE 600; 310; 30; 20 MG/100ML; MG/100ML; MG/100ML; MG/100ML
INJECTION, SOLUTION INTRAVENOUS CONTINUOUS
Status: DISCONTINUED | OUTPATIENT
Start: 2021-08-04 | End: 2021-08-05 | Stop reason: HOSPADM

## 2021-08-04 RX ORDER — FENTANYL CITRATE 50 UG/ML
INJECTION, SOLUTION INTRAMUSCULAR; INTRAVENOUS PRN
Status: DISCONTINUED | OUTPATIENT
Start: 2021-08-04 | End: 2021-08-04

## 2021-08-04 RX ORDER — LIDOCAINE 40 MG/G
CREAM TOPICAL
Status: DISCONTINUED | OUTPATIENT
Start: 2021-08-04 | End: 2021-08-05 | Stop reason: HOSPADM

## 2021-08-04 RX ORDER — ESMOLOL HYDROCHLORIDE 10 MG/ML
INJECTION INTRAVENOUS PRN
Status: DISCONTINUED | OUTPATIENT
Start: 2021-08-04 | End: 2021-08-04

## 2021-08-04 RX ORDER — ONDANSETRON 2 MG/ML
INJECTION INTRAMUSCULAR; INTRAVENOUS PRN
Status: DISCONTINUED | OUTPATIENT
Start: 2021-08-04 | End: 2021-08-04

## 2021-08-04 RX ORDER — ACETAMINOPHEN 325 MG/1
975 TABLET ORAL EVERY 4 HOURS PRN
Status: DISCONTINUED | OUTPATIENT
Start: 2021-08-04 | End: 2021-08-05 | Stop reason: HOSPADM

## 2021-08-04 RX ORDER — KETAMINE HYDROCHLORIDE 10 MG/ML
INJECTION INTRAMUSCULAR; INTRAVENOUS PRN
Status: DISCONTINUED | OUTPATIENT
Start: 2021-08-04 | End: 2021-08-04

## 2021-08-04 RX ORDER — ACETAMINOPHEN 325 MG/1
975 TABLET ORAL ONCE
Status: COMPLETED | OUTPATIENT
Start: 2021-08-04 | End: 2021-08-04

## 2021-08-04 RX ORDER — DEXAMETHASONE SODIUM PHOSPHATE 4 MG/ML
INJECTION, SOLUTION INTRA-ARTICULAR; INTRALESIONAL; INTRAMUSCULAR; INTRAVENOUS; SOFT TISSUE PRN
Status: DISCONTINUED | OUTPATIENT
Start: 2021-08-04 | End: 2021-08-04

## 2021-08-04 RX ORDER — FENTANYL CITRATE 50 UG/ML
25 INJECTION, SOLUTION INTRAMUSCULAR; INTRAVENOUS EVERY 5 MIN PRN
Status: SHIPPED | OUTPATIENT
Start: 2021-08-04 | End: 2021-08-04

## 2021-08-04 RX ORDER — IBUPROFEN 800 MG/1
800 TABLET, FILM COATED ORAL EVERY 6 HOURS PRN
Qty: 30 TABLET | Refills: 0 | Status: SHIPPED | OUTPATIENT
Start: 2021-08-04

## 2021-08-04 RX ORDER — IBUPROFEN 800 MG/1
800 TABLET, FILM COATED ORAL ONCE
Status: DISCONTINUED | OUTPATIENT
Start: 2021-08-04 | End: 2021-08-05 | Stop reason: HOSPADM

## 2021-08-04 RX ORDER — OXYCODONE HYDROCHLORIDE 5 MG/1
5 TABLET ORAL
Status: DISCONTINUED | OUTPATIENT
Start: 2021-08-04 | End: 2021-08-05 | Stop reason: HOSPADM

## 2021-08-04 RX ORDER — ONDANSETRON 2 MG/ML
4 INJECTION INTRAMUSCULAR; INTRAVENOUS EVERY 30 MIN PRN
Status: DISCONTINUED | OUTPATIENT
Start: 2021-08-04 | End: 2021-08-05 | Stop reason: HOSPADM

## 2021-08-04 RX ORDER — ONDANSETRON 4 MG/1
4 TABLET, ORALLY DISINTEGRATING ORAL EVERY 30 MIN PRN
Status: DISCONTINUED | OUTPATIENT
Start: 2021-08-04 | End: 2021-08-05 | Stop reason: HOSPADM

## 2021-08-04 RX ORDER — FENTANYL CITRATE 50 UG/ML
25 INJECTION, SOLUTION INTRAMUSCULAR; INTRAVENOUS EVERY 5 MIN PRN
Status: DISCONTINUED | OUTPATIENT
Start: 2021-08-04 | End: 2021-08-05 | Stop reason: HOSPADM

## 2021-08-04 RX ORDER — SCOLOPAMINE TRANSDERMAL SYSTEM 1 MG/1
1 PATCH, EXTENDED RELEASE TRANSDERMAL ONCE
Status: DISCONTINUED | OUTPATIENT
Start: 2021-08-04 | End: 2021-08-05 | Stop reason: HOSPADM

## 2021-08-04 RX ORDER — PROPOFOL 10 MG/ML
INJECTION, EMULSION INTRAVENOUS CONTINUOUS PRN
Status: DISCONTINUED | OUTPATIENT
Start: 2021-08-04 | End: 2021-08-04

## 2021-08-04 RX ORDER — ACETAMINOPHEN 325 MG/1
975 TABLET ORAL ONCE
Status: DISCONTINUED | OUTPATIENT
Start: 2021-08-04 | End: 2021-08-05 | Stop reason: HOSPADM

## 2021-08-04 RX ORDER — GLYCOPYRROLATE 0.2 MG/ML
INJECTION, SOLUTION INTRAMUSCULAR; INTRAVENOUS PRN
Status: DISCONTINUED | OUTPATIENT
Start: 2021-08-04 | End: 2021-08-04

## 2021-08-04 RX ORDER — PROPOFOL 10 MG/ML
INJECTION, EMULSION INTRAVENOUS PRN
Status: DISCONTINUED | OUTPATIENT
Start: 2021-08-04 | End: 2021-08-04

## 2021-08-04 RX ORDER — LIDOCAINE HYDROCHLORIDE 20 MG/ML
INJECTION, SOLUTION INFILTRATION; PERINEURAL PRN
Status: DISCONTINUED | OUTPATIENT
Start: 2021-08-04 | End: 2021-08-04

## 2021-08-04 RX ORDER — ACETAMINOPHEN 325 MG/1
975 TABLET ORAL EVERY 6 HOURS PRN
Qty: 50 TABLET | Refills: 0 | Status: SHIPPED | OUTPATIENT
Start: 2021-08-04

## 2021-08-04 RX ORDER — LABETALOL 20 MG/4 ML (5 MG/ML) INTRAVENOUS SYRINGE
5
Status: SHIPPED | OUTPATIENT
Start: 2021-08-04 | End: 2021-08-04

## 2021-08-04 RX ORDER — KETOROLAC TROMETHAMINE 30 MG/ML
INJECTION, SOLUTION INTRAMUSCULAR; INTRAVENOUS PRN
Status: DISCONTINUED | OUTPATIENT
Start: 2021-08-04 | End: 2021-08-04

## 2021-08-04 RX ORDER — NEOSTIGMINE METHYLSULFATE 1 MG/ML
VIAL (ML) INJECTION PRN
Status: DISCONTINUED | OUTPATIENT
Start: 2021-08-04 | End: 2021-08-04

## 2021-08-04 RX ORDER — HYDROMORPHONE HCL IN WATER/PF 6 MG/30 ML
0.2 PATIENT CONTROLLED ANALGESIA SYRINGE INTRAVENOUS EVERY 5 MIN PRN
Status: DISCONTINUED | OUTPATIENT
Start: 2021-08-04 | End: 2021-08-05 | Stop reason: HOSPADM

## 2021-08-04 RX ADMIN — Medication 100 MCG: at 08:45

## 2021-08-04 RX ADMIN — Medication 100 MCG: at 08:48

## 2021-08-04 RX ADMIN — KETOROLAC TROMETHAMINE 15 MG: 30 INJECTION, SOLUTION INTRAMUSCULAR; INTRAVENOUS at 09:11

## 2021-08-04 RX ADMIN — SODIUM CHLORIDE, SODIUM LACTATE, POTASSIUM CHLORIDE, CALCIUM CHLORIDE: 600; 310; 30; 20 INJECTION, SOLUTION INTRAVENOUS at 08:27

## 2021-08-04 RX ADMIN — Medication 200 MCG: at 08:54

## 2021-08-04 RX ADMIN — LIDOCAINE HYDROCHLORIDE 60 MG: 20 INJECTION, SOLUTION INFILTRATION; PERINEURAL at 08:38

## 2021-08-04 RX ADMIN — ACETAMINOPHEN 975 MG: 325 TABLET ORAL at 08:13

## 2021-08-04 RX ADMIN — FENTANYL CITRATE 50 MCG: 50 INJECTION, SOLUTION INTRAMUSCULAR; INTRAVENOUS at 08:38

## 2021-08-04 RX ADMIN — DEXAMETHASONE SODIUM PHOSPHATE 10 MG: 4 INJECTION, SOLUTION INTRA-ARTICULAR; INTRALESIONAL; INTRAMUSCULAR; INTRAVENOUS; SOFT TISSUE at 08:38

## 2021-08-04 RX ADMIN — KETAMINE HYDROCHLORIDE 20 MG: 10 INJECTION INTRAMUSCULAR; INTRAVENOUS at 09:00

## 2021-08-04 RX ADMIN — PROPOFOL 50 MG: 10 INJECTION, EMULSION INTRAVENOUS at 09:10

## 2021-08-04 RX ADMIN — ONDANSETRON 4 MG: 2 INJECTION INTRAMUSCULAR; INTRAVENOUS at 08:38

## 2021-08-04 RX ADMIN — GLYCOPYRROLATE 0.2 MG: 0.2 INJECTION, SOLUTION INTRAMUSCULAR; INTRAVENOUS at 08:38

## 2021-08-04 RX ADMIN — ESMOLOL HYDROCHLORIDE 70 MG: 10 INJECTION INTRAVENOUS at 08:38

## 2021-08-04 RX ADMIN — PROPOFOL 120 MG: 10 INJECTION, EMULSION INTRAVENOUS at 08:38

## 2021-08-04 RX ADMIN — Medication 3 MG: at 09:09

## 2021-08-04 RX ADMIN — MAGNESIUM SULFATE HEPTAHYDRATE 4 G: 40 INJECTION, SOLUTION INTRAVENOUS at 08:13

## 2021-08-04 RX ADMIN — Medication 30 MG: at 08:38

## 2021-08-04 RX ADMIN — SCOLOPAMINE TRANSDERMAL SYSTEM 1 PATCH: 1 PATCH, EXTENDED RELEASE TRANSDERMAL at 08:14

## 2021-08-04 RX ADMIN — GLYCOPYRROLATE 0.4 MG: 0.2 INJECTION, SOLUTION INTRAMUSCULAR; INTRAVENOUS at 09:09

## 2021-08-04 RX ADMIN — FENTANYL CITRATE 50 MCG: 50 INJECTION, SOLUTION INTRAMUSCULAR; INTRAVENOUS at 09:10

## 2021-08-04 RX ADMIN — PROPOFOL 200 MCG/KG/MIN: 10 INJECTION, EMULSION INTRAVENOUS at 08:38

## 2021-08-04 NOTE — DISCHARGE INSTRUCTIONS
You received 1,000 mg of acetaminophen (Tylenol) at 8:13 AM. Do not exceed 4,000 mg of acetaminophen during a 24 hour period and keep in mind that acetaminophen can also be found in many over-the-counter cold medications as well as narcotics that may be given for pain.     You received a medication called Toradol (a stronger IV ibuprofen) at 9:11 AM. Do NOT take any Ibuprofen / Advil / Aleve / Naproxen or products containing Ibuprofen until 3:11 PM or later.    Please remove Scopalamine patch, placed behind one of your ears prior to surgery, within 72 hours after your procedure. This patch is used to treat nausea. Wash your hands thoroughly after patch removal and do not touch eyes afterwards.    If you have any questions or concerns regarding your procedure, please contact Dr. Kauffman, their office number is [phone].

## 2021-08-04 NOTE — ANESTHESIA PREPROCEDURE EVALUATION
Anesthesia Pre-Procedure Evaluation    Patient: Leny Jeffrey   MRN: [id number] : 1980        Preoperative Diagnosis: Unwanted fertility [Z30.09]   Procedure : Procedure(s):  SALPINGECTOMY, LAPAROSCOPIC     Past Medical History:   Diagnosis Date     Abnormal Pap smear of cervix     Multiple, LEEP ; wnl 2016     Arthritis     Low Back     Migraines      Motion sickness       Past Surgical History:   Procedure Laterality Date     BIOPSY CERVICAL, LOCAL EXCISION, SINGLE/MULTIPLE      CIN2, Path report showed margins were not clear but pap smear 6 months later was normal     HC REPAIR OF NASAL SEPTUM      Description: Septoplasty;  Recorded: 2008;     WISDOM TOOTH EXTRACTION        No Known Allergies   Social History     Tobacco Use     Smoking status: Never Smoker     Smokeless tobacco: Never Used   Substance Use Topics     Alcohol use: Yes     Comment: Rarely      Wt Readings from Last 1 Encounters:   21 69.4 kg (153 lb)        Anesthesia Evaluation   Pt has not had prior anesthetic         ROS/MED HX  ENT/Pulmonary:  - neg pulmonary ROS     Neurologic:     (+) migraines,     Cardiovascular:       METS/Exercise Tolerance: >4 METS    Hematologic:  - neg hematologic  ROS     Musculoskeletal:   (+) arthritis,     GI/Hepatic:  - neg GI/hepatic ROS     Renal/Genitourinary:  - neg Renal ROS     Endo:  - neg endo ROS     Psychiatric/Substance Use:  - neg psychiatric ROS     Infectious Disease:  - neg infectious disease ROS     Malignancy:  - neg malignancy ROS     Other:     (-) Any chance pregnant       Physical Exam    Airway        Mallampati: II   TM distance: > 3 FB   Neck ROM: full     Respiratory Devices and Support         Dental  no notable dental history         Cardiovascular   cardiovascular exam normal          Pulmonary   pulmonary exam normal                OUTSIDE LABS:  CBC:   Lab Results   Component Value Date    WBC 4.5 2021    WBC 7.3 2020    HGB 14.9 2021     HGB 15.1 06/11/2020    HCT 42.3 05/07/2021    HCT 44.4 06/11/2020     05/07/2021     06/11/2020     BMP:   Lab Results   Component Value Date     12/17/2019     12/14/2018    POTASSIUM 3.7 12/17/2019    POTASSIUM 4.0 12/14/2018    CHLORIDE 106 12/17/2019    CHLORIDE 103 12/14/2018    CO2 25 12/17/2019    CO2 28 12/14/2018    BUN 10 12/17/2019    BUN 11 12/14/2018    CR 1.06 05/07/2021    CR 1.00 06/11/2020    GLC 81 12/17/2019    GLC 88 12/14/2018     COAGS:   Lab Results   Component Value Date    PTT 31 12/14/2018    INR 1.02 12/14/2018     POC:   Lab Results   Component Value Date    HCG Negative 01/29/2021     HEPATIC:   Lab Results   Component Value Date    ALBUMIN 4.1 12/14/2018    PROTTOTAL 7.0 12/14/2018    ALT 17 05/07/2021    AST 26 05/07/2021    ALKPHOS 59 12/14/2018    BILITOTAL 0.7 12/14/2018     OTHER:   Lab Results   Component Value Date    BETSY 9.7 12/17/2019    TSH 1.46 05/07/2021    CRP 8.1 (H) 05/07/2021    SED 9 05/07/2021       Anesthesia Plan    ASA Status:  2   NPO Status:  NPO Appropriate    Anesthesia Type: General.     - Airway: ETT   Induction: Intravenous.   Maintenance: TIVA.        Consents    Anesthesia Plan(s) and associated risks, benefits, and realistic alternatives discussed. Questions answered and patient/representative(s) expressed understanding.     - Discussed with:  Patient         Postoperative Care    Pain management: IV analgesics, Oral pain medications, Multi-modal analgesia.   PONV prophylaxis: Ondansetron (or other 5HT-3), Dexamethasone or Solumedrol, Scopolamine patch     Comments:                Jocelin Garrett MD

## 2021-08-04 NOTE — ANESTHESIA POSTPROCEDURE EVALUATION
Patient: Leny Jeffrey    Procedure(s):  SALPINGECTOMY, LAPAROSCOPIC    Diagnosis:Unwanted fertility [Z30.09]  Diagnosis Additional Information: No value filed.    Anesthesia Type:  General    Note:  Disposition: Outpatient   Postop Pain Control: Uneventful            Sign Out: Well controlled pain   PONV: No   Neuro/Psych: Uneventful            Sign Out: Acceptable/Baseline neuro status   Airway/Respiratory: Uneventful            Sign Out: Acceptable/Baseline resp. status   CV/Hemodynamics: Uneventful            Sign Out: Acceptable CV status; No obvious hypovolemia; No obvious fluid overload   Other NRE: NONE   DID A NON-ROUTINE EVENT OCCUR?            Last vitals:  Vitals Value Taken Time   /49 08/04/21 0945   Temp 97.4  F (36.3  C) 08/04/21 0945   Pulse 74 08/04/21 0945   Resp 16 08/04/21 0945   SpO2 97 % 08/04/21 0945       Electronically Signed By: Jocelin Garrett MD  August 4, 2021  11:20 AM

## 2021-08-04 NOTE — OP NOTE
DATE OF SERVICE: 8/4/21     PREOPERATIVE DIAGNOSIS: unwanted fertility    POSTOPERATIVE DIAGNOSIS: same    PROCEDURE: diagnostic laparoscopy, bilateral salpingectomy     SURGEON:  Zahira Kauffman     FINDINGS: normal pelvis and upper abdomen    ANESTHESIA: general endotracheal    ESTIMATED BLOOD LOSS: 5 cc    SPECIMENS: bilateral tubes    DRAINS: none    COMPLICATIONS: none    Brief History:  The patient is a 39 yo  who knows she doesn't want any more pregnancies.   She was counseled on the permanence of the procedure, the approximately 1/200 failure rate, and the increased risk for ectopic should pregnancy occur.  She was counseled on the risks, benefits and alternatives prior to surgery.    Procedure:  The patient was brought to the operating room where general anesthesia was obtained.  She was placed in dorsal lithotomy position.  Exam under anesthesia revealed a mid position uterus with normal adnexa bilaterally.  She was prepped and draped in the usual sterile fashion.  Time out was done confirming the patient and the procedure.  Burnette catheter was inserted.  Speculum was inserted.  Skein's tenaculum was placed on the anterior lip of the cervix.  The cervix was atraumatically dilated.  Uterus sounded to 8 cm.  Humie uterine manipulator was placed, tenaculum and speculum were removed.  Gloves were changed and attention was directed to the abdomen.    Scalpel was used to make a small incision just below the umbilicus. Verees needles was inserted in the open position, there was a normal saline drip test and carbon dioxide insufflation was begun.  After adequate insufflation was obtained, the Verees was removed and a 5 mm bladeless trocar was inserted with confirmation of entry into the peritoneal cavity.  Two additional 5 mm trocars were placed, one 2 cm above the pubic symphasis in the midline and one in the right lower quadrant, both under direct visualization with good hemostasis noted.  The left tube was  identified to its fimbriated end.  Ligasure cautery was used to excise the tube.  Similar procedure was carried out on the right fallopian tube after identifying it to the fimbriated end.  Good hemostasis was noted.  The upper abdomen appeared normal.  The accessory trocars were removed under direct visualization with good hemostasis noted.  Carbon dioxide was released from the patient's abdomen.  Main trocar was then removed.  Skin was closed with subcuticular stitches of 4-0 Vicryl.   Humie and campbell were removed.  She was returned to supine position and brought to the recovery room in stable condition.  Sponge and sharp counts were correct.  She is expected to go home on the day of the procedure.

## 2021-08-04 NOTE — ANESTHESIA PROCEDURE NOTES
Airway       Patient location during procedure: OR       Procedure Start/Stop Times: 8/4/2021 8:42 AM  Staff -        Anesthesiologist:  Jocelin Garrett MD       CRNA: Linda Ryan APRN CRNA       Performed By: CRNA  Consent for Airway        Urgency: elective  Indications and Patient Condition       Indications for airway management: shashi-procedural         Mask difficulty assessment: 1 - vent by mask    Final Airway Details       Final airway type: endotracheal airway       Successful airway: ETT - single  Endotracheal Airway Details        ETT size (mm): 7.0       Cuffed: yes       Successful intubation technique: direct laryngoscopy       DL Blade Type: Cueva 2       Grade View of Cords: 1       Adjucts: stylet and tooth guard       Position: Right       Secured at (cm): 21       Bite block used: Oral Airway (at end of case)    Post intubation assessment        Placement verified by: capnometry, equal breath sounds and chest rise        Number of attempts at approach: 1       Number of other approaches attempted: 0       Secured with: silk tape       Ease of procedure: easy       Dentition: Intact and Unchanged

## 2021-08-04 NOTE — ANESTHESIA CARE TRANSFER NOTE
Patient: Leny Jeffrey    Procedure(s):  SALPINGECTOMY, LAPAROSCOPIC    Diagnosis: Unwanted fertility [Z30.09]  Diagnosis Additional Information: No value filed.    Anesthesia Type:   General     Note:    Oropharynx: oropharynx clear of all foreign objects  Level of Consciousness: awake  Oxygen Supplementation: face mask  Level of Supplemental Oxygen (L/min / FiO2): 6  Independent Airway: airway patency satisfactory and stable  Dentition: dentition unchanged  Vital Signs Stable: post-procedure vital signs reviewed and stable  Report to RN Given: handoff report given  Patient transferred to: PACU    Handoff Report: Identifed the Patient, Identified the Reponsible Provider, Reviewed the pertinent medical history, Discussed the surgical course, Reviewed Intra-OP anesthesia mangement and issues during anesthesia, Set expectations for post-procedure period and Allowed opportunity for questions and acknowledgement of understanding      Vitals:  Vitals Value Taken Time   /55 08/04/21 0926   Temp 97.6  F (36.4  C) 08/04/21 0926   Pulse 97 08/04/21 0926   Resp 20 08/04/21 0926   SpO2 97 % 08/04/21 0926       Electronically Signed By: SKY Mccray CRNA  August 4, 2021  9:29 AM

## 2021-08-12 ENCOUNTER — OFFICE VISIT (OUTPATIENT)
Dept: OBGYN | Facility: CLINIC | Age: 41
End: 2021-08-12
Payer: COMMERCIAL

## 2021-08-12 VITALS
HEIGHT: 64 IN | BODY MASS INDEX: 26.12 KG/M2 | HEART RATE: 64 BPM | WEIGHT: 153 LBS | DIASTOLIC BLOOD PRESSURE: 62 MMHG | SYSTOLIC BLOOD PRESSURE: 116 MMHG

## 2021-08-12 DIAGNOSIS — Z98.890 POST-OPERATIVE STATE: Primary | ICD-10-CM

## 2021-08-12 PROCEDURE — 99024 POSTOP FOLLOW-UP VISIT: CPT | Performed by: OBSTETRICS & GYNECOLOGY

## 2021-08-12 ASSESSMENT — MIFFLIN-ST. JEOR: SCORE: 1349

## 2021-08-12 NOTE — PROGRESS NOTES
"S:  Pt is here for a post-op visit following laparoscopic bilateral salpingectomy.  Her surgery was on 8/4/21.  She has been feeling well overall.  Her complaints are a little bruising at her suprapubic incision.  Pain is gone.    O: /62 (BP Location: Right arm, Patient Position: Sitting, Cuff Size: Adult Small)   Pulse 64   Ht 1.626 m (5' 4\")   Wt 69.4 kg (153 lb)   LMP 07/17/2021 (Approximate)    Body mass index is 26.26 kg/m .    Abdomen: incisions well healed x3, ecchymosis on suprapubic site    A:  Normal post-op check    P:  Surgical findings and pictures discussed with patient.  Questions answered.  Activity and restrictions discussed with pt.  Follow up with Dr Liu for regular care.    "

## 2021-09-08 ENCOUNTER — DOCUMENTATION ONLY (OUTPATIENT)
Dept: OTHER | Facility: CLINIC | Age: 41
End: 2021-09-08

## 2021-09-23 ENCOUNTER — OFFICE VISIT (OUTPATIENT)
Dept: FAMILY MEDICINE | Facility: CLINIC | Age: 41
End: 2021-09-23
Payer: COMMERCIAL

## 2021-09-23 VITALS
SYSTOLIC BLOOD PRESSURE: 100 MMHG | BODY MASS INDEX: 26.14 KG/M2 | OXYGEN SATURATION: 99 % | RESPIRATION RATE: 16 BRPM | HEART RATE: 88 BPM | HEIGHT: 64 IN | DIASTOLIC BLOOD PRESSURE: 64 MMHG | WEIGHT: 153.1 LBS

## 2021-09-23 DIAGNOSIS — F33.40 RECURRENT MAJOR DEPRESSIVE DISORDER, IN REMISSION (H): ICD-10-CM

## 2021-09-23 DIAGNOSIS — R22.1 LOCALIZED SWELLING, MASS AND LUMP, NECK: Primary | ICD-10-CM

## 2021-09-23 PROBLEM — F33.9 MAJOR DEPRESSION, RECURRENT (H): Status: ACTIVE | Noted: 2021-09-23

## 2021-09-23 PROCEDURE — 99213 OFFICE O/P EST LOW 20 MIN: CPT | Performed by: FAMILY MEDICINE

## 2021-09-23 ASSESSMENT — PATIENT HEALTH QUESTIONNAIRE - PHQ9: SUM OF ALL RESPONSES TO PHQ QUESTIONS 1-9: 4

## 2021-09-23 ASSESSMENT — MIFFLIN-ST. JEOR: SCORE: 1344.46

## 2021-09-23 NOTE — PATIENT INSTRUCTIONS
Healthy Diet    From the American Heart Association:  https://www.heart.org/en/healthy-living/healthy-eating     Conquer cravings with substitutions:  https://www.heart.org/en/healthy-living/healthy-eating/losing-weight/conquer-cravings-with-these-healthy-substitutions    The ultimate Arthritis diet (from the [employer])  https://www.arthritis.org/health-wellness/healthy-living/nutrition/anti-inflammatory/the-ultimate-arthritis-diet      Diet for Weight loss -f rom the American Heart Association website  5 Steps to Lose Weight and Keep It Off  American Heart Association  Set realistic goals.  Know where you are today so you know how to get where you want to be. Learn your Body Mass Index (BMI). Set yourself up for success with short-term goals, like  I will make lifestyle changes which will help me lose (and keep off) 3-5% of my body weight.  Short-term goals can seem more achievable and keep you on track toward your long-term goals.      Understand how much and why you eat.  Use a food diary or tracking allan to understand what, how much, and when you re eating. Being mindful of your eating habits and aware of your roadblocks and excuses can help you get real about your goals.      Manage portion sizes.  It s easy to overeat when you re served too much food. Smaller portions can help prevent eating too much. Learn the difference between a portion and a serving and how to keep portions reasonable.      Make smart choices.  You don t have to give up all your favorite foods. Learn to make smart food choices and simple substitutions instead. Discover healthy snacks and how fruits, vegetables, and whole grains help keep you guan longer.   Yes

## 2021-09-23 NOTE — PROGRESS NOTES
Assessment and Plan    Localized swelling, mass and lump, neck  No red flags, but annoying and Leny would like a more definitive diagnosis  - US Head Neck Soft Tissue    Recurrent major depressive disorder, in remission (H)  Well controlled on: bupropion 0- I can refill this in the next year as needed      ALSO - multiple joint pains, rheumatology work up negative, functional medicine approach/supplements not helpful so far.  I cannot add much to what had already been done, but I did mention arthritis diet and trying to stop night-shades - not a proven intervention, but it does seem to help in some people.  This is part of my diet hand out below     Patient Instructions   Healthy Diet    From the American Heart Association:  https://www.heart.org/en/healthy-living/healthy-eating     Conquer cravings with substitutions:  https://www.heart.org/en/healthy-living/healthy-eating/losing-weight/conquer-cravings-with-these-healthy-substitutions    The ultimate Arthritis diet (from the [employer])  https://www.arthritis.org/health-wellness/healthy-living/nutrition/anti-inflammatory/the-ultimate-arthritis-diet      Diet for Weight loss -f rom the American Heart Association website  5 Steps to Lose Weight and Keep It Off  American Heart Association  Set realistic goals.  Know where you are today so you know how to get where you want to be. Learn your Body Mass Index (BMI). Set yourself up for success with short-term goals, like  I will make lifestyle changes which will help me lose (and keep off) 3-5% of my body weight.  Short-term goals can seem more achievable and keep you on track toward your long-term goals.      Understand how much and why you eat.  Use a food diary or tracking allan to understand what, how much, and when you re eating. Being mindful of your eating habits and aware of your roadblocks and excuses can help you get real about your goals.      Manage portion sizes.  It s easy to overeat when  you re served too much food. Smaller portions can help prevent eating too much. Learn the difference between a portion and a serving and how to keep portions reasonable.      Make smart choices.  You don t have to give up all your favorite foods. Learn to make smart food choices and simple substitutions instead. Discover healthy snacks and how fruits, vegetables, and whole grains help keep you guan longer.       Return in 8 months (on 5/23/2022) for annual exam, or earlier as needed.    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 Massage therapist says it is to normal. It does not hurt on its own, only with pressure. Just doesn't feel tight. More recently she notes that she can feel it when she is not touching it.  She has had not injury      Leny feels chronically stressed (work, medical issues) though technically depressed is controlled.  She is not exercising much      Disp Refills Start End BECCA    buPROPion (WELLBUTRIN XL) 300 MG 24 hr tablet 90 tablet 3 1/7/2021  No   Sig - Route: Take 1 tablet (300 mg total) by mouth every morning. - Oral   Sent to pharmacy as: buPROPion HCL  mg 24 hr tablet, extended release (Wellbutrin XL)   E-Prescribing Status: Receipt confirmed by pharmacy (1/7/2021  1:42 PM CST)     PHQ 1/7/2021 6/15/2021 9/23/2021   PHQ-9 Total Score 11 - 4   Q9: Thoughts of better off dead/self-harm past 2 weeks Not at all (No Data) Not at all   F/U: Thoughts of suicide or self-harm - - -   F/U: Safety concerns - - -       Migraines - sees Dr. Lynette Mcdaniels    concerns for join pains and auto immune issues   - As Leny had expressed interests in diet and supplements as a route to addressing issues,  I had recommended she see my  "colleague Dr. Her who is knowledgeable in functional medicine. LILIA Her did a lot of test including a high sensitivity CRP, and has her on supplements, but nothing is helping so far   - Leny is \"at wits end\" with rheumatology - labs are normal, no organ damage   - Leny had wondered whether ParaGard IUD was causing inflammation.  She had this taken out - no change. Kylena caused irregular bleeding   - back pain is bad - she does see an orthopedic specialist who ordered a pelvic MRI to look at hips/MRI - she has not done this yet      Current Outpatient Medications   Medication     acetaminophen (TYLENOL) 325 MG tablet     buPROPion (WELLBUTRIN XL) 300 MG 24 hr tablet     cholecalciferol 50 MCG (2000 UT) tablet     ferrous sulfate (FEROSUL) 325 (65 Fe) MG tablet     fish oil-omega-3 fatty acids 1000 MG capsule     ibuprofen (ADVIL/MOTRIN) 800 MG tablet     magnesium 200 MG TABS     Multiple Vitamins-Minerals (MULTIVITAMIN ADULTS PO)     ondansetron (ZOFRAN-ODT) 4 MG ODT tab     prochlorperazine (COMPAZINE) 5 MG tablet     rizatriptan (MAXALT-MLT) 5 MG ODT     SUMAtriptan (IMITREX) 50 MG tablet     topiramate (TOPAMAX) 25 MG tablet     No current facility-administered medications for this visit.         Health Maintenance Due   Topic Date Due     ANNUAL REVIEW OF  ORDERS  Never done     INFLUENZA VACCINE (1) 09/01/2021     Health Maintenance reviewed - .    The history section was last reviewed by Megan Herrera LPN on Sep 23, 2021.    History   Smoking Status     Never Smoker   Smokeless Tobacco     Never Used       Social History    Substance and Sexual Activity      Alcohol use: Yes        Comment: Rarely        /64 (BP Location: Left arm, Patient Position: Sitting, Cuff Size: Adult Regular)   Pulse 88   Resp 16   Ht 1.626 m (5' 4\")   Wt 69.4 kg (153 lb 1.6 oz)   LMP 08/25/2021   SpO2 99%   BMI 26.28 kg/m    Body mass index is 26.28 kg/m .     EXAM:    General appearance - alert, well " appearing, and in no distress  Mental status - frustrated, anxious; normal speech, thought process and content  Neck - amorphus lump finger tip sized between planes of posteior right neck support muscles

## 2021-09-24 ENCOUNTER — VIRTUAL VISIT (OUTPATIENT)
Dept: NEUROLOGY | Facility: CLINIC | Age: 41
End: 2021-09-24
Payer: COMMERCIAL

## 2021-09-24 DIAGNOSIS — G43.719 INTRACTABLE CHRONIC MIGRAINE WITHOUT AURA AND WITHOUT STATUS MIGRAINOSUS: ICD-10-CM

## 2021-09-24 DIAGNOSIS — G43.009 MIGRAINE WITHOUT AURA AND WITHOUT STATUS MIGRAINOSUS, NOT INTRACTABLE: Primary | ICD-10-CM

## 2021-09-24 PROCEDURE — 99203 OFFICE O/P NEW LOW 30 MIN: CPT | Mod: 95 | Performed by: NURSE PRACTITIONER

## 2021-09-24 RX ORDER — TOPIRAMATE 100 MG/1
100 TABLET, FILM COATED ORAL AT BEDTIME
Qty: 90 TABLET | Refills: 2 | Status: SHIPPED | OUTPATIENT
Start: 2021-09-24 | End: 2022-06-21

## 2021-09-24 RX ORDER — PROMETHAZINE HYDROCHLORIDE 25 MG/1
12.5-25 TABLET ORAL EVERY 6 HOURS PRN
Qty: 20 TABLET | Refills: 3 | Status: SHIPPED | OUTPATIENT
Start: 2021-09-24 | End: 2023-02-10

## 2021-09-24 RX ORDER — RIZATRIPTAN BENZOATE 5 MG/1
5-10 TABLET, ORALLY DISINTEGRATING ORAL
Qty: 18 TABLET | Refills: 6 | Status: SHIPPED | OUTPATIENT
Start: 2021-09-24 | End: 2023-02-10

## 2021-09-24 NOTE — LETTER
9/24/2021       RE: [facility] Jeffrey  [address] [facility] [address]     Dear Colleague,    Thank you for referring your patient, Leny Jeffrey, to the [facility] [place] at [facility]. Please see a copy of my visit note below.    Video Start Time: MIGRAINE DISABILITY ASSESSMENT (MIDAS)    On how many days in the last 3 months did you miss work or school because of your headaches?  0    How many days in the last 3 months was your productivity at work or school reduced by half or more because of your headaches? (Do not include days you counted in question 1 where you missed work or school.)  2    On how many days in the last 3 months did you not do household work (such as housework, home repairs and maintenance, shopping, caring for children and relatives) because of your headaches?  2    How many days in the last 3 months was your productivity in household work reduced by half or more because of your headaches? (Do not include days you counted in question 3 where you did not do household work).  2    On how many days in the last 3 months did you miss family, social, or lesiure activities because of your headaches?  0    MIDAS Total Score: 4    On how many days in the last 3 months did you have a headache? (If a headache lasted more than 1 day, count each day.)   20    On a scale of 0 - 10, on average how painful were these headaches (where 0 = no pain at all, and 10 = pain as bad as it can be.)  4    Last Botox 7/20/2021  Topiramate helped to lose weight and exercises a lot and helping with headaches.   Elective Gyn surgery in August -headaches increased.   Topiramate 100 mg at bedtime and headaches have been manageable. No side effects.   Weight was stable.   About 20 in the last 90 days and before that 1-2 per week.   Reports that excedrin would help more than sumatriptan -causes nausea.   About 1-2 times per week mild headache and  "about once or twice per month more of the migraine headache-nausea and eye pain.   Headaches usually early in the morning lasting a couple of hours with Excedrin but still has residual symptoms.   Headaches typically less than 15 per month but more this month but wasn't able to exercise.     Treatments Tried:   Duloxetine for mood and generalized pain and did not help with pain and caused hyper hydrosis and has been decreasing it  Wellbutrin just started on Nov 6th    Amitriptyline did not help  Prednisone trial in July and did not help with headaches and pain and off  Sumatriptan helps and avoids because causes \"nausea\" and may be used 5 of the tablets for the past year  Rizatriptan -used a long time ago  Sumatriptan injection-costly and never picked it up  Compazine did not help  Excedrin    Reviewed CGRPs -Emgality as preventative options.     Plan:  Headache log  If 15 or more per month-may consider adding monthly injections with Emgality   Try rizatriptan -limit use to no more than 9 days per month  Promethazine as needed for nausea and motion sickness  Excedrin limit use to no more than 10 days per month  Exercise renew  Stay hydrated  Follow up in 9-12 months or sooner if needed     allergies and current prescription medications reviewed    Patient is alert and no in apparent acute distress,  mentation appears normal, judgement and insight intact, normal speech.      I discussed all my recommendations with Leny Jeffrey who verbalizes understanding and comfortable with the plan.  All of patient's questions were answered from the best of my knowledge.  Patient is in agreement with the plan.     31  minutes spent on the date of the encounter doing video access, chart  review, meds review, treatment plan, documentation and further activities as noted above    SKY Espino, CNP [facility]  Headache certified  [facility]          Again, thank you for allowing me to participate in the care of your " patient.      Sincerely,    SKY Mcdonough CNP

## 2021-09-24 NOTE — PROGRESS NOTES
Leny is a 41 year old who is being evaluated via a billable video visit.      How would you like to obtain your AVS? MyChart  If the video visit is dropped, the invitation should be resent by: Text to cell phone: [phone]   Will anyone else be joining your video visit? No      Video Start Time: MIGRAINE DISABILITY ASSESSMENT (MIDAS)    On how many days in the last 3 months did you miss work or school because of your headaches?  0    How many days in the last 3 months was your productivity at work or school reduced by half or more because of your headaches? (Do not include days you counted in question 1 where you missed work or school.)  2    On how many days in the last 3 months did you not do household work (such as housework, home repairs and maintenance, shopping, caring for children and relatives) because of your headaches?  2    How many days in the last 3 months was your productivity in household work reduced by half or more because of your headaches? (Do not include days you counted in question 3 where you did not do household work).  2    On how many days in the last 3 months did you miss family, social, or lesiure activities because of your headaches?  0    MIDAS Total Score: 4    On how many days in the last 3 months did you have a headache? (If a headache lasted more than 1 day, count each day.)   20    On a scale of 0 - 10, on average how painful were these headaches (where 0 = no pain at all, and 10 = pain as bad as it can be.)  4  Video-Visit Details    Type of service:  Video Visit    Video Start Time 7:33 AM    Video End Time:8:03 AM    Originating Location (pt. Location): Home    Distant Location (provider location):  [facility] [place]     [facility]       Last Botox 7/20/2021  Topiramate helped to lose weight and exercises a lot and helping with headaches.   Elective Gyn surgery in August -headaches increased.   Topiramate 100 mg at  "bedtime and headaches have been manageable. No side effects.   Weight was stable.   About 20 in the last 90 days and before that 1-2 per week.   Reports that excedrin would help more than sumatriptan -causes nausea.   About 1-2 times per week mild headache and about once or twice per month more of the migraine headache-nausea and eye pain.   Headaches usually early in the morning lasting a couple of hours with Excedrin but still has residual symptoms.   Headaches typically less than 15 per month but more this month but wasn't able to exercise.     Treatments Tried:   Duloxetine for mood and generalized pain and did not help with pain and caused hyper hydrosis and has been decreasing it  Wellbutrin just started on Nov 6th    Amitriptyline did not help  Prednisone trial in July and did not help with headaches and pain and off  Sumatriptan helps and avoids because causes \"nausea\" and may be used 5 of the tablets for the past year  Rizatriptan -used a long time ago  Sumatriptan injection-costly and never picked it up  Compazine did not help  Excedrin    Reviewed CGRPs -Emgality as preventative options.     Plan:  Headache log  If 15 or more per month-may consider adding monthly injections with Emgality   Try rizatriptan -limit use to no more than 9 days per month  Promethazine as needed for nausea and motion sickness  Excedrin limit use to no more than 10 days per month  Exercise renew  Stay hydrated  Follow up in 9-12 months or sooner if needed     allergies and current prescription medications reviewed    Patient is alert and no in apparent acute distress,  mentation appears normal, judgement and insight intact, normal speech.      I discussed all my recommendations with Leny Jeffrey who verbalizes understanding and comfortable with the plan.  All of patient's questions were answered from the best of my knowledge.  Patient is in agreement with the plan.     31  minutes spent on the date of the encounter doing video " access, chart  review, meds review, treatment plan, documentation and further activities as noted above    SKY Espino, CNP [facility]  Headache certified  [facility]

## 2021-09-24 NOTE — PATIENT INSTRUCTIONS
Plan:  Headache log  If 15 or more per month-may consider adding monthly injections with Emgality   Try rizatriptan -limit use to no more than 9 days per month  Promethazine as needed for nausea and motion sickness  Excedrin limit use to no more than 10 days per month  Exercise renew  Stay hydrated  Follow up in 9-12 months or sooner if needed

## 2021-09-25 ENCOUNTER — HOSPITAL ENCOUNTER (OUTPATIENT)
Dept: ULTRASOUND IMAGING | Facility: CLINIC | Age: 41
End: 2021-09-25
Attending: FAMILY MEDICINE
Payer: COMMERCIAL

## 2021-09-25 DIAGNOSIS — R22.1 LOCALIZED SWELLING, MASS AND LUMP, NECK: ICD-10-CM

## 2021-09-25 PROCEDURE — 76536 US EXAM OF HEAD AND NECK: CPT

## 2021-09-30 ENCOUNTER — DOCUMENTATION ONLY (OUTPATIENT)
Dept: OTHER | Facility: CLINIC | Age: 41
End: 2021-09-30

## 2021-10-08 ENCOUNTER — MYC MEDICAL ADVICE (OUTPATIENT)
Dept: FAMILY MEDICINE | Facility: CLINIC | Age: 41
End: 2021-10-08

## 2021-10-08 DIAGNOSIS — M79.7 FIBROMYALGIA: Primary | ICD-10-CM

## 2021-10-09 ENCOUNTER — ANCILLARY PROCEDURE (OUTPATIENT)
Dept: MRI IMAGING | Facility: CLINIC | Age: 41
End: 2021-10-09
Attending: INTERNAL MEDICINE
Payer: COMMERCIAL

## 2021-10-09 DIAGNOSIS — M54.50 CHRONIC BILATERAL LOW BACK PAIN WITHOUT SCIATICA: ICD-10-CM

## 2021-10-09 DIAGNOSIS — G89.29 CHRONIC BILATERAL LOW BACK PAIN WITHOUT SCIATICA: ICD-10-CM

## 2021-10-09 PROCEDURE — 72195 MRI PELVIS W/O DYE: CPT | Performed by: RADIOLOGY

## 2021-10-10 ENCOUNTER — HEALTH MAINTENANCE LETTER (OUTPATIENT)
Age: 41
End: 2021-10-10

## 2021-10-18 ENCOUNTER — MYC MEDICAL ADVICE (OUTPATIENT)
Dept: FAMILY MEDICINE | Facility: CLINIC | Age: 41
End: 2021-10-18

## 2021-10-18 DIAGNOSIS — M79.7 FIBROMYALGIA: Primary | ICD-10-CM

## 2021-11-30 ENCOUNTER — OFFICE VISIT (OUTPATIENT)
Dept: FAMILY MEDICINE | Facility: CLINIC | Age: 41
End: 2021-11-30
Payer: COMMERCIAL

## 2021-11-30 VITALS
DIASTOLIC BLOOD PRESSURE: 80 MMHG | SYSTOLIC BLOOD PRESSURE: 102 MMHG | WEIGHT: 154.8 LBS | BODY MASS INDEX: 26.57 KG/M2 | HEART RATE: 88 BPM | RESPIRATION RATE: 16 BRPM | TEMPERATURE: 98.3 F

## 2021-11-30 DIAGNOSIS — M79.7 FIBROMYALGIA: ICD-10-CM

## 2021-11-30 DIAGNOSIS — M54.9 BILATERAL BACK PAIN, UNSPECIFIED BACK LOCATION, UNSPECIFIED CHRONICITY: Primary | ICD-10-CM

## 2021-11-30 LAB — CRP SERPL HS-MCNC: 0.7 MG/L (ref 0–3)

## 2021-11-30 PROCEDURE — 86141 C-REACTIVE PROTEIN HS: CPT | Performed by: FAMILY MEDICINE

## 2021-11-30 PROCEDURE — 36415 COLL VENOUS BLD VENIPUNCTURE: CPT | Performed by: FAMILY MEDICINE

## 2021-11-30 PROCEDURE — 99213 OFFICE O/P EST LOW 20 MIN: CPT | Performed by: FAMILY MEDICINE

## 2021-11-30 ASSESSMENT — PATIENT HEALTH QUESTIONNAIRE - PHQ9
SUM OF ALL RESPONSES TO PHQ QUESTIONS 1-9: 3
SUM OF ALL RESPONSES TO PHQ QUESTIONS 1-9: 3
10. IF YOU CHECKED OFF ANY PROBLEMS, HOW DIFFICULT HAVE THESE PROBLEMS MADE IT FOR YOU TO DO YOUR WORK, TAKE CARE OF THINGS AT HOME, OR GET ALONG WITH OTHER PEOPLE: NOT DIFFICULT AT ALL

## 2021-11-30 NOTE — PATIENT INSTRUCTIONS
To schedule a complementary 30 minute consult for the ALCAT test go to: [email or web address]  To download a guide for the ALCAT results go to: [email or web address]    I recommend the following company for CBD oil:  [employer].  Product is Daily Balance CBD oil capsules 15 mg - Take one two times a day.      Get your cholesterol checked at your next physical

## 2021-11-30 NOTE — PROGRESS NOTES
Leny was seen today for follow up.    Diagnoses and all orders for this visit:    Bilateral back pain, unspecified back location, unspecified chronicity  -     C-Reactive Protein, High Sensitivity- recheck today  I think she needs to implement elimination diet.  Recommend starting with 30 min consult with ALCAT consultant then removed severe foods for 6 mo/ moderate foods for 3 mo and rotate mild foods.    Fibromyalgia- We discussed adding CBD oil to the Savella.  The elimination diet will also help.      Patient Instructions   To schedule a complementary 30 minute consult for the ALCAT test go to: [email or web address]  To download a guide for the ALCAT results go to: [facility]/guide    I recommend the following company for CBD oil:  [employer].  Product is Daily Balance CBD oil capsules 15 mg - Take one two times a day.      Get your cholesterol checked at your next physical       SUBJECTIVE: Leny Jeffrey is a 41 year old female who presents with the following:  Patient presents with:  Follow Up: Follow up on functional medicine.  Still having back pain, started new med Savella.  Other than that doing well.   She is here to f/u on low back pain/ migraines/ fibromyalgia / elevated hs CRP:    Has cut out sugar in diet.  Has not implemented elimination diet based on ALCAT testing.  Started on supplements - fish oil / magnesium / multivitamins.    Headaches fairly well managed.  Taking Topamax.      Started on Savella for fibromyalgia.  Has been taking it for 4 weeks now. Feels it has been somewhat helpful.  Has not been exercising as much.  Has had some depressed mood due to chronic medical issues.  Some stressors as daughter had bulemia/ son with ADHD in past year.    Had elective tubal surgery and feels recovered.    Still having lot of low back pain.  Had pelvic MRI recently and no sign of ankylosing spondylitis.  Seeing rheumatology - lupus suspected but labs have been normal. Still  has lot of pain in low back - L5/S1 area.    Mother is in her late 60s and just diagnosed with ASCVD.      OBJECTIVE: /80   Pulse 88   Temp 98.3  F (36.8  C) (Oral)   Resp 16   Wt 70.2 kg (154 lb 12.8 oz)   BMI 26.57 kg/m   no distress  GENERAL: Healthy, alert and no distress  EYES: Eyes grossly normal to inspection.  No discharge or erythema, or obvious scleral/conjunctival abnormalities.  RESP: No audible wheeze, cough, or visible cyanosis.  No visible retractions or increased work of breathing.    SKIN: Visible skin clear. No significant rash, abnormal pigmentation or lesions.  NEURO: Cranial nerves grossly intact.  Mentation and speech appropriate for age.  PSYCH: Mentation appears normal, affect normal/bright, judgement and insight intact, normal speech and appearance well-groomed.    HS CRP - 8.1      Answers for HPI/ROS submitted by the patient on 11/30/2021  If you checked off any problems, how difficult have these problems made it for you to do your work, take care of things at home, or get along with other people?: Not difficult at all  PHQ9 TOTAL SCORE: 3  How many servings of fruits and vegetables do you eat daily?: 2-3  On average, how many sweetened beverages do you drink each day (Examples: soda, juice, sweet tea, etc.  Do NOT count diet or artificially sweetened beverages)?: 0  How many minutes a day do you exercise enough to make your heart beat faster?: 30 to 60  How many days a week do you exercise enough to make your heart beat faster?: 3 or less  How many days per week do you miss taking your medication?: 0    Carmela Her MD

## 2021-12-01 ASSESSMENT — PATIENT HEALTH QUESTIONNAIRE - PHQ9: SUM OF ALL RESPONSES TO PHQ QUESTIONS 1-9: 3

## 2022-01-11 ENCOUNTER — TELEPHONE (OUTPATIENT)
Dept: FAMILY MEDICINE | Facility: CLINIC | Age: 42
End: 2022-01-11

## 2022-01-11 ENCOUNTER — OFFICE VISIT (OUTPATIENT)
Dept: FAMILY MEDICINE | Facility: CLINIC | Age: 42
End: 2022-01-11
Payer: COMMERCIAL

## 2022-01-11 VITALS
DIASTOLIC BLOOD PRESSURE: 60 MMHG | HEART RATE: 92 BPM | HEIGHT: 64 IN | SYSTOLIC BLOOD PRESSURE: 100 MMHG | BODY MASS INDEX: 26.73 KG/M2 | WEIGHT: 156.6 LBS | OXYGEN SATURATION: 99 %

## 2022-01-11 DIAGNOSIS — Z00.00 ANNUAL PHYSICAL EXAM: Primary | ICD-10-CM

## 2022-01-11 DIAGNOSIS — Q76.49 SACRALIZATION OF LUMBAR VERTEBRA: ICD-10-CM

## 2022-01-11 DIAGNOSIS — M53.3 SACRAL BACK PAIN: ICD-10-CM

## 2022-01-11 DIAGNOSIS — M79.7 FIBROMYALGIA: ICD-10-CM

## 2022-01-11 DIAGNOSIS — Z82.49 FAMILY HISTORY OF MI (MYOCARDIAL INFARCTION): ICD-10-CM

## 2022-01-11 DIAGNOSIS — Z13.220 SCREENING, LIPID: ICD-10-CM

## 2022-01-11 DIAGNOSIS — M47.816 LUMBAR SPONDYLOSIS: ICD-10-CM

## 2022-01-11 DIAGNOSIS — F33.40 RECURRENT MAJOR DEPRESSIVE DISORDER, IN REMISSION (H): ICD-10-CM

## 2022-01-11 LAB
CHOLEST SERPL-MCNC: 160 MG/DL
FASTING STATUS PATIENT QL REPORTED: YES
HDLC SERPL-MCNC: 50 MG/DL
LDLC SERPL CALC-MCNC: 94 MG/DL
TRIGL SERPL-MCNC: 81 MG/DL

## 2022-01-11 PROCEDURE — 99396 PREV VISIT EST AGE 40-64: CPT | Performed by: FAMILY MEDICINE

## 2022-01-11 PROCEDURE — 99214 OFFICE O/P EST MOD 30 MIN: CPT | Mod: 25 | Performed by: FAMILY MEDICINE

## 2022-01-11 PROCEDURE — 36415 COLL VENOUS BLD VENIPUNCTURE: CPT | Performed by: FAMILY MEDICINE

## 2022-01-11 PROCEDURE — 80061 LIPID PANEL: CPT | Performed by: FAMILY MEDICINE

## 2022-01-11 RX ORDER — POLYETHYLENE GLYCOL 3350 17 G/17G
1 POWDER, FOR SOLUTION ORAL DAILY PRN
COMMUNITY
End: 2023-01-13

## 2022-01-11 RX ORDER — BUPROPION HYDROCHLORIDE 300 MG/1
300 TABLET ORAL EVERY MORNING
Qty: 90 TABLET | Refills: 3 | Status: SHIPPED | OUTPATIENT
Start: 2022-01-11 | End: 2024-01-16 | Stop reason: ALTCHOICE

## 2022-01-11 ASSESSMENT — MIFFLIN-ST. JEOR: SCORE: 1360.33

## 2022-01-11 NOTE — TELEPHONE ENCOUNTER
Reason for Call:  Other call back    Detailed comments: pharmacy calling to clarify medication for:  Savella tab - directions    Phone Number Patient can be reached at: Other phone number:  [phone]    Best Time: anytime    Can we leave a detailed message on this number? YES    Call taken on 1/11/2022 at 10:21 AM by Sharlene Paulino

## 2022-01-11 NOTE — PROGRESS NOTES
"  ASSESSMENT/PLAN:   Leny was seen today for physical.    Diagnoses and all orders for this visit:    Annual physical exam    Recurrent major depressive disorder, in remission (H)  -     buPROPion (WELLBUTRIN XL) 300 MG 24 hr tablet; Take 1 tablet (300 mg) by mouth every morning    Fibromyalgia  -     milnacipran (SAVELLA) 25 MG TABS tablet; Take 1 tablet (25 mg) by mouth 2 times daily Then taper to 12.5 mg twice daily    Sacral back pain  -     Spine Referral; Future    Lumbar spondylosis  -     Spine Referral; Future    Sacralization of lumbar vertebra  -     Spine Referral; Future    Screening, lipid  -     Lipid Profile (Chol, Trig, HDL, LDL calc)    Family history of MI (myocardial infarction)  -     Lipid Profile (Chol, Trig, HDL, LDL calc)    Other orders  -     REVIEW OF HEALTH MAINTENANCE PROTOCOL ORDERS        Patient has been advised of split billing requirements and indicates understanding: Yes  COUNSELING:  Reviewed preventive health counseling, as reflected in patient instructions       Colon cancer screening    Estimated body mass index is 26.88 kg/m  as calculated from the following:    Height as of this encounter: 1.626 m (5' 4\").    Weight as of this encounter: 71 kg (156 lb 9.6 oz).    Weight management plan: she is getting back into exercise    She reports that she has never smoked. She has never used smokeless tobacco.      Megan Liu MD  [facility]     SUBJECTIVE:   CC: Leny Jeffrey is an 41 year old woman who presents for preventive health visit.       Patient has been advised of split billing requirements and indicates understanding: Yes  Healthy Habits:     Getting at least 3 servings of Calcium per day:  NO    Bi-annual eye exam:  Yes    Dental care twice a year:  Yes    Sleep apnea or symptoms of sleep apnea:  None    Diet:  Other    Frequency of exercise:  2-3 days/week    Duration of exercise:  15-30 minutes    Taking medications regularly:  " "Yes    Barriers to taking medications:  None    Medication side effects:  Other    PHQ-2 Total Score: 1    Additional concerns today:  No    Exercise - she has a treadmill - tries to do it as much as she can - 2 days a week    Reproductive Health fallopian tubes taken out in August of this year. She has a steady partner, no concerns about STI    Fibromyalgia - Leny has tired a number of medications to control symptoms, without success   -  sertraline and duloxetine  - horrible discontinuation   - Gabapentin didn't help either   - Tried tizanidine for headaches - that didn't help   - most recently she tried Savella - she says it boosted her mood a little, but was not really helpful for pain and she has dry mouth and constipation with this.  She would like to wean off Savella but would like to wait until mid-February when she come back from a trip    She was interested in other way of managing her symptoms, and I referred her to my colleague Dr. Carmela Her, a family physician trained in functional medicine.  She did a \"deep dive\" and ordered number of tests which did not reveal any other treatable risk factors. She educated Leny on antiinflammatory diet, and Leny has yet to try this.      Back pain: A couple of years ago had facet joint injection and lumbar injection - didn't help. She is also seeing Dr. Trevino  - Rheumatology - did pelvic MRI - didn't see anything inflammatory, but did see sacralization of L5 vertebrae, and Leny says \"that is  exactly where pain is \" - would like a referral to Spine Clinic and  to try cortisone injection    Here is her MRI result 10/9/21  Impression:  1. No evidence of inflammatory sacroiliitis.     2. Transitional lumbosacral anatomy with sacralized L5. Bony ankylosis  of the elongated left L5 transverse process with the sacrum. Elongated  right L5 transverse process with sacral pseudoarticulation (Castellvi  IIa on the right, IIIa on the left).     3. Lumbar " "spondylosis not well detailed without sagittal views.  Partial disc space loss L4-L5 with degenerative endplate edema within  the left inferior L4 endplate.     MITCH (Delmer OMON MD     Family history of hear disease - Mom went to ED for mild chest pain - told she had reflux and send home. Came back and insisted - found to have \"mild\" heart attack  angiogram showed a  maker blockage        Depression    PHQ 6/15/2021 9/23/2021 11/30/2021   PHQ-9 Total Score - 4 3   Q9: Thoughts of better off dead/self-harm past 2 weeks (No Data) Not at all Not at all   F/U: Thoughts of suicide or self-harm - - -   F/U: Safety concerns - - -     Social History: Works at [employer] - very busy. Things at home are good. Daughter is taking a gap year - at first worked at a barn - horse stables. 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 Yes        Social History     Tobacco Use     Smoking status: Never Smoker     Smokeless tobacco: Never Used   Substance Use Topics     Alcohol use: Yes     Comment: Rarely         Alcohol Use 1/11/2022   Prescreen: >3 drinks/day or >7 drinks/week? No       Reviewed orders with patient.  Reviewed health maintenance and updated orders accordingly - Yes      Breast Cancer Screening:    FHS-7:   Breast CA Risk Assessment (FHS-7) 1/11/2022   Did any of your first-degree relatives have breast or ovarian cancer? No   Did any of your relatives have bilateral breast cancer? No   Did any man in your family have breast cancer? No   Did any woman in your family have breast and ovarian cancer? No   Did any woman in your family have breast cancer before age 50 y? No   Do you have 2 or more relatives with breast and/or ovarian cancer? No   Do you have 2 or more relatives with breast " "and/or bowel cancer? Yes     Maternal grandmother  of colon cancer at age 75  Great aunt also had colon cancer in her 70s  Dad's sister has colon cancer in late 60s - doesn't keep in touch with that side of the family      History of abnormal Pap smear: NO - age 30-65 PAP every 5 years with negative HPV co-testing recommended  PAP / HPV Latest Ref Rng & Units 2019 7/15/2016 2015   PAP Negative for squamous intraepithelial lesion or malignancy. Negative for squamous intraepithelial lesion or malignancy  Electronically signed by Patricia Benitez CT (ASCP) on 2019 at  1:59 PM   Negative for squamous intraepithelial lesion or malignancy  Electronically signed by Malena Londono CT (ASCP) on 2016 at 10:17 AM   Negative for squamous intraepithelial lesion or malignancy  Electronically signed by Malena Londono CT (ASCP) on 2015 at  1:28 PM     HPV16 NEG Negative - -   HPV18 NEG Negative - -   HRHPV NEG Negative - -     Reviewed and updated as needed this visit by clinical staff  Tobacco  Allergies  Meds             Reviewed and updated as needed this visit by Provider                   Review of Systems  Constitutional: negative for recent illness or change in weight  Eyes: negative for irritation and vision change  Ears, nose, mouth, throat, and face: negative for nasal congestion and sore throat  Respiratory: negative for cough and dyspnea on exertion  Cardiovascular: negative for chest pain and palpitations  Gastrointestinal: negative for abdominal pain and change in bowel habits  Genitourinary:negative for dysuria, frequency and hesitancy  Integument/breast: negative for rash  Neurological: negative for dizziness, headaches and paresthesia       OBJECTIVE:   /60 (BP Location: Left arm, Patient Position: Sitting, Cuff Size: Adult Regular)   Pulse 92   Ht 1.626 m (5' 4\")   Wt 71 kg (156 lb 9.6 oz)   LMP 2021   SpO2 99%   BMI 26.88 kg/m    Physical " Exam      General appearance - alert, well appearing, and in no distress  Mental status - normal mood, behavior, speech, dress, motor activity, and thought processes  Eyes - deferred - just had normal eye exam  Ears - bilateral TM's and external ear canals normal  Mouth - mucous membranes moist, pharynx normal without lesions  Neck - supple, no significant adenopathy, carotids upstroke normal bilaterally, no bruits, thyroid exam: thyroid is normal in size without nodules or tenderness  Chest - clear to auscultation, no wheezes, rales or rhonchi, symmetric air entry  Heart - normal rate, regular rhythm, normal S1, S2, no murmurs, rubs, clicks or gallops  Abdomen - soft, nontender, nondistended, no masses or organomegaly  Breasts - breasts appear normal, no suspicious masses, no skin or nipple changes or axillary nodes  Neurological - alert, oriented, normal speech, no focal findings or movement disorder noted, DTR's normal and symmetric  Extremities - peripheral pulses normal, no pedal edema, no clubbing or cyanosis. Left foot plantar fibroma  Skin - no rashes or worrisome lesions

## 2022-02-28 NOTE — PROGRESS NOTES
ASSESSMENT: Leny Jeffrey is a 41 year old female with a BMI of 26.43 with past medical history significant for depression, migraine headaches, fibromyalgia, who presents today for new patient evaluation of chronic left greater than right bilateral low back pain without radicular/sciatic type leg symptoms.  The patient's pain is thought to be from sacroiliac joint pain given her positive SI provocative maneuvers on exam today.  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  This could be contributing to her symptoms as well.  She is neurologically intact and without any red flag symptoms.    PSP:  Dr. Humphreys      PLAN:  A shared decision making model was used.  The patient's values and choices were respected.  The following represents what was discussed and decided upon by the physician and the patient.      1.  DIAGNOSTIC TESTS/OUTSIDE RECORDS:    -The patient's MRI of the lumbar spine from February 29 of 2020 was personally reviewed today by the physician with the physician performing her own interpretation. There is a slight disc bulge at the L3-4 level but this does not cause any nerve root impingement, central canal stenosis or foraminal stenosis. There is a disc bulge at the L4-5 level. There is also facet arthropathy with ligamentum flavum thickening at this level. However there is no significant central canal stenosis or foraminal stenosis. There is disc degeneration seen at the L5-S1 level with facet hypertrophy. This does cause mild lateral recess stenosis and left greater than right foraminal stenosis. There is no high-grade foraminal stenosis or central canal stenosis seen throughout the lumbar spine  -Patient's MRI of the pelvis from October 9 of 2021 was personally reviewed today by the physician with physician performing her own interpretation. There is no inflammatory  sacroiliitis. There is partial disc degeneration at the L4-5 level. There is some edema within the left inferior L4 endplate. 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   - The images were shown to the patient and the findings were explained using a spine model.  -Her primary care physician's note from January limits of 2022 was reviewed today by the physician and is summarized as follows: Patient has fibromyalgia and has tried several medications but without success. She is either had side effects or they have been ineffective for her. She has been referred to functional medicine. She has tried lumbar injections in the past. She did see rheumatology. She was told that there is no inflammation but there was sacralization of the L5 vertebrae. She is interested in referral to the spine clinic and wants to try a cortisone injection.  2.  PHYSICAL THERAPY: I did recommend that the patient go to physical therapy to work on mainly flexibility and mobility.  The patient is willing to go.  I did write an order for the [facility] to work with one of the spine therapist.  She has done physical therapy in the past but it sounds like her home exercise program was fairly basic  3.  MEDICATIONS: I did offer her meloxicam 15 mg p.o. daily with as needed pain.  She did want to try this since this is only once a day and as needed.  I did explain that this is a Godwin 2 inhibitor which has anti-inflammatory effects but is supposed to be easier on the stomach then regular NSAIDs.  -The patient has tried sertraline, duloxetine, gabapentin. She had side effects with the first 2 and gabapentin was ineffective.  -She has tried tizanidine but this was also ineffective.  -She had side  effects to Savella and is currently tapering off of this  4.  INTERVENTIONS: I did offer her bilateral sacroiliac joint injections and she would like to try this.  An order was placed today and she can schedule this for at least 2 weeks out to allow her to fully recover from her recent infections.  -If she failed to have relief with the bilateral sacroiliac joint injections, we could ask insurance approval for injection of the ankylosis at the left L5 transverse process and sacrum and the pseudoarticulation of the right L5 transverse process and sacrum.  -She did undergo an L5-S1 interlaminar epidural steroid injection on July 26 of 2020 which did not provide any relief.  -She did undergo bilateral L4-5 and L5-S1 facet joint injections on July 7 of 2020 that did not provide any significant relief.  5.  PATIENT EDUCATION:   -I did discuss that the mechanics of the spine may be affected by the ankylosis and pseudoarticulation of the L5 transverse processes with the sacrum.  I explained that the most important thing is to make sure that her core strength is good to be able to support the spine and also to increase her mobility and flexibility in order to decrease any excess stress or strain on the lumbar spine.  -Patient is encouraged to follow through with the functional medicine evaluation that she was referred to by her primary care physician.  6.  FOLLOW-UP:   Patient follow-up in 2 to 3 weeks for the bilateral SI joint injections and then we will have her follow-up 2 to 3 weeks after that for assessment after how she is done with the injections if there are any questions/concerns or any significant worsening of pain prior to that time, the patient is asked to call the clinic via the nurse navigation line or via [employer].       SUBJECTIVE:  Leny Jeffrey  Is a 41 year old female who presents today for new patient evaluation of low back pain that is chronic.  Reports that she has had pain ever since she was in high  school.  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  She says that [place] things are always painful, and never stopped her from being able to do things.  Reports though that in the last 5 years the pain has significantly worsened.  Reports that she has episodes where she can hardly move.  She says that while it always hurts, some episodes are worse than others.  Says that she is a single mom and she just has to keep going and she will push herself through the pain.  She has noticed that even with lying in bed, is difficult for her to turn over.  She has seen rheumatology who is ruled out ankylosing spondylitis, but she states that there is a transitional anatomy with some other ankylosis that she believes is causing the pain given that it is in the exact area where she experiences the pain.  The pain is just in the low back.  It does not radiate down into the legs.  She denies any numbness tingling or weakness in the legs.  Her pain is worse with standing still for 5 to 10 minutes or longer.  Sitting for more than 30 minutes will aggravate the pain.  She does feel like walking, bending forward or squatting will help alleviate the pain though it never completely resolves it.    She did undergo physical therapy about 6 weeks before Covid started.  She was not really finding it helpful and the physical therapist recommended an MRI, which she did have done but then Covid happened and she never returned for more physical therapy.  She underwent bilateral L4-5 and L5-S1 facet joint injections on July 7 of 2020 which did not provide any relief.  She underwent an L5-S1 interlaminar epidural steroid injection on July 26 of 2020 which did not provide any relief.  She says that she has tried numerous medications including duloxetine, Savella, gabapentin, Tylenol, ibuprofen, Vicodin without any significant relief.  She is interested today in further injections to try and help with her pain.      She does state that she  has some upper back pain that is constant and always bothersome.  She says that it is more stressful and tight, but the low back pain is debilitating and that is what she wants to focus on today    Medications:  Reviewed and correct in the chart.      Allergies: Reviewed and scopolamine patch caused blurred vision.    PMH:  Reviewed and significant for patient, migraine headaches, fibromyalgia.    PSH:  Reviewed and significant for rhinoplasty, salpingectomy.    Family History:  Reviewed and significant for colon cancer, heart disease, abdominal aortic aneurysm.    Social History: The patient is  and works as an RN.  She rarely drinks alcohol.  She denies any illicit drug use.    ROS: Positive for recent fevers that have resolved now (related to her recent infection), headaches, eye pain, abdominal pain, diarrhea, blood in the stool (resolved now, was related to recent Salmonella infection), joint pain (left hip), excessive tiredness.   Specifically negative for bowel/bladder dysfunction, fevers,chills, appetite changes, unexplained weight loss.  Otherwise 13 systems reviewed are negative.  Please see the patient's intake questionnaire from today for details.      OBJECTIVE:  PHYSICAL EXAMINATION:    CONSTITUTIONAL:  Vital signs as above.  No acute distress.  The patient is well nourished and well groomed.  PSYCHIATRIC:  The patient is awake, alert, oriented to person, place, time and answering questions appropriately with clear speech.    SKIN:  Skin over the face, bilateral lower extremities, and posterior torso is clean, dry, intact without rashes.    GAIT:  Gait is non-antalgic.  The patient is able to heel and toe walk without significant difficulty.    STANDING EXAMINATION: She does point to pain over the left SI joint.  However there is no tenderness to palpation over the bilateral lumbar paraspinal muscles or bilateral SI joints to palpation.  No significant increase in pain with lumbar flexion,  lumbar extension, bilateral lumbar sidebending.  She has slightly increased pain with lumbar trunk rotation bilaterally.  No significant pain with lumbar facet loading bilaterally.  She has no pain or difficulty with single-leg stance on either leg.  MUSCLE STRENGTH:  The patient has 5/5 strength for the bilateral hip flexors, knee flexors/extensors, ankle dorsiflexors/plantar flexors, great toe extensors, ankle evertors/invertors.  NEUROLOGICAL:  2/4 patellar, medial hamstring, and achilles reflexes bilaterally.  Equivocal Babinski's bilaterally.  No ankle clonus bilaterally. Sensation to light touch is intact in the bilateral L4, L5, and S1 dermatomes.  VASCULAR:  2/4 dorsalis pedis pulses bilaterally.  Bilateral lower extremities are warm.  There is no pitting edema of the bilateral lower extremities.  ABDOMINAL:  Soft, non-distended, non-tender throughout all quadrants.  No pulsatile mass palpated in the left lower quadrant.  LYMPH NODES:  No palpable or tender inguinal/popliteal lymph nodes.  MUSCULOSKELETAL: Negative straight leg exam test bilaterally.  She has minimal discomfort with bilateral hip range of motion testing with the hip in a flexed position testing internal/external rotation.  Fabere's test is positive bilaterally for pain localizing to the ipsilateral SI joint.  She has positive Gaenslen's bilaterally for pain localizing to the ipsilateral SI joint.  Positive Yeomans test bilaterally for pain localizing to the ipsilateral SI joint

## 2022-03-01 ENCOUNTER — OFFICE VISIT (OUTPATIENT)
Dept: PHYSICAL MEDICINE AND REHAB | Facility: CLINIC | Age: 42
End: 2022-03-01
Attending: FAMILY MEDICINE
Payer: COMMERCIAL

## 2022-03-01 VITALS
BODY MASS INDEX: 26.29 KG/M2 | DIASTOLIC BLOOD PRESSURE: 64 MMHG | HEIGHT: 64 IN | SYSTOLIC BLOOD PRESSURE: 129 MMHG | WEIGHT: 154 LBS | HEART RATE: 92 BPM | TEMPERATURE: 98 F

## 2022-03-01 DIAGNOSIS — M54.50 CHRONIC BILATERAL LOW BACK PAIN WITHOUT SCIATICA: Primary | ICD-10-CM

## 2022-03-01 DIAGNOSIS — G89.29 CHRONIC BILATERAL LOW BACK PAIN WITHOUT SCIATICA: Primary | ICD-10-CM

## 2022-03-01 DIAGNOSIS — M53.3 SACROILIAC JOINT PAIN: ICD-10-CM

## 2022-03-01 PROCEDURE — 99204 OFFICE O/P NEW MOD 45 MIN: CPT | Performed by: PHYSICAL MEDICINE & REHABILITATION

## 2022-03-01 RX ORDER — MELOXICAM 7.5 MG/1
15 TABLET ORAL DAILY
Qty: 30 TABLET | Refills: 1 | Status: SHIPPED | OUTPATIENT
Start: 2022-03-01 | End: 2023-01-13

## 2022-03-01 ASSESSMENT — PAIN SCALES - GENERAL: PAINLEVEL: MODERATE PAIN (4)

## 2022-03-01 NOTE — LETTER
3/1/2022         RE: Leny Jeffrey  [address] [facility] [address]        Dear Colleague,    Thank you for referring your patient, Leny Jeffrey, to the [facility] [place]. Please see a copy of my visit note below.    ASSESSMENT: Leny Jeffrey is a 41 year old female with a BMI of 26.43 with past medical history significant for depression, migraine headaches, fibromyalgia, who presents today for new patient evaluation of chronic left greater than right bilateral low back pain without radicular/sciatic type leg symptoms.  The patient's pain is thought to be from sacroiliac joint pain given her positive SI provocative maneuvers on exam today.  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  This could be contributing to her symptoms as well.  She is neurologically intact and without any red flag symptoms.    PSP:  Dr. Humphreys      PLAN:  A shared decision making model was used.  The patient's values and choices were respected.  The following represents what was discussed and decided upon by the physician and the patient.      1.  DIAGNOSTIC TESTS/OUTSIDE RECORDS:    -The patient's MRI of the lumbar spine from February 29 of 2020 was personally reviewed today by the physician with the physician performing her own interpretation. There is a slight disc bulge at the L3-4 level but this does not cause any nerve root impingement, central canal stenosis or foraminal stenosis. There is a disc bulge at the L4-5 level. There is also facet arthropathy with ligamentum flavum thickening at this level. However there is no significant central canal stenosis or foraminal stenosis. There is disc degeneration seen at the L5-S1 level with facet hypertrophy. This does cause mild lateral recess stenosis and left greater than right foraminal stenosis. There is no high-grade  foraminal stenosis or central canal stenosis seen throughout the lumbar spine  -Patient's MRI of the pelvis from October 9 of 2021 was personally reviewed today by the physician with physician performing her own interpretation. There is no inflammatory sacroiliitis. There is partial disc degeneration at the L4-5 level. There is some edema within the left inferior L4 endplate. 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   - The images were shown to the patient and the findings were explained using a spine model.  -Her primary care physician's note from January limits of 2022 was reviewed today by the physician and is summarized as follows: Patient has fibromyalgia and has tried several medications but without success. She is either had side effects or they have been ineffective for her. She has been referred to functional medicine. She has tried lumbar injections in the past. She did see rheumatology. She was told that there is no inflammation but there was sacralization of the L5 vertebrae. She is interested in referral to the spine clinic and wants to try a cortisone injection.  2.  PHYSICAL THERAPY: I did recommend that the patient go to physical therapy to work on mainly flexibility and mobility.  The patient is willing to go.  I did write an order for the [facility] to work with one of the spine therapist.  She has done physical therapy in the past but it sounds like her home exercise program was fairly basic  3.  MEDICATIONS: I did offer her meloxicam 15 mg p.o. daily with as needed pain.  She did want to try this since this is only once a day and as needed.  I did explain that this is a Godwin 2 inhibitor which has anti-inflammatory effects but is supposed to be  easier on the stomach then regular NSAIDs.  -The patient has tried sertraline, duloxetine, gabapentin. She had side effects with the first 2 and gabapentin was ineffective.  -She has tried tizanidine but this was also ineffective.  -She had side effects to Savella and is currently tapering off of this  4.  INTERVENTIONS: I did offer her bilateral sacroiliac joint injections and she would like to try this.  An order was placed today and she can schedule this for at least 2 weeks out to allow her to fully recover from her recent infections.  -If she failed to have relief with the bilateral sacroiliac joint injections, we could ask insurance approval for injection of the ankylosis at the left L5 transverse process and sacrum and the pseudoarticulation of the right L5 transverse process and sacrum.  -She did undergo an L5-S1 interlaminar epidural steroid injection on July 26 of 2020 which did not provide any relief.  -She did undergo bilateral L4-5 and L5-S1 facet joint injections on July 7 of 2020 that did not provide any significant relief.  5.  PATIENT EDUCATION:   -I did discuss that the mechanics of the spine may be affected by the ankylosis and pseudoarticulation of the L5 transverse processes with the sacrum.  I explained that the most important thing is to make sure that her core strength is good to be able to support the spine and also to increase her mobility and flexibility in order to decrease any excess stress or strain on the lumbar spine.  -Patient is encouraged to follow through with the functional medicine evaluation that she was referred to by her primary care physician.  6.  FOLLOW-UP:   Patient follow-up in 2 to 3 weeks for the bilateral SI joint injections and then we will have her follow-up 2 to 3 weeks after that for assessment after how she is done with the injections if there are any questions/concerns or any significant worsening of pain prior to that time, the patient is asked to call the  clinic via the nurse navigation line or via [employer].       SUBJECTIVE:  Leny Jeffrey  Is a 41 year old female who presents today for new patient evaluation of low back pain that is chronic.  Reports that she has had pain ever since she was in high school.  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  She says that [place] things are always painful, and never stopped her from being able to do things.  Reports though that in the last 5 years the pain has significantly worsened.  Reports that she has episodes where she can hardly move.  She says that while it always hurts, some episodes are worse than others.  Says that she is a single mom and she just has to keep going and she will push herself through the pain.  She has noticed that even with lying in bed, is difficult for her to turn over.  She has seen rheumatology who is ruled out ankylosing spondylitis, but she states that there is a transitional anatomy with some other ankylosis that she believes is causing the pain given that it is in the exact area where she experiences the pain.  The pain is just in the low back.  It does not radiate down into the legs.  She denies any numbness tingling or weakness in the legs.  Her pain is worse with standing still for 5 to 10 minutes or longer.  Sitting for more than 30 minutes will aggravate the pain.  She does feel like walking, bending forward or squatting will help alleviate the pain though it never completely resolves it.    She did undergo physical therapy about 6 weeks before Covid started.  She was not really finding it helpful and the physical therapist recommended an MRI, which she did have done but then Covid happened and she never returned for more physical therapy.  She underwent bilateral L4-5 and L5-S1 facet joint injections on July 7 of 2020 which did not provide any relief.  She underwent an L5-S1 interlaminar epidural steroid injection on July 26 of 2020 which did not provide any relief.  She says that  she has tried numerous medications including duloxetine, Savella, gabapentin, Tylenol, ibuprofen, Vicodin without any significant relief.  She is interested today in further injections to try and help with her pain.      She does state that she has some upper back pain that is constant and always bothersome.  She says that it is more stressful and tight, but the low back pain is debilitating and that is what she wants to focus on today    Medications:  Reviewed and correct in the chart.      Allergies: Reviewed and scopolamine patch caused blurred vision.    PMH:  Reviewed and significant for patient, migraine headaches, fibromyalgia.    PSH:  Reviewed and significant for rhinoplasty, salpingectomy.    Family History:  Reviewed and significant for colon cancer, heart disease, abdominal aortic aneurysm.    Social History: The patient is  and works as an RN.  She rarely drinks alcohol.  She denies any illicit drug use.    ROS: Positive for recent fevers that have resolved now (related to her recent infection), headaches, eye pain, abdominal pain, diarrhea, blood in the stool (resolved now, was related to recent Salmonella infection), joint pain (left hip), excessive tiredness.   Specifically negative for bowel/bladder dysfunction, fevers,chills, appetite changes, unexplained weight loss.  Otherwise 13 systems reviewed are negative.  Please see the patient's intake questionnaire from today for details.      OBJECTIVE:  PHYSICAL EXAMINATION:    CONSTITUTIONAL:  Vital signs as above.  No acute distress.  The patient is well nourished and well groomed.  PSYCHIATRIC:  The patient is awake, alert, oriented to person, place, time and answering questions appropriately with clear speech.    SKIN:  Skin over the face, bilateral lower extremities, and posterior torso is clean, dry, intact without rashes.    GAIT:  Gait is non-antalgic.  The patient is able to heel and toe walk without significant difficulty.    STANDING  EXAMINATION: She does point to pain over the left SI joint.  However there is no tenderness to palpation over the bilateral lumbar paraspinal muscles or bilateral SI joints to palpation.  No significant increase in pain with lumbar flexion, lumbar extension, bilateral lumbar sidebending.  She has slightly increased pain with lumbar trunk rotation bilaterally.  No significant pain with lumbar facet loading bilaterally.  She has no pain or difficulty with single-leg stance on either leg.  MUSCLE STRENGTH:  The patient has 5/5 strength for the bilateral hip flexors, knee flexors/extensors, ankle dorsiflexors/plantar flexors, great toe extensors, ankle evertors/invertors.  NEUROLOGICAL:  2/4 patellar, medial hamstring, and achilles reflexes bilaterally.  Equivocal Babinski's bilaterally.  No ankle clonus bilaterally. Sensation to light touch is intact in the bilateral L4, L5, and S1 dermatomes.  VASCULAR:  2/4 dorsalis pedis pulses bilaterally.  Bilateral lower extremities are warm.  There is no pitting edema of the bilateral lower extremities.  ABDOMINAL:  Soft, non-distended, non-tender throughout all quadrants.  No pulsatile mass palpated in the left lower quadrant.  LYMPH NODES:  No palpable or tender inguinal/popliteal lymph nodes.  MUSCULOSKELETAL: Negative straight leg exam test bilaterally.  She has minimal discomfort with bilateral hip range of motion testing with the hip in a flexed position testing internal/external rotation.  Fabere's test is positive bilaterally for pain localizing to the ipsilateral SI joint.  She has positive Gaenslen's bilaterally for pain localizing to the ipsilateral SI joint.  Positive Yeomans test bilaterally for pain localizing to the ipsilateral SI joint          Again, thank you for allowing me to participate in the care of your patient.        Sincerely,        Florencia Nichols DO

## 2022-03-01 NOTE — PATIENT INSTRUCTIONS
Leny    I think that your sacroiliac joints are playing a role in your pain and possibly the ankylosis/pseudoarticulation of the L5 transverse processes with the sacrum.  It will be very important to work on your mobility and flexibility to decrease any mechanical strain on the low back due to this anatomical variant.  An order for physical therapy has been provided today to work on mobility and flexibility in addition to strength.  Someone will call you to schedule physical therapy or you can call [phone] to schedule physical therapy.  It will be very important for you to do your physical therapy exercises on a regular basis to decrease your pain and prevent future flares of pain.    Meloxicam (anti-inflammatory medication) 15 mg 1 tablet once a day as needed for pain is prescribed today.  Please do not take any ibuprofen/Advil/Motrin/Aleve/naproxen while you take meloxicam.    Bilateral sacroiliac joint injections have been ordered today.      Please schedule this injection at least 2 weeks  from now to allow time for insurance prior authorization.  On the day of your injection, you cannot be sick or taking antibiotics.  If you become sick and antibiotics are prescribed, please call the clinic so your injection can be rescheduled for once you have completed your antibiotics.  You will need to bring a  with you for your injection.   If you have any questions or concerns prior to your injection, please do not hesitate to call the nurse navigation line at [phone] or contact me through [employer].    Thanks, Leny!  Dr. Humphreys

## 2022-04-05 ENCOUNTER — HOSPITAL ENCOUNTER (OUTPATIENT)
Dept: PHYSICAL THERAPY | Facility: REHABILITATION | Age: 42
Discharge: HOME | End: 2022-04-05
Attending: PHYSICAL MEDICINE & REHABILITATION
Payer: COMMERCIAL

## 2022-04-05 DIAGNOSIS — M62.81 MUSCLE WEAKNESS (GENERALIZED): ICD-10-CM

## 2022-04-05 DIAGNOSIS — M54.50 CHRONIC BILATERAL LOW BACK PAIN WITHOUT SCIATICA: Primary | ICD-10-CM

## 2022-04-05 DIAGNOSIS — G89.29 CHRONIC BILATERAL LOW BACK PAIN WITHOUT SCIATICA: Primary | ICD-10-CM

## 2022-04-05 DIAGNOSIS — M53.3 SACROILIAC JOINT PAIN: ICD-10-CM

## 2022-04-05 PROCEDURE — 97161 PT EVAL LOW COMPLEX 20 MIN: CPT | Mod: GP | Performed by: PHYSICAL THERAPIST

## 2022-04-05 PROCEDURE — 97110 THERAPEUTIC EXERCISES: CPT | Mod: GP | Performed by: PHYSICAL THERAPIST

## 2022-04-05 NOTE — PROGRESS NOTES
"Assessment:  Pt presents with a chronic h/o of L>R LBP with pain primarily over the L SI joint.  She has tried several treatments, medications and injection in the past with no relief of sx.  Today she has increased pain with lumbar ROM in all directions. In standing and supine she has a left pelvic upslip with the L LE shorter.  She has decreased LE flexibility including B hamstring and hip flexor tightness.  The pt has decreased L>R hip and core strength and stability.  She has increased tenderness over the L SI joint and surrounding musculature.   She will benefit from skilled PT in effort to improve her overall body mechanics to decreased pain and increase function.     Exercises:   Date 4/5/22   Exercise    Seated H/S stretch  Bx30\"    Standing hip flexor stretch  Bx30\"    Hip hiking  x10 in standing    LTR Bx10    Piriformis stretch  Bx30\"        Ab set x5 with 5\" hold   Cue for march in a few days    SLR: ABD R, L x10    Clamshells  R, L x10    SLR: hip extension  Prone Bx10 alt                Florencia Monroe, PT   "

## 2022-04-12 ENCOUNTER — HOSPITAL ENCOUNTER (OUTPATIENT)
Dept: PHYSICAL THERAPY | Facility: REHABILITATION | Age: 42
Discharge: HOME | End: 2022-04-12
Attending: PHYSICAL MEDICINE & REHABILITATION
Payer: COMMERCIAL

## 2022-04-12 ENCOUNTER — RADIOLOGY INJECTION OFFICE VISIT (OUTPATIENT)
Dept: PHYSICAL MEDICINE AND REHAB | Facility: CLINIC | Age: 42
End: 2022-04-12
Attending: PHYSICAL MEDICINE & REHABILITATION
Payer: COMMERCIAL

## 2022-04-12 VITALS
RESPIRATION RATE: 14 BRPM | SYSTOLIC BLOOD PRESSURE: 100 MMHG | DIASTOLIC BLOOD PRESSURE: 74 MMHG | TEMPERATURE: 98.1 F | OXYGEN SATURATION: 98 % | HEART RATE: 77 BPM

## 2022-04-12 DIAGNOSIS — M53.3 SACROILIAC JOINT PAIN: ICD-10-CM

## 2022-04-12 DIAGNOSIS — G89.29 CHRONIC BILATERAL LOW BACK PAIN WITHOUT SCIATICA: ICD-10-CM

## 2022-04-12 DIAGNOSIS — M54.50 CHRONIC BILATERAL LOW BACK PAIN WITHOUT SCIATICA: Primary | ICD-10-CM

## 2022-04-12 DIAGNOSIS — M62.81 MUSCLE WEAKNESS (GENERALIZED): ICD-10-CM

## 2022-04-12 DIAGNOSIS — G89.29 CHRONIC BILATERAL LOW BACK PAIN WITHOUT SCIATICA: Primary | ICD-10-CM

## 2022-04-12 DIAGNOSIS — M54.50 CHRONIC BILATERAL LOW BACK PAIN WITHOUT SCIATICA: ICD-10-CM

## 2022-04-12 PROCEDURE — 27096 INJECT SACROILIAC JOINT: CPT | Mod: 50 | Performed by: PHYSICAL MEDICINE & REHABILITATION

## 2022-04-12 PROCEDURE — 97110 THERAPEUTIC EXERCISES: CPT | Mod: GP | Performed by: PHYSICAL THERAPIST

## 2022-04-12 RX ORDER — ROPIVACAINE HYDROCHLORIDE 5 MG/ML
INJECTION, SOLUTION EPIDURAL; INFILTRATION; PERINEURAL
Status: COMPLETED | OUTPATIENT
Start: 2022-04-12 | End: 2022-04-12

## 2022-04-12 RX ORDER — METHYLPREDNISOLONE ACETATE 80 MG/ML
INJECTION, SUSPENSION INTRA-ARTICULAR; INTRALESIONAL; INTRAMUSCULAR; SOFT TISSUE
Status: COMPLETED | OUTPATIENT
Start: 2022-04-12 | End: 2022-04-12

## 2022-04-12 RX ADMIN — METHYLPREDNISOLONE ACETATE 80 MG: 80 INJECTION, SUSPENSION INTRA-ARTICULAR; INTRALESIONAL; INTRAMUSCULAR; SOFT TISSUE at 15:26

## 2022-04-12 RX ADMIN — ROPIVACAINE HYDROCHLORIDE 5 ML: 5 INJECTION, SOLUTION EPIDURAL; INFILTRATION; PERINEURAL at 15:22

## 2022-04-12 ASSESSMENT — PAIN SCALES - GENERAL
PAINLEVEL: MODERATE PAIN (5)
PAINLEVEL: MODERATE PAIN (5)

## 2022-04-12 NOTE — PATIENT INSTRUCTIONS
Follow-up visit with Dr. Humphreys in 2 weeks to discuss injection outcome and determine care plan going forward.       DISCHARGE INSTRUCTIONS    During office hours (8:00 a.m.- 4:00 p.m.) questions or concerns may be answered  by calling Spine Center Navigation Nurses at  [phone].  Messages received after hours will be returned the following business day.      In the case of an emergency, please dial 911 or seek assistance at the nearest Emergency Room/Urgent Care facility.     All Patients:    You may experience an increase in your symptoms for the first 2 days (It may take anywhere between 2 days- 2 weeks for the steroid to have maximum effect).    You may use ice on the injection site, as frequently as 20 minutes each hour if needed.    You may take your pain medicine.    You may continue taking your regular medication after your injection. If you have had a Medial Branch Block you may resume pain medication once your pain diary is completed.    You may shower. No swimming, tub bath or hot tub for 48 hours.  You may remove your bandaid/bandage as soon as you are home.    You may resume light activities, as tolerated.    Resume your usual diet as tolerated.    It is strongly advised that you do not drive for 1-3 hours post injection.    If you have had oral sedation:  Do not drive for 8 hours post injection.      If you have had IV sedation:  Do not drive for 24 hours post injection.  Do not operate hazardous machinery or make important personal/business decisions for 24 hours.      POSSIBLE STEROID SIDE EFFECTS (If steroid/cortisone was used for your procedure)    -If you experience these symptoms, it should only last for a short period    Swelling of the legs              Skin redness (flushing)     Mouth (oral) irritation   Blood sugar (glucose) levels            Sweats                    Mood changes  Headache  Sleeplessness  Weakened immune system for up to 14 days, which could increase the risk of  naomy the COVID-19 virus and/or experiencing more severe symptoms of the disease, if exposed.  Decreased effectiveness of the flu vaccine if given within 2 weeks of the steroid.         POSSIBLE PROCEDURE SIDE EFFECTS  -Call the Spine Center if you are concerned  Increased Pain           Increased numbness/tingling      Nausea/Vomiting          Bruising/bleeding at site      Redness or swelling                                              Difficulty walking      Weakness           Fever greater than 100.5    *In the event of a severe headache after an epidural steroid injection that is relieved by lying down, please call the [facility] to speak with a clinical staff member*

## 2022-04-12 NOTE — PROGRESS NOTES
"Outpatient Physical Therapy Daily Progress Note    Patient: Leny Jeffrey  : 1980  Start of Care: 22  Date of Visit: 2022  Visit:     Referring Provider:     Therapy Diagnosis: L>R LBP/SI pain with weakness      Client Self Report:    Pt reports that things are going pretty well.  She has been doing the exercises and she feels like they are going well.  She is feeling some increased L hamstring tightness.  Her low back pain continues to be painful.  She is having her lumbar injections today.      Objective Measurements:    Increased core and pelvic strength and stability with exercises     Pelvis level today.       Assessment:  Pt returns for follow-up in addressing her chronic h/o of L>R LBP with pain primarily over the L SI joint.  She has tried several treatments, medications and injection in the past with no relief of sx.  She has increased pain with lumbar ROM in all directions. In standing and supine she has a left pelvic upslip with the L LE shorter.  She has decreased LE flexibility including B hamstring and hip flexor tightness.  The pt has decreased L>R hip and core strength and stability.  She has increased tenderness over the L SI joint and surrounding musculature.   She has tolerated the initiation of her HEP with some soreness and minimal cues.  She will be having SI injections later today and we will proceed in PT according to her results.  She will benefit from continuing with skilled PT in effort to improve her overall body mechanics to decreased pain and increase function.    Goals:  See daily doc     Plan:  Continue therapy per current plan of care.      Today's Treatment:       Exercises:   Date 22   Exercise     Seated H/S stretch  Bx30\"  Bx30\"    Standing hip flexor stretch   Bx30\"    Hip hiking  Bx60\"  x10 in standing    LTR  Bx10    Piriformis stretch  Seated Bx30\" instead of laying d/t hip pain  Bx30\"         Ab set Ab set x5 with 5\" hold   AB set + march Bx5 - " "cues for HEP; 2-3 sets x5 with 5\" hold   Cue for march in a few days    SLR: ABD R,  L x15  R, L x10    Zaria  R, L x15  R, L x10    SLR: hip extension  Bx12 alt  Prone Bx10 alt                  Florencia Monroe, PT   "

## 2022-04-19 ENCOUNTER — HOSPITAL ENCOUNTER (OUTPATIENT)
Dept: PHYSICAL THERAPY | Facility: REHABILITATION | Age: 42
Discharge: HOME | End: 2022-04-19
Attending: PHYSICAL MEDICINE & REHABILITATION
Payer: COMMERCIAL

## 2022-04-19 DIAGNOSIS — M54.50 CHRONIC BILATERAL LOW BACK PAIN WITHOUT SCIATICA: Primary | ICD-10-CM

## 2022-04-19 DIAGNOSIS — G89.29 CHRONIC BILATERAL LOW BACK PAIN WITHOUT SCIATICA: Primary | ICD-10-CM

## 2022-04-19 DIAGNOSIS — M53.3 SACROILIAC JOINT PAIN: ICD-10-CM

## 2022-04-19 DIAGNOSIS — M62.81 MUSCLE WEAKNESS (GENERALIZED): ICD-10-CM

## 2022-04-19 PROCEDURE — 97110 THERAPEUTIC EXERCISES: CPT | Mod: GP | Performed by: PHYSICAL THERAPIST

## 2022-04-19 PROCEDURE — 97140 MANUAL THERAPY 1/> REGIONS: CPT | Mod: GP | Performed by: PHYSICAL THERAPIST

## 2022-04-19 NOTE — PROGRESS NOTES
"Outpatient Physical Therapy Daily Progress Note    Patient: Leny Jeffrey  : 1980  Start of Care: 22  Date of Visit: 2022  Visit: 3/6    Referring Provider:     Therapy Diagnosis: L>R LBP/SI pain with weakness      Client Self Report:    Pt reports that she had B SI injections one week ago on 22 and she has not yet felt any sx relief.  She has probably felt more achy over the past week.  She took it easy with her exercises initially and then has gotten back into the full set.    Today, she is feeling aching in her low back and SI area bilaterally.      Objective Measurements:    Increased core and pelvic strength and stability with exercises     Pelvis level today.     Moderate lumbar and SI tenderness       Assessment:  Pt returns for follow-up in addressing her chronic h/o of L>R LBP with pain primarily over the L SI joint.  She has tried several treatments, medications and injection in the past with no relief of sx.  She has increased pain with lumbar ROM in all directions. In standing and supine she has a left pelvic upslip with the L LE shorter.  She has decreased LE flexibility including B hamstring and hip flexor tightness.  The pt has decreased L>R hip and core strength and stability.  She has increased tenderness over the L SI joint and surrounding musculature.   She had B SI injections a week ago and has not yet experienced a change in her sx.  Today, PT initiated MT over the lumbar and SI area due to increased soreness, she tolerated this well.  She continues to progress very well with her HEP and strength.  She will benefit from continuing with skilled PT in effort to improve her overall body mechanics to decreased pain and increase function.    Goals:  See daily doc     Plan:  Continue therapy per current plan of care.      Today's Treatment:       Exercises:   Date 22   Exercise      Treadmill Warm-up  x5 min      Seated H/S stretch  Bx30\"  Bx30\"  Bx30\"  " "  Standing hip flexor stretch    Bx30\"    Hip hiking  Bx10 after MT Bx60\"  x10 in standing    LTR Bx10   Bx10    Piriformis stretch  Seated Bx30\" Seated Bx30\" instead of laying d/t hip pain  Bx30\"          Ab set B 2x5  Ab set x5 with 5\" hold   AB set + march Bx5 - cues for HEP; 2-3 sets x5 with 5\" hold   Cue for march in a few days    SLR: ABD R, L x20  R,  L x15  R, L x10    Clamshells  R, L x20  R, L x15  R, L x10    SLR: hip extension  Bx20 alt  Bx12 alt  Prone Bx10 alt                    Florencia Monroe, PT   "

## 2022-04-26 ENCOUNTER — HOSPITAL ENCOUNTER (OUTPATIENT)
Dept: PHYSICAL THERAPY | Facility: REHABILITATION | Age: 42
Discharge: HOME | End: 2022-04-26
Payer: COMMERCIAL

## 2022-04-26 DIAGNOSIS — M62.81 MUSCLE WEAKNESS (GENERALIZED): ICD-10-CM

## 2022-04-26 DIAGNOSIS — M54.50 CHRONIC BILATERAL LOW BACK PAIN WITHOUT SCIATICA: Primary | ICD-10-CM

## 2022-04-26 DIAGNOSIS — M53.3 SACROILIAC JOINT PAIN: ICD-10-CM

## 2022-04-26 DIAGNOSIS — G89.29 CHRONIC BILATERAL LOW BACK PAIN WITHOUT SCIATICA: Primary | ICD-10-CM

## 2022-04-26 PROCEDURE — 97110 THERAPEUTIC EXERCISES: CPT | Mod: GP | Performed by: PHYSICAL THERAPIST

## 2022-04-26 PROCEDURE — 97140 MANUAL THERAPY 1/> REGIONS: CPT | Mod: GP | Performed by: PHYSICAL THERAPIST

## 2022-04-26 NOTE — PROGRESS NOTES
Assessment:   Leny Jeffrey is a 41 year old y.o. female with past medical history significant for depression, migraine headaches, fibromyalgia who presents today for follow-up regarding chronic bilateral low back pain (left greater than right) secondary to sacroiliac joint pain.  is status post bilateral sacroiliac joint injections on April 12, 2022.  At this time, she did not have any relief of her pain and is stating that the majority of her pain is on the left side and she does feel like there is worsening in the form of more general achiness in the surrounding low back area.  Her pain may be from lumbar facet joint syndrome secondary to lumbar facet arthropathy.  She is neurologically intact and without any red flag symptoms.    PSP:  Dr. Humphreys       Plan:     A shared decision making plan was used.  The patient's values and choices were respected.  The following represents what was discussed and decided upon by the physician and the patient.      1.  DIAGNOSTIC TESTS:   -I did consider getting an x-ray of the lumbar spine to help clarify her transitional anatomy given that her MRI of the lumbar spine does not match the report of the MRI of the pelvis.  However she is worried about cost at this point as she does have of previous medical bills that she is trying to pay.  At this time I feel comfortable going with the MRI report of the lumbar spine, as he could see the ribs in that report and they could not see them in the MRI of the pelvis.  We will go with the anatomy of a lumbarized S1 with a vestigial disc at S1-2.  - The patient's MRI of the lumbar spine from February 29, 2020 was personally reviewed today by the physician's physician performing her own interpretation.  There is slight disc bulge at the L3-4 level but this does not cause any nerve root impingement, central canal stenosis or foraminal stenosis.  There is a disc bulge at the L4-5 level.  There is also facet arthropathy with ligamentum flavum  thickening at this level as well.  There is no significant central canal stenosis or foraminal stenosis at L4-5.  There is disc degeneration seen at the L5-S1 level with facet hypertrophy.  This does cause mild lateral recess stenosis and left greater than right foraminal stenosis.  There is no high-grade foraminal stenosis or central canal stenosis seen.  The MRI of the lumbar spine states there is a lumbarized S1 with a vestigial disc at S1-2  -Patient's MRI of the pelvis from October 9, 2021 was personally reviewed today by the physician of the physician performing her own interpretation.  There is no inflammatory sacroiliitis.  There is partial disc degeneration at the L4-5 level with some edema in the left inferior L4 endplate.  Per the radiology report, there is transitional anatomy with a sacralized L5.  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  2.  PHYSICAL THERAPY: The patient was referred to physical therapy at her last visit.  At this time, she has completed 4-5 sessions.  I explained that she is welcome to continue to go to physical therapy or she can stop if she feels confident she knows how to do her home exercise program  3.  MEDICATIONS:    -At the last visit, meloxicam 15 mg once a day as needed for pain was prescribed.  At this time, she has stopped taking it as she states that it was not effective.  I do not have an alternative for her at this time and she is not looking for a new medication at this time.  -She has tried sertraline, duloxetine, gabapentin.  She had side effects with sertraline and duloxetine and the gabapentin was ineffective.  -She has tried tizanidine but this was also ineffective.  -He had side effects to Savella.  4.  INTERVENTIONS: I did discuss a left L3, 4, 5 medial branch block to determine if her pain is coming from the facet joints at the L4-5 and L5-S1  level.  She would like to move forward with this.  -If she fails to have relief with these injections, then I would recommend a left L5 transverse process and sacral pseudoarticulation injection.  -She is status post bilateral SI joint injections on April 12, 2022.  She milligrams pain on the right side, but she continues to have pain in the left side stating that there was absolutely no relief of the left-sided pain  5.  PATIENT EDUCATION:    -I discussed the diagnosis of the pseudoarticulation.  I discussed the possibility of the lumbar facet joints causing her pain.  -All of her questions were answered to her satisfaction today.  She was in agreement with the treatment plan.  6.  FOLLOW-UP: Patient follow-up in 1 week for the medial branch blocks if there are any questions/concerns or any significant worsening of pain prior to that time, the patient is asked to call the clinic via the nurse navigation line or via [employer]     Subjective:     Leny Jeffrey is a 41 year old female who presents today for follow-up regarding midline and left-sided low back pain without radicular/sciatic type leg symptoms.  The patient is status post bilateral sacroiliac joint injections on April 12, 2022.  She reports that she really does not have any pain in the right side at this time.  Is just in the midline and then going off into the left low back.  It does not go down the leg.  She denies any numbness tingling or weakness in the leg.  She rates her pain today at a 6 out of 10.  At worst it is a 9 out of 10.  At best is a 5 out of 10.  Her pain is worse with standing for long periods of time.  Laying flat on her stomach or flat on her back will aggravate her pain.  Impact such as jumping or lifting anything over 10 pounds can aggravate her pain.  She notices that if she lays in bed and if she has to reach to her right side, that this will cause really severe pain.  She says that nothing really makes it better at this time.  She  has to change positions frequently, but the pain never completely goes away.  She feels like she has a little more aching in the surrounding low back area.  She has gone to 4-5 sessions of physical therapy and is doing her exercises on a regular basis.  She is questioning if she should continue to go to physical therapy.  She did not really find the meloxicam helpful.  She is wondering if there is anything else that can be done.    Past medical history is reviewed and is unchanged for any new medical diagnoses in the interim.      Family history is reviewed and is unchanged in the interim.      Review of Systems:  Positive for occasional headaches.  Pertinent negatives include no numbness, tingling, weakness, fevers, chills, unexplained weight loss, bowel incontinence, bladder incontinence, trips, stumbles, falls.  All others reviewed and are negative.     Objective:   CONSTITUTIONAL:  Vital signs as above.  No acute distress.  The patient is well nourished and well groomed.    PSYCHIATRIC:  The patient is awake, alert, oriented to person, place and time.  The patient is answering questions appropriately with clear speech.  Normal affect.  SKIN:  Skin over the face, posterior torso, bilateral upper and lower extremities is clean, dry, intact without rashes.  MUSCULOSKELETAL:  Gait is non-antalgic.  The patient is able to heel and toe walk without any difficulty.  She points to pain over the area of the SI joint on the left side.  However the area is not tender to palpation.  I am unable to reproduce pain with palpating over the general area.  She does report pain with lumbar flexion.  She does have pain with lumbar extension, but the 2 pains are slightly different.  She does not have any pain with bilateral lumbar sidebending.  She has minimal pain with bilateral trunk rotation.  She does have significant pain with both left and right lumbar facet loading (simultaneous extension rotation).     The patient has 5/5  strength for the bilateral hip flexors, knee flexors/extensors, ankle dorsiflexors/plantar flexors, ankle evertors/invertors.    NEUROLOGICAL: 2/4 patellar, medial hamstring, achilles reflexes which are symmetric bilaterally.  No ankle clonus bilaterally.  Sensation to light touch is intact in the bilateral L4, L5, and S1 dermatomes.

## 2022-04-26 NOTE — PROGRESS NOTES
Outpatient Physical Therapy Discharge Note     Patient: Leny Jeffrey  : 1980    Beginning/End Dates of Reporting Period:  22 to 22    Referring Provider: Dr. Florencia Nichols    Therapy Diagnosis: L>R LBP/SI pain with weakness      Client Self Report: Pt reports that she did some yard work on  including bending over several times and today her low back and legs are feeling pretty sore.  She can tell she is even walking differently.  She has tried to focus on more stretching.  She has continued to do her HEP.   She reports that she still cannot feel any improvement in her SI injections on 22.   It is hard to tell if MT or PT is helpful.    Objective Measurements:  See note below for most recent objective information.     Goals: Partially Met   Goal Identifier 1   Goal Description Pt will demonstarte readiness for independent sx management and HEP   Target Date 22   Date Met      Progress (detail required for progress note): Progressing toward     Goal Identifier 2   Goal Description Pt will be able to stand for daily tasks including cooking, self-care with increased ease and pain <4/10   Target Date 22   Date Met      Progress (detail required for progress note): Progressing toward - she continues to perform tasks despite pain     Goal Identifier 3   Goal Description Pt will report improved sleep quality due to her back pain    Target Date 22   Date Met      Progress (detail required for progress note): Progressing toward - still waking     Goal Identifier 4   Goal Description Pt will decrease her NIKKIE score to <=25% for improved quality of life.    Target Date 22   Date Met      Progress (detail required for progress note): not assessed today     Plan:  Discharge from therapy.    Reason for Discharge: Patient has not made expected progress due to interrupted treatment attendance.  Patient has failed to schedule further appointments.    Discharge Plan: Patient  to continue home program.      Florencia Monroe PT       Outpatient Physical Therapy Daily Progress Note    Patient: Leny Jeffrey  : 1980  Start of Care: 22  Date of Visit: 2022  Visit:     Referring Provider:  Florencia Nichols     Therapy Diagnosis: L>R LBP/SI pain with weakness      Client Self Report:    Pt reports that she did some yard work on  including bending over several times and today her low back and legs are feeling pretty sore.  She can tell she is even walking differently.  She has tried to focus on more stretching.  She has continued to do her HEP.   She reports that she still cannot feel any improvement in her SI injections on 22.   It is hard to tell if MT or PT is helpful.         Objective Measurements:    Lumbar ROM:   Flexion = no loss, min L>R SI pain   Extension = min loss, L SI pain   R SB = no loss, no pain   L SB = no loss, no pain   R SB + ext = no loss, min R SI pain   L SB + ext = no loss, min R SI pain   R Rot = no loss, no pain   L Rot = no loss, no pain     Increased core and pelvic strength and stability with exercises     Pelvis level today.     Moderate lumbar and SI tenderness - L>R       Assessment:  Pt returns for her 4th follow-up in addressing her chronic h/o of L>R LBP with pain primarily over the L SI joint.  She has tried several treatments, medications and injection in the past with no relief of sx.  She continues to have minimal but increased pain with lumbar ROM in most directions. In standing and supine, her pelvis has remained level for the past few sessions.  She has increasing LE flexibility including B hamstring and hip flexors.  The pt has decreased L>R hip and core strength and stability, but this is improving and being addressed with her HEP.  She has decreasing tenderness over the L SI joint and surrounding musculature.   She had B SI injections 2 weeks ago and has not yet experienced a change in her sx.  Today, PT continued  "with MT over the lumbar and SI area due to continued soreness, she tolerated this well.  She continues to progress very well with her HEP and strength.  She will see her provider and the determine if continuing with skilled PT for continued strengthening progressions or moving to independence is best.      Goals:  See daily doc = Progressing toward on all - updated today     Plan:  Pt returning to provider tomorrow to discuss her injection results as well as a POC.  PT will continue if appropriate .      Today's Treatment:       Exercises:   Date 4/26/22 4/19/22 4/12/22 4/5/22   Exercise       Treadmill Warm-up  x5 min  x5 min      Seated H/S stretch  Supine Manual with contract relax x60\"   Manual Bx60\"  Bx30\"  Bx30\"  Bx30\"    Standing hip flexor stretch  Manual B x60\"   Bx30\"    Hip hiking   Bx10 after MT Bx60\"  x10 in standing    LTR Bx10  Bx10   Bx10    Piriformis stretch   Seated Bx30\" Seated Bx30\" instead of laying d/t hip pain  Bx30\"           Ab set Dead bug with legs in hooklying   Bx10  B 2x5  Ab set x5 with 5\" hold   AB set + march Bx5 - cues for HEP; 2-3 sets x5 with 5\" hold   Cue for march in a few days    SLR: ABD Cue on stand vs lay R, L x20  R,  L x15  R, L x10    Clamshells  Orange band R, L x12 R, L x20  R, L x15  R, L x10    SLR: hip extension  B 2x10  Bx20 alt  Bx12 alt  Prone Bx10 alt    Planks  2x20\"                 Florencia Monroe, PT   "

## 2022-04-27 ENCOUNTER — OFFICE VISIT (OUTPATIENT)
Dept: PHYSICAL MEDICINE AND REHAB | Facility: CLINIC | Age: 42
End: 2022-04-27
Payer: COMMERCIAL

## 2022-04-27 VITALS — HEART RATE: 78 BPM | DIASTOLIC BLOOD PRESSURE: 63 MMHG | OXYGEN SATURATION: 100 % | SYSTOLIC BLOOD PRESSURE: 112 MMHG

## 2022-04-27 DIAGNOSIS — M54.50 CHRONIC LEFT-SIDED LOW BACK PAIN WITHOUT SCIATICA: Primary | ICD-10-CM

## 2022-04-27 DIAGNOSIS — M47.816 LUMBAR SPONDYLOSIS: ICD-10-CM

## 2022-04-27 DIAGNOSIS — M47.816 LUMBAR FACET JOINT SYNDROME: ICD-10-CM

## 2022-04-27 DIAGNOSIS — G89.29 CHRONIC LEFT-SIDED LOW BACK PAIN WITHOUT SCIATICA: Primary | ICD-10-CM

## 2022-04-27 PROCEDURE — 99214 OFFICE O/P EST MOD 30 MIN: CPT | Performed by: PHYSICAL MEDICINE & REHABILITATION

## 2022-04-27 ASSESSMENT — PAIN SCALES - GENERAL: PAINLEVEL: SEVERE PAIN (6)

## 2022-04-27 NOTE — LETTER
4/27/2022         RE: Leny Jfefrey  [address] [address]        Dear Colleague,    Thank you for referring your patient, Leny Jeffrey, to the [facility]. Please see a copy of my visit note below.    Assessment:   Leny Jeffrey is a 41 year old y.o. female with past medical history significant for depression, migraine headaches, fibromyalgia who presents today for follow-up regarding chronic bilateral low back pain (left greater than right) secondary to sacroiliac joint pain.  is status post bilateral sacroiliac joint injections on April 12, 2022.  At this time, she did not have any relief of her pain and is stating that the majority of her pain is on the left side and she does feel like there is worsening in the form of more general achiness in the surrounding low back area.  Her pain may be from lumbar facet joint syndrome secondary to lumbar facet arthropathy.  She is neurologically intact and without any red flag symptoms.    PSP:  Dr. Humphreys       Plan:     A shared decision making plan was used.  The patient's values and choices were respected.  The following represents what was discussed and decided upon by the physician and the patient.      1.  DIAGNOSTIC TESTS:   -I did consider getting an x-ray of the lumbar spine to help clarify her transitional anatomy given that her MRI of the lumbar spine does not match the report of the MRI of the pelvis.  However she is worried about cost at this point as she does have of previous medical bills that she is trying to pay.  At this time I feel comfortable going with the MRI report of the lumbar spine, as he could see the ribs in that report and they could not see them in the MRI of the pelvis.  We will go with the anatomy of a lumbarized S1 with a vestigial disc at S1-2.  - The patient's MRI of the lumbar spine from February 29, 2020 was personally reviewed today by the physician's physician performing her own  interpretation.  There is slight disc bulge at the L3-4 level but this does not cause any nerve root impingement, central canal stenosis or foraminal stenosis.  There is a disc bulge at the L4-5 level.  There is also facet arthropathy with ligamentum flavum thickening at this level as well.  There is no significant central canal stenosis or foraminal stenosis at L4-5.  There is disc degeneration seen at the L5-S1 level with facet hypertrophy.  This does cause mild lateral recess stenosis and left greater than right foraminal stenosis.  There is no high-grade foraminal stenosis or central canal stenosis seen.  The MRI of the lumbar spine states there is a lumbarized S1 with a vestigial disc at S1-2  -Patient's MRI of the pelvis from October 9, 2021 was personally reviewed today by the physician of the physician performing her own interpretation.  There is no inflammatory sacroiliitis.  There is partial disc degeneration at the L4-5 level with some edema in the left inferior L4 endplate.  Per the radiology report, there is transitional anatomy with a sacralized L5.  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  2.  PHYSICAL THERAPY: The patient was referred to physical therapy at her last visit.  At this time, she has completed 4-5 sessions.  I explained that she is welcome to continue to go to physical therapy or she can stop if she feels confident she knows how to do her home exercise program  3.  MEDICATIONS:    -At the last visit, meloxicam 15 mg once a day as needed for pain was prescribed.  At this time, she has stopped taking it as she states that it was not effective.  I do not have an alternative for her at this time and she is not looking for a new medication at this time.  -She has tried sertraline, duloxetine, gabapentin.  She had side effects with sertraline and duloxetine and the gabapentin  was ineffective.  -She has tried tizanidine but this was also ineffective.  -He had side effects to Savella.  4.  INTERVENTIONS: I did discuss a left L3, 4, 5 medial branch block to determine if her pain is coming from the facet joints at the L4-5 and L5-S1 level.  She would like to move forward with this.  -If she fails to have relief with these injections, then I would recommend a left L5 transverse process and sacral pseudoarticulation injection.  -She is status post bilateral SI joint injections on April 12, 2022.  She milligrams pain on the right side, but she continues to have pain in the left side stating that there was absolutely no relief of the left-sided pain  5.  PATIENT EDUCATION:    -I discussed the diagnosis of the pseudoarticulation.  I discussed the possibility of the lumbar facet joints causing her pain.  -All of her questions were answered to her satisfaction today.  She was in agreement with the treatment plan.  6.  FOLLOW-UP: Patient follow-up in 1 week for the medial branch blocks if there are any questions/concerns or any significant worsening of pain prior to that time, the patient is asked to call the clinic via the nurse navigation line or via [email or web address].     Subjective:     Leny Jeffrey is a 41 year old female who presents today for follow-up regarding midline and left-sided low back pain without radicular/sciatic type leg symptoms.  The patient is status post bilateral sacroiliac joint injections on April 12, 2022.  She reports that she really does not have any pain in the right side at this time.  Is just in the midline and then going off into the left low back.  It does not go down the leg.  She denies any numbness tingling or weakness in the leg.  She rates her pain today at a 6 out of 10.  At worst it is a 9 out of 10.  At best is a 5 out of 10.  Her pain is worse with standing for long periods of time.  Laying flat on her stomach or flat on her back will aggravate her pain.  Impact such  as jumping or lifting anything over 10 pounds can aggravate her pain.  She notices that if she lays in bed and if she has to reach to her right side, that this will cause really severe pain.  She says that nothing really makes it better at this time.  She has to change positions frequently, but the pain never completely goes away.  She feels like she has a little more aching in the surrounding low back area.  She has gone to 4-5 sessions of physical therapy and is doing her exercises on a regular basis.  She is questioning if she should continue to go to physical therapy.  She did not really find the meloxicam helpful.  She is wondering if there is anything else that can be done.    Past medical history is reviewed and is unchanged for any new medical diagnoses in the interim.      Family history is reviewed and is unchanged in the interim.      Review of Systems:  Positive for occasional headaches.  Pertinent negatives include no numbness, tingling, weakness, fevers, chills, unexplained weight loss, bowel incontinence, bladder incontinence, trips, stumbles, falls.  All others reviewed and are negative.     Objective:   CONSTITUTIONAL:  Vital signs as above.  No acute distress.  The patient is well nourished and well groomed.    PSYCHIATRIC:  The patient is awake, alert, oriented to person, place and time.  The patient is answering questions appropriately with clear speech.  Normal affect.  SKIN:  Skin over the face, posterior torso, bilateral upper and lower extremities is clean, dry, intact without rashes.  MUSCULOSKELETAL:  Gait is non-antalgic.  The patient is able to heel and toe walk without any difficulty.  She points to pain over the area of the SI joint on the left side.  However the area is not tender to palpation.  I am unable to reproduce pain with palpating over the general area.  She does report pain with lumbar flexion.  She does have pain with lumbar extension, but the 2 pains are slightly different.   She does not have any pain with bilateral lumbar sidebending.  She has minimal pain with bilateral trunk rotation.  She does have significant pain with both left and right lumbar facet loading (simultaneous extension rotation).     The patient has 5/5 strength for the bilateral hip flexors, knee flexors/extensors, ankle dorsiflexors/plantar flexors, ankle evertors/invertors.    NEUROLOGICAL: 2/4 patellar, medial hamstring, achilles reflexes which are symmetric bilaterally.  No ankle clonus bilaterally.  Sensation to light touch is intact in the bilateral L4, L5, and S1 dermatomes.               Again, thank you for allowing me to participate in the care of your patient.        Sincerely,        Florencia Nichols, DO

## 2022-04-27 NOTE — PATIENT INSTRUCTIONS
Pavel Michele L3, L4, L5 medial branch block injections have been ordered today.  These are diagnostic injections only.  They do not use any steroid.  They will help us to know if your pain is coming from the L4-5 and L5-S1 facet joints in your back.  If you do not have relief with these injections, then I would think that that pseudoarticulation is causing your pain.    Please schedule this injection at least 1 week  from now to allow time for insurance prior authorization.  On the day of your injection, you cannot be sick or taking antibiotics.  If you become sick and antibiotics are prescribed, please call the clinic so your injection can be rescheduled for once you have completed your antibiotics.  You will need to bring a  with you for your injection.   If you have any questions or concerns prior to your injection, please do not hesitate to call the nurse navigation line at [phone] or contact me through [employer].    Thanks, Leny!  Dr. Humphreys

## 2022-05-27 DIAGNOSIS — M47.816 LUMBAR FACET JOINT SYNDROME: Primary | ICD-10-CM

## 2022-05-30 DIAGNOSIS — M47.816 LUMBAR SPONDYLOSIS: ICD-10-CM

## 2022-05-30 DIAGNOSIS — M47.816 LUMBAR FACET JOINT SYNDROME: Primary | ICD-10-CM

## 2022-05-31 DIAGNOSIS — G89.29 CHRONIC LEFT-SIDED LOW BACK PAIN WITHOUT SCIATICA: ICD-10-CM

## 2022-05-31 DIAGNOSIS — M47.816 LUMBAR SPONDYLOSIS: ICD-10-CM

## 2022-05-31 DIAGNOSIS — M47.816 LUMBAR FACET JOINT SYNDROME: Primary | ICD-10-CM

## 2022-05-31 DIAGNOSIS — M54.50 CHRONIC LEFT-SIDED LOW BACK PAIN WITHOUT SCIATICA: ICD-10-CM

## 2022-05-31 NOTE — ADDENDUM NOTE
Encounter addended by: Florencia Monroe, PT on: 5/31/2022 8:04 AM   Actions taken: Clinical Note Signed, Episode resolved

## 2022-06-06 ENCOUNTER — HOSPITAL ENCOUNTER (OUTPATIENT)
Dept: MAMMOGRAPHY | Facility: CLINIC | Age: 42
Discharge: HOME | End: 2022-06-06
Attending: FAMILY MEDICINE | Admitting: FAMILY MEDICINE
Payer: COMMERCIAL

## 2022-06-06 DIAGNOSIS — Z12.31 VISIT FOR SCREENING MAMMOGRAM: ICD-10-CM

## 2022-06-06 PROCEDURE — 77067 SCR MAMMO BI INCL CAD: CPT

## 2022-06-20 DIAGNOSIS — G43.709 CHRONIC MIGRAINE WITHOUT AURA WITHOUT STATUS MIGRAINOSUS, NOT INTRACTABLE: Primary | ICD-10-CM

## 2022-06-21 RX ORDER — TOPIRAMATE 100 MG/1
100 TABLET, FILM COATED ORAL AT BEDTIME
Qty: 90 TABLET | Refills: 1 | Status: SHIPPED | OUTPATIENT
Start: 2022-06-21 | End: 2023-02-10 | Stop reason: DRUGHIGH

## 2022-06-21 NOTE — TELEPHONE ENCOUNTER
Rx Authorization:  Requested Medication/ Dose Topiramate oral Tablet 100 mg  Date last refill ordered: 9/24/21  Quantity ordered: 90  # refills: 2  Date of last clinic visit with ordering provider: 9/24/21  Date of next clinic visit with ordering provider:   All pertinent protocol data (lab date/result):   Include pertinent information from patients message:

## 2022-07-19 ENCOUNTER — RADIOLOGY INJECTION OFFICE VISIT (OUTPATIENT)
Dept: PHYSICAL MEDICINE AND REHAB | Facility: CLINIC | Age: 42
End: 2022-07-19
Attending: PHYSICIAN ASSISTANT
Payer: COMMERCIAL

## 2022-07-19 VITALS
HEART RATE: 71 BPM | SYSTOLIC BLOOD PRESSURE: 86 MMHG | OXYGEN SATURATION: 98 % | RESPIRATION RATE: 16 BRPM | DIASTOLIC BLOOD PRESSURE: 68 MMHG | TEMPERATURE: 98 F

## 2022-07-19 DIAGNOSIS — M47.816 LUMBAR FACET JOINT SYNDROME: ICD-10-CM

## 2022-07-19 PROCEDURE — 64493 INJ PARAVERT F JNT L/S 1 LEV: CPT | Mod: KX | Performed by: PHYSICAL MEDICINE & REHABILITATION

## 2022-07-19 PROCEDURE — 64494 INJ PARAVERT F JNT L/S 2 LEV: CPT | Mod: KX | Performed by: PHYSICAL MEDICINE & REHABILITATION

## 2022-07-19 PROCEDURE — A9576 INJ PROHANCE MULTIPACK: HCPCS | Performed by: PHYSICAL MEDICINE & REHABILITATION

## 2022-07-19 RX ORDER — BUPIVACAINE HYDROCHLORIDE 7.5 MG/ML
INJECTION, SOLUTION EPIDURAL; RETROBULBAR
Status: COMPLETED | OUTPATIENT
Start: 2022-07-19 | End: 2022-07-19

## 2022-07-19 RX ADMIN — BUPIVACAINE HYDROCHLORIDE 1.5 ML: 7.5 INJECTION, SOLUTION EPIDURAL; RETROBULBAR at 15:59

## 2022-07-19 ASSESSMENT — PAIN SCALES - GENERAL
PAINLEVEL: EXTREME PAIN (8)
PAINLEVEL: EXTREME PAIN (8)

## 2022-07-19 NOTE — PATIENT INSTRUCTIONS
Please complete your pain diary and return the diary to the [facility] at your earliest convenience.  The [facility] will contact you to schedule your next appointment after your pain diary is reviewed by your doctor.  Thank you.    DISCHARGE INSTRUCTIONS    During office hours (8:00 a.m.- 4:00 p.m.) questions or concerns may be answered  by calling [facility] Center Navigation Nurses at  [phone].  Messages received after hours will be returned the following business day.      In the case of an emergency, please dial 911 or seek assistance at the nearest Emergency Room/Urgent Care facility.     All Patients:  You may experience an increase in your symptoms for the first 2 days, once the numbing medication wears off.    You may resume your regular medication, no pain medication until you have completed your diary    You may shower. No swimming, tub bath or hot tub for 24 hours; remove bandage after 4 hours    Continue your activities that can cause you pain to test the blocks.                         You should not drive for the next 3 to 5 hours (have someone drive you)           POSSIBLE PROCEDURE SIDE EFFECTS  -Call Spine Center if concerned-    Increased Pain  Increased numbness/tingling     Nausea/Vomiting  Bruising/bleeding at site (hematoma)             Swelling at site (edema) Headache  Difficulty walking  Infection        Fever greater than 100.5

## 2022-07-20 ENCOUNTER — TELEPHONE (OUTPATIENT)
Dept: PHYSICAL MEDICINE AND REHAB | Facility: CLINIC | Age: 42
End: 2022-07-20

## 2022-07-22 ENCOUNTER — TELEPHONE (OUTPATIENT)
Dept: PHYSICAL MEDICINE AND REHAB | Facility: CLINIC | Age: 42
End: 2022-07-22

## 2022-07-22 DIAGNOSIS — M47.816 SPONDYLOSIS OF LUMBAR REGION WITHOUT MYELOPATHY OR RADICULOPATHY: Primary | ICD-10-CM

## 2022-07-22 NOTE — TELEPHONE ENCOUNTER
Attempted to contact patient, but she was unreachable at this time.  Message left for patient informing her that Dr. Humphreys was recommending that she proceed with a left L5-S1 facet joint injection (which will actually be done at the L5 transverse process and sacral pseudoarticulation) given she failed the lumbar medial branch blocks that were recently completed.    The message stated that she would be contacted to schedule the procedure once the authorization had been received from her insurance provider.    She was encouraged to contact the [facility] if she had any questions or concerns regarding the recommendations.

## 2022-07-28 DIAGNOSIS — M47.816 SPONDYLOSIS OF LUMBAR REGION WITHOUT MYELOPATHY OR RADICULOPATHY: Primary | ICD-10-CM

## 2022-08-08 ENCOUNTER — RADIOLOGY INJECTION OFFICE VISIT (OUTPATIENT)
Dept: PHYSICAL MEDICINE AND REHAB | Facility: CLINIC | Age: 42
End: 2022-08-08
Attending: PHYSICAL MEDICINE & REHABILITATION
Payer: COMMERCIAL

## 2022-08-08 VITALS
DIASTOLIC BLOOD PRESSURE: 64 MMHG | HEART RATE: 64 BPM | OXYGEN SATURATION: 96 % | RESPIRATION RATE: 16 BRPM | SYSTOLIC BLOOD PRESSURE: 96 MMHG

## 2022-08-08 DIAGNOSIS — M47.816 SPONDYLOSIS OF LUMBAR REGION WITHOUT MYELOPATHY OR RADICULOPATHY: ICD-10-CM

## 2022-08-08 PROCEDURE — 64493 INJ PARAVERT F JNT L/S 1 LEV: CPT | Mod: LT | Performed by: PHYSICAL MEDICINE & REHABILITATION

## 2022-08-08 RX ORDER — ROPIVACAINE HYDROCHLORIDE 5 MG/ML
INJECTION, SOLUTION EPIDURAL; INFILTRATION; PERINEURAL
Status: COMPLETED | OUTPATIENT
Start: 2022-08-08 | End: 2022-08-08

## 2022-08-08 RX ORDER — METHYLPREDNISOLONE ACETATE 40 MG/ML
INJECTION, SUSPENSION INTRA-ARTICULAR; INTRALESIONAL; INTRAMUSCULAR; SOFT TISSUE
Status: COMPLETED | OUTPATIENT
Start: 2022-08-08 | End: 2022-08-08

## 2022-08-08 RX ORDER — LIDOCAINE HYDROCHLORIDE 10 MG/ML
INJECTION, SOLUTION EPIDURAL; INFILTRATION; INTRACAUDAL; PERINEURAL
Status: COMPLETED | OUTPATIENT
Start: 2022-08-08 | End: 2022-08-08

## 2022-08-08 RX ADMIN — ROPIVACAINE HYDROCHLORIDE 1.5 ML: 5 INJECTION, SOLUTION EPIDURAL; INFILTRATION; PERINEURAL at 15:50

## 2022-08-08 RX ADMIN — METHYLPREDNISOLONE ACETATE 20 MG: 40 INJECTION, SUSPENSION INTRA-ARTICULAR; INTRALESIONAL; INTRAMUSCULAR; SOFT TISSUE at 15:50

## 2022-08-08 ASSESSMENT — PAIN SCALES - GENERAL
PAINLEVEL: EXTREME PAIN (8)
PAINLEVEL: EXTREME PAIN (8)

## 2022-08-08 NOTE — PATIENT INSTRUCTIONS
Follow-up visit with Dr. Humphreys in 2 weeks to discuss injection outcome and determine care plan going forward.       DISCHARGE INSTRUCTIONS    During office hours (8:00 a.m.- 4:00 p.m.) questions or concerns may be answered  by calling [facility] Navigation Nurses at  [phone].  Messages received after hours will be returned the following business day.      In the case of an emergency, please dial 911 or seek assistance at the nearest Emergency Room/Urgent Care facility.     All Patients:    You may experience an increase in your symptoms for the first 2 days (It may take anywhere between 2 days- 2 weeks for the steroid to have maximum effect).    You may use ice on the injection site, as frequently as 20 minutes each hour if needed.    You may take your pain medicine.    You may continue taking your regular medication after your injection. If you have had a Medial Branch Block you may resume pain medication once your pain diary is completed.    You may shower. No swimming, tub bath or hot tub for 48 hours.  You may remove your bandaid/bandage as soon as you are home.    You may resume light activities, as tolerated.    Resume your usual diet as tolerated.    It is strongly advised that you do not drive for 1-3 hours post injection.    If you have had oral sedation:  Do not drive for 8 hours post injection.      If you have had IV sedation:  Do not drive for 24 hours post injection.  Do not operate hazardous machinery or make important personal/business decisions for 24 hours.      POSSIBLE STEROID SIDE EFFECTS (If steroid/cortisone was used for your procedure)    -If you experience these symptoms, it should only last for a short period    Swelling of the legs              Skin redness (flushing)     Mouth (oral) irritation   Blood sugar (glucose) levels            Sweats                    Mood changes  Headache  Sleeplessness  Weakened immune system for up to 14 days, which could increase the risk of  naomy the COVID-19 virus and/or experiencing more severe symptoms of the disease, if exposed.  Decreased effectiveness of the flu vaccine if given within 2 weeks of the steroid.         POSSIBLE PROCEDURE SIDE EFFECTS  -Call the Spine Center if you are concerned  Increased Pain           Increased numbness/tingling      Nausea/Vomiting          Bruising/bleeding at site      Redness or swelling                                              Difficulty walking      Weakness           Fever greater than 100.5    *In the event of a severe headache after an epidural steroid injection that is relieved by lying down, please call the [facility] to speak with a clinical staff member*

## 2022-08-31 NOTE — PROGRESS NOTES
Assessment:   Leny Jeffrey is a 42 year old y.o. female with past medical history significant for depression, migraine headaches, fibromyalgia who presents today for follow-up regarding chronic severe bilateral low back pain (left greater than right) of unknown etiology but potentially thought to be due to lumbar facet joint syndrome from lumbar spondylosis and a pseudoarticulation versus sacroiliac joint pain. She is most recently status post a left L5-S1 pseudoarticulation injection on August 8, 2022.  At this time she reports that she did not have any relief whatsoever not even short-term.  She continues to have pain in that same area, but it is now radiating into the left groin area and she feels that the hip may be affected.  She has failed to have relief with left L3, L4, 5 medial branch blocks as well as sacroiliac joint injections. She remains neurologically intact and without any red flag symptoms.    PSP:  Dr. Humphreys       Plan:     A shared decision making plan was used.  The patient's values and choices were respected.  The following represents what was discussed and decided upon by the physician and the patient.      1.  DIAGNOSTIC TESTS:    -I did order an HLA-B27 test at her request.  - Patient has transitional anatomy with a lumbarized S1 and a vestigial disc at S1-2.  -The patient's MRI of the lumbar spine from February 29, 2020 was personally reviewed today by the physician with physician performing her own interpretation.  There is a disc bulge at the L3-4 level but this does not cause any nerve root impingement, central canal stenosis, or foraminal stenosis.  There is a disc bulge at the L4-5 level.  There is also facet arthropathy with ligamentum flavum thickening at this level as well.  There is no significant central canal stenosis or foraminal stenosis at the L4-5 level.  There is disc degeneration seen at the L5-S1 level with facet hypertrophy.  This does cause mild lateral recess stenosis  and left greater than right foraminal stenosis.  There is no high-grade foraminal stenosis or central canal stenosis seen.  -Patient did have an MRI of the pelvis from October 9, 2021.  The report was reviewed today and is summarized as follows: No inflammatory sacroiliitis.  There is partial disc degeneration at the L4-5 level with some edema in the left inferior L4 endplate.  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  2.  PHYSICAL THERAPY: Patient has completed physical therapy in the spring 2022.  She is encouraged to continue doing her home exercise program on a regular basis.  3.  MEDICATIONS: No further medications are recommended at this time.  -She had previously discontinued meloxicam secondary to ineffectiveness.  -She previously discontinued tizanidine secondary to ineffectiveness.  -She previously discontinued gabapentin as she said it was ineffective.  -She has discontinued sertraline, duloxetine, Savella secondary to side effects.  4.  INTERVENTIONS:   -I did offer osteopathic manipulation to see if this would help.  She is interested in trying this.  She can schedule an appointment first available to try this type of treatment.  - No further injections are recommended at this time.  -She is status post a left L5-S1 pseudoarticulation injection (left L5 transverse process articulating with the sacrum) on August 8, 2022.  At this time, she states that she did not get any relief, not even temporarily.  -She had previously failed to have relief with left L3, 4, L5 medial branch blocks on July 19, 2022  -She had failed to have relief with bilateral SI joint injections on April 12, 2022.  5.  PATIENT EDUCATION:    -I did discuss that she could contact her rheumatologist to see if it is possible that she has dermatomyositis.  This is the only other thing that I can think of that would potentially  cause pain and weakness in addition to skin issues, if her rheumatologist thinks that lupus is not the cause  -I discussed that I can talk with the surgeon to see if the pseudoarticulation would be something that a surgeon could remove.  However I am hesitant to have her see a surgeon because she did not even have temporary relief with the injection.  However I will see if that is even a possibility with something that they can perform surgically.  -All of her questions were answered to her satisfaction today.  She was in agreement the treatment plan.  6.  FOLLOW-UP: The patient follow-up first available for the osteopathic manipulation.  If there are any questions/concerns or any significant worsening of pain prior to that time, the patient is asked to call the clinic via the nurse navigation line or via [address].     Subjective:     Leny Jeffrey is a 42 year old female who presents today for follow-up regarding chronic bilateral low back pain without radicular/sciatic type leg symptoms.  She is status post a left L5-S1 pseudoarticulation injection on August 8, 2022.  At this time, she states that she did not get any relief whatsoever, not even temporarily from the injection.  She continues to have pain in the left low back.  She is now getting pain rating around into the left groin and she feels like the left groin is weak.  She does feel like in general she does have some weakness.  Says that she can still do some higher level activities like planking, but then at times that she says she has a difficult time even rolling over in bed.  In general she rates her pain at an 8 out of 10.  At worst is a 9 out of 10.  At best is a 7 out of 10.  Her pain is worse with bending and standing.  Laying down in the same position for more than 10 minutes can aggravate things.  She really has not been able to find much relief.  She denies any new symptoms at this time.  She is completed physical therapy.  She is not taking any  medications as she has not found any to be helpful.    Does state that she saw a rheumatologist.  Her MICHELLE was elevated and he thought that potentially she had lupus.  However there was no organ damage that is typically seen with lupus.  She says that she ruled out ankylosing spondylitis but states that they never ordered an HLA-B27 so she would really like to have that ordered if at all possible.  She says they did discuss hydroxychloroquine but it was never actually prescribed for her.    Past medical history is reviewed and is unchanged for any new medical diagnoses in the interim.      Family history is reviewed and is unchanged in the interim.      Review of Systems:  Positive for sensation of weakness, positive for occasional headaches.  She does also sometimes have a rash on her skin..  Pertinent negatives include no numbness or tingling, fevers, chills, unexplained weight loss, bowel incontinence, bladder incontinence, trips, stumbles, falls.  All others reviewed and are negative.     Objective:   CONSTITUTIONAL:  Vital signs as above.  No acute distress.  The patient is well nourished and well groomed.    PSYCHIATRIC:  The patient is awake, alert, oriented to person, place and time.  The patient is answering questions appropriately with clear speech.  Normal affect.  SKIN:  Skin over the face, posterior torso, bilateral upper and lower extremities is clean, dry, intact without rashes.  MUSCULOSKELETAL:  Gait is non-antalgic.  The patient is able to heel and toe walk without any difficulty.  Mild tenderness over the left SI joint area and at the base of the sacrum.     The patient has 5/5 strength for the bilateral hip flexors, knee flexors/extensors, ankle dorsiflexors/plantar flexors, ankle evertors/invertors.  She does report that she has pain with hip flexor strength testing.  Fabere's test is negative on the left side.  NEUROLOGICAL: 2/4 patellar, medial hamstring, achilles reflexes which are symmetric  bilaterally.  No ankle clonus bilaterally.  Sensation to light touch is intact in the bilateral L4, L5, and S1 dermatomes.

## 2022-09-01 ENCOUNTER — OFFICE VISIT (OUTPATIENT)
Dept: PHYSICAL MEDICINE AND REHAB | Facility: CLINIC | Age: 42
End: 2022-09-01
Payer: COMMERCIAL

## 2022-09-01 ENCOUNTER — LAB (OUTPATIENT)
Dept: LAB | Facility: HOSPITAL | Age: 42
End: 2022-09-01
Payer: COMMERCIAL

## 2022-09-01 VITALS — HEART RATE: 76 BPM | TEMPERATURE: 98.2 F | SYSTOLIC BLOOD PRESSURE: 115 MMHG | DIASTOLIC BLOOD PRESSURE: 63 MMHG

## 2022-09-01 DIAGNOSIS — M53.3 SACROILIAC JOINT PAIN: ICD-10-CM

## 2022-09-01 DIAGNOSIS — M53.3 SACROILIAC JOINT PAIN: Primary | ICD-10-CM

## 2022-09-01 DIAGNOSIS — M54.50 CHRONIC BILATERAL LOW BACK PAIN WITHOUT SCIATICA: ICD-10-CM

## 2022-09-01 DIAGNOSIS — M47.816 LUMBAR FACET JOINT SYNDROME: ICD-10-CM

## 2022-09-01 DIAGNOSIS — G89.29 CHRONIC BILATERAL LOW BACK PAIN WITHOUT SCIATICA: ICD-10-CM

## 2022-09-01 DIAGNOSIS — M47.816 SPONDYLOSIS OF LUMBAR REGION WITHOUT MYELOPATHY OR RADICULOPATHY: ICD-10-CM

## 2022-09-01 PROCEDURE — 99214 OFFICE O/P EST MOD 30 MIN: CPT | Performed by: PHYSICAL MEDICINE & REHABILITATION

## 2022-09-01 PROCEDURE — 81374 HLA I TYPING 1 ANTIGEN LR: CPT

## 2022-09-01 PROCEDURE — 36415 COLL VENOUS BLD VENIPUNCTURE: CPT

## 2022-09-01 ASSESSMENT — PAIN SCALES - GENERAL: PAINLEVEL: EXTREME PAIN (8)

## 2022-09-01 NOTE — LETTER
9/1/2022         RE: Lney Jeffrey  [address] [address]        Dear Colleague,    Thank you for referring your patient, Leny Jeffrey, to the [facility]. Please see a copy of my visit note below.    Assessment:   Leny Jeffrey is a 42 year old y.o. female with past medical history significant for depression, migraine headaches, fibromyalgia who presents today for follow-up regarding chronic severe bilateral low back pain (left greater than right) of unknown etiology but potentially thought to be due to lumbar facet joint syndrome from lumbar spondylosis and a pseudoarticulation versus sacroiliac joint pain. She is most recently status post a left L5-S1 pseudoarticulation injection on August 8, 2022.  At this time she reports that she did not have any relief whatsoever not even short-term.  She continues to have pain in that same area, but it is now radiating into the left groin area and she feels that the hip may be affected.  She has failed to have relief with left L3, L4, 5 medial branch blocks as well as sacroiliac joint injections. She remains neurologically intact and without any red flag symptoms.    PSP:  Dr. Humphreys       Plan:     A shared decision making plan was used.  The patient's values and choices were respected.  The following represents what was discussed and decided upon by the physician and the patient.      1.  DIAGNOSTIC TESTS:    -I did order an HLA-B27 test at her request.  - Patient has transitional anatomy with a lumbarized S1 and a vestigial disc at S1-2.  -The patient's MRI of the lumbar spine from February 29, 2020 was personally reviewed today by the physician with physician performing her own interpretation.  There is a disc bulge at the L3-4 level but this does not cause any nerve root impingement, central canal stenosis, or foraminal stenosis.  There is a disc bulge at the L4-5 level.  There is also facet arthropathy with ligamentum  Pt c/o tripping and falling into table Pt reports hitting head during fall Neuro checks intact      [facility] Katharine, RN  03/20/18 8039 flavum thickening at this level as well.  There is no significant central canal stenosis or foraminal stenosis at the L4-5 level.  There is disc degeneration seen at the L5-S1 level with facet hypertrophy.  This does cause mild lateral recess stenosis and left greater than right foraminal stenosis.  There is no high-grade foraminal stenosis or central canal stenosis seen.  -Patient did have an MRI of the pelvis from October 9, 2021.  The report was reviewed today and is summarized as follows: No inflammatory sacroiliitis.  There is partial disc degeneration at the L4-5 level with some edema in the left inferior L4 endplate.  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  2.  PHYSICAL THERAPY: Patient has completed physical therapy in the spring 2022.  She is encouraged to continue doing her home exercise program on a regular basis.  3.  MEDICATIONS: No further medications are recommended at this time.  -She had previously discontinued meloxicam secondary to ineffectiveness.  -She previously discontinued tizanidine secondary to ineffectiveness.  -She previously discontinued gabapentin as she said it was ineffective.  -She has discontinued sertraline, duloxetine, Savella secondary to side effects.  4.  INTERVENTIONS:   -I did offer osteopathic manipulation to see if this would help.  She is interested in trying this.  She can schedule an appointment first available to try this type of treatment.  - No further injections are recommended at this time.  -She is status post a left L5-S1 pseudoarticulation injection (left L5 transverse process articulating with the sacrum) on August 8, 2022.  At this time, she states that she did not get any relief, not even temporarily.  -She had previously failed to have relief with left L3, 4, L5 medial branch blocks on July 19, 2022  -She had failed to have relief with bilateral SI  joint injections on April 12, 2022.  5.  PATIENT EDUCATION:    -I did discuss that she could contact her rheumatologist to see if it is possible that she has dermatomyositis.  This is the only other thing that I can think of that would potentially cause pain and weakness in addition to skin issues, if her rheumatologist thinks that lupus is not the cause  -I discussed that I can talk with the surgeon to see if the pseudoarticulation would be something that a surgeon could remove.  However I am hesitant to have her see a surgeon because she did not even have temporary relief with the injection.  However I will see if that is even a possibility with something that they can perform surgically.  -All of her questions were answered to her satisfaction today.  She was in agreement the treatment plan.  6.  FOLLOW-UP: The patient follow-up first available for the osteopathic manipulation.  If there are any questions/concerns or any significant worsening of pain prior to that time, the patient is asked to call the clinic via the nurse navigation line or via [employer]     Subjective:     Leny Jeffrey is a 42 year old female who presents today for follow-up regarding chronic bilateral low back pain without radicular/sciatic type leg symptoms.  She is status post a left L5-S1 pseudoarticulation injection on August 8, 2022.  At this time, she states that she did not get any relief whatsoever, not even temporarily from the injection.  She continues to have pain in the left low back.  She is now getting pain rating around into the left groin and she feels like the left groin is weak.  She does feel like in general she does have some weakness.  Says that she can still do some higher level activities like planking, but then at times that she says she has a difficult time even rolling over in bed.  In general she rates her pain at an 8 out of 10.  At worst is a 9 out of 10.  At best is a 7 out of 10.  Her pain is worse with bending  and standing.  Laying down in the same position for more than 10 minutes can aggravate things.  She really has not been able to find much relief.  She denies any new symptoms at this time.  She is completed physical therapy.  She is not taking any medications as she has not found any to be helpful.    Does state that she saw a rheumatologist.  Her MICHELLE was elevated and he thought that potentially she had lupus.  However there was no organ damage that is typically seen with lupus.  She says that she ruled out ankylosing spondylitis but states that they never ordered an HLA-B27 so she would really like to have that ordered if at all possible.  She says they did discuss hydroxychloroquine but it was never actually prescribed for her.    Past medical history is reviewed and is unchanged for any new medical diagnoses in the interim.      Family history is reviewed and is unchanged in the interim.      Review of Systems:  Positive for sensation of weakness, positive for occasional headaches.  She does also sometimes have a rash on her skin..  Pertinent negatives include no numbness or tingling, fevers, chills, unexplained weight loss, bowel incontinence, bladder incontinence, trips, stumbles, falls.  All others reviewed and are negative.     Objective:   CONSTITUTIONAL:  Vital signs as above.  No acute distress.  The patient is well nourished and well groomed.    PSYCHIATRIC:  The patient is awake, alert, oriented to person, place and time.  The patient is answering questions appropriately with clear speech.  Normal affect.  SKIN:  Skin over the face, posterior torso, bilateral upper and lower extremities is clean, dry, intact without rashes.  MUSCULOSKELETAL:  Gait is non-antalgic.  The patient is able to heel and toe walk without any difficulty.  Mild tenderness over the left SI joint area and at the base of the sacrum.     The patient has 5/5 strength for the bilateral hip flexors, knee flexors/extensors, ankle  dorsiflexors/plantar flexors, ankle evertors/invertors.  She does report that she has pain with hip flexor strength testing.  Fabere's test is negative on the left side.  NEUROLOGICAL: 2/4 patellar, medial hamstring, achilles reflexes which are symmetric bilaterally.  No ankle clonus bilaterally.  Sensation to light touch is intact in the bilateral L4, L5, and S1 dermatomes.               Again, thank you for allowing me to participate in the care of your patient.        Sincerely,        Florencia Nichols, DO

## 2022-09-01 NOTE — PATIENT INSTRUCTIONS
Leny,    I am sorry that you did not have relief with the injection.  It is possible that that pseudoarticulation is still causing pain.  I will discuss your case with a surgeon to see if it is even possible to separate the pseudoarticulation, removing part of the bone there to see if that would potentially help with the pain.    I did order an HLA-B27 test.  You can go to [facility] to have this drawn.  You could try having it drawn at the specialty [facility] if you want, as I do believe that they have lab there.  However because it is a specialized test, they may end up sending you to the hospital, but it may be worth checking if you have a higher co-pay at a hospital compared to a clinic.    I am wondering if you may have dermatomyositis.  You can look at this diagnosis online to see if it fits your symptoms and you can also discuss this with your rheumatologist.    I do think that you may benefit from osteopathic manipulation.  You can schedule an appointment with me to try this type of treatment.  Please me know if you have any questions or concerns before then.    Thanks!  Dr. Humphreys

## 2022-09-06 LAB
B LOCUS: NORMAL
B27TEST METHOD: NORMAL

## 2022-09-13 DIAGNOSIS — M47.816 SPONDYLOSIS OF LUMBAR REGION WITHOUT MYELOPATHY OR RADICULOPATHY: ICD-10-CM

## 2022-09-13 DIAGNOSIS — M53.3 SACROILIAC JOINT PAIN: Primary | ICD-10-CM

## 2022-09-14 ENCOUNTER — OFFICE VISIT (OUTPATIENT)
Dept: INTERNAL MEDICINE | Facility: CLINIC | Age: 42
End: 2022-09-14
Payer: COMMERCIAL

## 2022-09-14 ENCOUNTER — HOSPITAL ENCOUNTER (OUTPATIENT)
Dept: ULTRASOUND IMAGING | Facility: CLINIC | Age: 42
Discharge: HOME | End: 2022-09-14
Attending: INTERNAL MEDICINE | Admitting: INTERNAL MEDICINE
Payer: COMMERCIAL

## 2022-09-14 VITALS
HEIGHT: 64 IN | WEIGHT: 166 LBS | DIASTOLIC BLOOD PRESSURE: 72 MMHG | HEART RATE: 64 BPM | SYSTOLIC BLOOD PRESSURE: 108 MMHG | OXYGEN SATURATION: 98 % | BODY MASS INDEX: 28.34 KG/M2

## 2022-09-14 DIAGNOSIS — R22.1 NODULE OF NECK: Primary | ICD-10-CM

## 2022-09-14 DIAGNOSIS — R53.83 FATIGUE, UNSPECIFIED TYPE: ICD-10-CM

## 2022-09-14 DIAGNOSIS — R22.1 NODULE OF NECK: ICD-10-CM

## 2022-09-14 LAB
ANION GAP SERPL CALCULATED.3IONS-SCNC: 7 MMOL/L (ref 7–15)
BASOPHILS # BLD AUTO: 0 10E3/UL (ref 0–0.2)
BASOPHILS NFR BLD AUTO: 1 %
BUN SERPL-MCNC: 16.1 MG/DL (ref 6–20)
CALCIUM SERPL-MCNC: 9.2 MG/DL (ref 8.6–10)
CHLORIDE SERPL-SCNC: 106 MMOL/L (ref 98–107)
CREAT SERPL-MCNC: 1.05 MG/DL (ref 0.51–0.95)
DEPRECATED HCO3 PLAS-SCNC: 26 MMOL/L (ref 22–29)
EOSINOPHIL # BLD AUTO: 0.1 10E3/UL (ref 0–0.7)
EOSINOPHIL NFR BLD AUTO: 3 %
ERYTHROCYTE [DISTWIDTH] IN BLOOD BY AUTOMATED COUNT: 12.3 % (ref 10–15)
GFR SERPL CREATININE-BSD FRML MDRD: 68 ML/MIN/1.73M2
GLUCOSE SERPL-MCNC: 82 MG/DL (ref 70–99)
HCT VFR BLD AUTO: 43.5 % (ref 35–47)
HGB BLD-MCNC: 14.9 G/DL (ref 11.7–15.7)
IMM GRANULOCYTES # BLD: 0 10E3/UL
IMM GRANULOCYTES NFR BLD: 0 %
LYMPHOCYTES # BLD AUTO: 1.6 10E3/UL (ref 0.8–5.3)
LYMPHOCYTES NFR BLD AUTO: 33 %
MCH RBC QN AUTO: 30.5 PG (ref 26.5–33)
MCHC RBC AUTO-ENTMCNC: 34.3 G/DL (ref 31.5–36.5)
MCV RBC AUTO: 89 FL (ref 78–100)
MONOCYTES # BLD AUTO: 0.5 10E3/UL (ref 0–1.3)
MONOCYTES NFR BLD AUTO: 10 %
NEUTROPHILS # BLD AUTO: 2.7 10E3/UL (ref 1.6–8.3)
NEUTROPHILS NFR BLD AUTO: 54 %
PLATELET # BLD AUTO: 267 10E3/UL (ref 150–450)
POTASSIUM SERPL-SCNC: 4.2 MMOL/L (ref 3.4–5.3)
RBC # BLD AUTO: 4.89 10E6/UL (ref 3.8–5.2)
SODIUM SERPL-SCNC: 139 MMOL/L (ref 136–145)
TSH SERPL DL<=0.005 MIU/L-ACNC: 2.76 UIU/ML (ref 0.3–4.2)
WBC # BLD AUTO: 4.9 10E3/UL (ref 4–11)

## 2022-09-14 PROCEDURE — 99213 OFFICE O/P EST LOW 20 MIN: CPT | Performed by: INTERNAL MEDICINE

## 2022-09-14 PROCEDURE — 85025 COMPLETE CBC W/AUTO DIFF WBC: CPT | Performed by: INTERNAL MEDICINE

## 2022-09-14 PROCEDURE — 36415 COLL VENOUS BLD VENIPUNCTURE: CPT | Performed by: INTERNAL MEDICINE

## 2022-09-14 PROCEDURE — 84443 ASSAY THYROID STIM HORMONE: CPT | Performed by: INTERNAL MEDICINE

## 2022-09-14 PROCEDURE — 76536 US EXAM OF HEAD AND NECK: CPT

## 2022-09-14 PROCEDURE — 80048 BASIC METABOLIC PNL TOTAL CA: CPT | Performed by: INTERNAL MEDICINE

## 2022-09-14 ASSESSMENT — PATIENT HEALTH QUESTIONNAIRE - PHQ9
10. IF YOU CHECKED OFF ANY PROBLEMS, HOW DIFFICULT HAVE THESE PROBLEMS MADE IT FOR YOU TO DO YOUR WORK, TAKE CARE OF THINGS AT HOME, OR GET ALONG WITH OTHER PEOPLE: SOMEWHAT DIFFICULT
SUM OF ALL RESPONSES TO PHQ QUESTIONS 1-9: 7
SUM OF ALL RESPONSES TO PHQ QUESTIONS 1-9: 7

## 2022-09-14 NOTE — PATIENT INSTRUCTIONS
Your neck exam today was essentially normal.  There was a questionable density on the left side of your neck, but I suspect that is normal thyroid cartilage.  You will undergo an ultrasound of your neck, to rule out an enlarged lymph node.    Look on your [facility] portal for results of today's tests.    Follow-up with your primary care physician in January as planned.  Follow-up sooner with your primary care physician if you feel there is further changes or new neck nodules appear.

## 2022-09-14 NOTE — PROGRESS NOTES
Leny Jeffrey   42 year old female    Date of Visit: 9/14/2022    Chief Complaint   Patient presents with     lumps on neck     For a couple of months, not painful     Subjective  Leny is a 42-year-old female from outside clinic here with concerns about a lump in her neck on her left.    She had a concern about a right lump on her neck last September 2021, ultrasound at that time showed normal lymph nodes.    She has had some chronic fatigue and chronic back pain.  History of chronic headaches.  Did have a low back spinal injection in August.    In February of this year she had a normal hemogram.    She did have COVID 2 months ago.  No residual symptoms or cough.    She is never smoked.  No swallowing difficulty.  No cough.  No dental issues no sinusitis or ear pain.  No headache.        PMHx:    Past Medical History:   Diagnosis Date     Abnormal Pap smear of cervix     Multiple, LEEP 2014; wnl 2016     Arthritis     Low Back     Migraines      Motion sickness      PSHx:    Past Surgical History:   Procedure Laterality Date     BIOPSY CERVICAL, LOCAL EXCISION, SINGLE/MULTIPLE  2014    CIN2, Path report showed margins were not clear but pap smear 6 months later was normal     HC REPAIR OF NASAL SEPTUM      Description: Septoplasty;  Recorded: 03/26/2008;     LAPAROSCOPIC SALPINGECTOMY Bilateral 8/4/2021    Procedure: SALPINGECTOMY, LAPAROSCOPIC;  Surgeon: Zahira Kauffman;  Location: [facility] OR     WISDOM TOOTH EXTRACTION  2000     Immunizations:   Immunization History   Administered Date(s) Administered     COVID-19,PF,Moderna 01/17/2021, 02/14/2021     COVID-19,PF,Pfizer (12+ Yrs) 11/03/2021     DT (PEDS <7y) 01/30/2006     FLU 6-35 months 10/20/2010, 11/07/2012     Flu, Unspecified 11/06/2007, 09/01/2013, 10/26/2019     HPV Quadrivalent 10/20/2010, 03/10/2011     HepA, Unspecified 03/29/2007, 11/06/2007     HepA-Adult 03/29/2007, 11/06/2007     Influenza (IIV3) PF 11/06/2007, 09/01/2013     Influenza  "Vaccine IM > 6 months Valent IIV4 (Alfuria,Fluzone) 12/14/2018, 11/07/2020     Influenza,INJ,MDCK,PF,Quad >6mo(Flucelvax-RMG) 11/04/2021     TD (ADULT, 7+) 01/30/2006     Tdap (Adacel,Boostrix) 11/07/2012     Tdap (Adult) Unspecified Formulation 01/30/2006       ROS A comprehensive review of systems was performed and was otherwise negative    Medications, allergies, and problem list were reviewed and updated    Exam  /72 (BP Location: Right arm, Patient Position: Sitting, Cuff Size: Adult Regular)   Pulse 64   Ht 1.626 m (5' 4\")   Wt 75.3 kg (166 lb)   SpO2 98%   BMI 28.49 kg/m    Healthy-appearing female.  I did not palpate any abnormal nodularity of the neck.  There is a slight smooth nontender density in the left anterior neck, but it does appear to be the thyroid cartilage.  There is slight asymmetry to this, possibly an underlying lymph node, but not discrete.  I did not feel any thyroid nodule.  Remainder of the neck was normal.  A few shotty lymph nodes less than 0.5 cm, nontender and mobile.  Lungs are clear.  Heart is regular without murmur.  Oral exam was normal, teeth in good condition and no pharyngitis.    Assessment/Plan  1. Nodule of neck  I suspect some shotty lymph nodes, possibly some changes since having COVID 2 months ago.  No source of inflammation identified in the mouth at this time.    She did want to go ahead with an ultrasound of her neck to rule out an abnormal lymph node.    She did request further lab work as well.    Follow-up with her primary care clinic if any further changes, otherwise due for routine follow-up in January  -  Head Neck Soft Tissue; Future  - CBC with Platelets & Differential    2. Fatigue, unspecified type  Chronic issue.  - Basic metabolic panel  - TSH      Return in about 4 months (around 1/14/2023) for Follow up with your regular physician.   Patient Instructions   Your neck exam today was essentially normal.  There was a questionable density on the " left side of your neck, but I suspect that is normal thyroid cartilage.  You will undergo an ultrasound of your neck, to rule out an enlarged lymph node.    Look on your [facility] portal for results of today's tests.    Follow-up with your primary care physician in January as planned.  Follow-up sooner with your primary care physician if you feel there is further changes or new neck nodules appear.        Uriel Alfred MD, MD        Current Outpatient Medications   Medication Sig Dispense Refill     acetaminophen (TYLENOL) 325 MG tablet Take 3 tablets (975 mg) by mouth every 6 hours as needed for mild pain 50 tablet 0     buPROPion (WELLBUTRIN XL) 300 MG 24 hr tablet Take 1 tablet (300 mg) by mouth every morning 90 tablet 3     cholecalciferol 50 MCG (2000 UT) tablet Take 2,000 Units by mouth       ferrous sulfate (FEROSUL) 325 (65 Fe) MG tablet Take 1 tablet by mouth every other day        fish oil-omega-3 fatty acids 1000 MG capsule Take 1 g by mouth daily       ibuprofen (ADVIL/MOTRIN) 800 MG tablet Take 1 tablet (800 mg) by mouth every 6 hours as needed for other (mild and/or inflammatory pain) 30 tablet 0     meloxicam (MOBIC) 7.5 MG tablet Take 2 tablets (15 mg) by mouth daily 30 tablet 1     Multiple Vitamins-Minerals (MULTIVITAMIN ADULTS PO) Take 1 tablet by mouth daily        polyethylene glycol (MIRALAX) 17 GM/Dose powder Take 1 capful by mouth daily as needed for constipation       promethazine (PHENERGAN) 25 MG tablet Take 0.5-1 tablets (12.5-25 mg) by mouth every 6 hours as needed for nausea 20 tablet 3     rizatriptan (MAXALT-MLT) 5 MG ODT Take 1-2 tablets (5-10 mg) by mouth at onset of headache for migraine (may repeat in 2 hours as needed. Max 30 mg in 24 hours) 18 tablet 6     topiramate (TOPAMAX) 100 MG tablet Take 1 tablet (100 mg) by mouth At Bedtime 90 tablet 1     Allergies   Allergen Reactions     Scopolamine Visual Disturbance and Other (See Comments)     dizziness     Social History      Tobacco Use     Smoking status: Never Smoker     Smokeless tobacco: Never Used   Vaping Use     Vaping Use: Never used   Substance Use Topics     Alcohol use: Yes     Comment: Rarely     Drug use: Never             Subjective   Leny is a 42 year old, presenting for the following health issues:  lumps on neck (For a couple of months, not painful)      History of Present Illness       Reason for visit:  Lump on neck    She eats 2-3 servings of fruits and vegetables daily.She consumes 1 sweetened beverage(s) daily.She exercises with enough effort to increase her heart rate 20 to 29 minutes per day.  She exercises with enough effort to increase her heart rate 7 days per week.   She is taking medications regularly.    Today's PHQ-9         PHQ-9 Total Score: 7    PHQ-9 Q9 Thoughts of better off dead/self-harm past 2 weeks :   Not at all    How difficult have these problems made it for you to do your work, take care of things at home, or get along with other people: Somewhat difficult             Review of Systems         Objective    There were no vitals taken for this visit.  There is no height or weight on file to calculate BMI.  Physical Exam                        Face Mask

## 2022-09-18 ENCOUNTER — HEALTH MAINTENANCE LETTER (OUTPATIENT)
Age: 42
End: 2022-09-18

## 2022-09-29 ENCOUNTER — OFFICE VISIT (OUTPATIENT)
Dept: PHYSICAL MEDICINE AND REHAB | Facility: CLINIC | Age: 42
End: 2022-09-29
Payer: COMMERCIAL

## 2022-09-29 VITALS — OXYGEN SATURATION: 99 % | DIASTOLIC BLOOD PRESSURE: 90 MMHG | SYSTOLIC BLOOD PRESSURE: 125 MMHG | HEART RATE: 93 BPM

## 2022-09-29 DIAGNOSIS — M99.04 SACRAL REGION SOMATIC DYSFUNCTION: ICD-10-CM

## 2022-09-29 DIAGNOSIS — M99.03 LUMBAR REGION SOMATIC DYSFUNCTION: Primary | ICD-10-CM

## 2022-09-29 DIAGNOSIS — M99.01 CERVICAL SOMATIC DYSFUNCTION: ICD-10-CM

## 2022-09-29 DIAGNOSIS — M99.05 SOMATIC DYSFUNCTION OF PELVIS REGION: ICD-10-CM

## 2022-09-29 DIAGNOSIS — M99.08 RIB CAGE REGION SOMATIC DYSFUNCTION: ICD-10-CM

## 2022-09-29 DIAGNOSIS — M99.02 THORACIC REGION SOMATIC DYSFUNCTION: ICD-10-CM

## 2022-09-29 DIAGNOSIS — M99.00 HEAD REGION SOMATIC DYSFUNCTION: ICD-10-CM

## 2022-09-29 PROCEDURE — 98928 OSTEOPATH MANJ 7-8 REGIONS: CPT | Performed by: PHYSICAL MEDICINE & REHABILITATION

## 2022-09-29 NOTE — PATIENT INSTRUCTIONS
Leny,    I will have my manager reach out to you about the mix up this morning.  I'm sorry that you didn't get checked in right away.      You were treated with Osteopathic Manipulative Medicine (OMM) today.    Please rest today and drink plenty of water.   It is okay to do light activities but please do not do any intensive exercises/activities.    It is normal to have soreness following the treatment.  Please use ice over the sore areas if you want to do something to help the soreness (ice is typically better than heat).  You may take your usual pain medications.  Please call the clinic at [phone] or contact me (Dr. Humphreys) via [employer] if the soreness is concerning to you.      Please follow-up with me (Dr. Humphreys) in 2-4 weeks.

## 2022-09-29 NOTE — PROGRESS NOTES
Assessment:   Leny Jeffrey is a 42 year old y.o. female with past medical history significant for depression, migraine headaches, fibromyalgia who presents today for follow-up regarding chronic severe bilateral low back pain (left greater than right) of unknown etiology but potentially thought to be due to lumbar facet joint syndrome secondary to lumbar spondylosis and a pseudoarticulation versus sacroiliac joint pain.  She has failed to have relief with all injections.  She has wanted to try osteopathic manipulation.  She does have multiple areas of osteopathic somatic dysfunction as listed below.  She remains neurologically intact and without any red flag symptoms..      PSP:  Dr. Humphreys        Plan:     A shared decision making plan was used.  The patient's values and choices were respected.  The following represents what was discussed and decided upon by the physician and the patient.      1.  DIAGNOSTIC TESTS:   -Her HLA-B27 test was negative.  I had sent her these results through [employer] and reviewed them with her again today.  She does have an appointment set up with rheumatology on October 12, 2022 which she is encouraged to follow through with.  -She does have transitional anatomy with a lumbarized S1 and a vestigial disc at the S1-2 level.  -The patient's MRI of the lumbar spine from February 29, 2020 was personally reviewed today by the physician with physician performing her own interpretation.  There is a disc bulge at the L3-4 level but this does not cause any nerve root impingement, central canal stenosis, or foraminal stenosis.  There is a disc bulge at the L4-5 level.  There is also facet arthropathy with ligamentum flavum thickening at this level as well.  There is no significant central canal stenosis or foraminal stenosis at the L4-5 level.  There is disc degeneration seen at the L5-S1 level with facet hypertrophy.  This does cause mild lateral recess stenosis and left greater than right  foraminal stenosis.  There is no high-grade foraminal stenosis or central canal stenosis seen.  -Patient did have an MRI of the pelvis from October 9, 2021.  The report was reviewed today and is summarized as follows: No inflammatory sacroiliitis.  There is partial disc degeneration at the L4-5 level with some edema in the left inferior L4 endplate.  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  2.  PHYSICAL THERAPY: No further physical therapy at this time.  The patient has previously completed physical therapy and is encouraged to continue doing the home exercise program daily.    3.  MEDICATIONS:  No further medication recommendations.  -She had previously discontinued meloxicam secondary to ineffectiveness.  -She previously discontinued tizanidine secondary to ineffectiveness.  -She previously discontinued gabapentin as she said it was ineffective.  -She has discontinued sertraline, duloxetine, Savella secondary to side effects.  4.  INTERVENTIONS:  The patient is considered appropriate for continued osteopathic manipulation.  Osteopathic manipulation was performed to 7 areas today.  The patient tolerated the treatment well and did not report any soreness immediately afterwards..   Please see below for the osteopathic manipulation that was performed today.  -We could consider bilateral L5-S1 transforaminal epidural steroid injections.  This was recommended by the surgeon as potential etiology of her pain given the disc degeneration seen at this level  -She is status post a left L5-S1 pseudoarticulation injection (left L5 transverse process articulating with the sacrum) on August 8, 2022.  At this time, she states that she did not get any relief, not even temporarily.  -She had previously failed to have relief with left L3, 4, L5 medial branch blocks on July 19, 2022  -She had failed to have relief with  bilateral SI joint injections on April 12, 2022.  5.  PATIENT EDUCATION:    - We had discussed having her see a rheumatologist regarding potential dermatomyositis.  She is scheduled on October 12, 2022 through [facility].  -I had discussed with the surgeon and he does not recommend removing the pseudoarticulation unless it is an absolute last resort option.  He did think that potentially the lumbar disc degeneration could be causing her pain.  - The patient was advised to rest today and drink plenty of water.  Normal activities can be resumed as tolerated tomorrow.    - The patient was told that soreness following the treatment is normal and should improve within a few days.  Ice should be used (as opposed to heat) if soreness occurs.  If the soreness is concerning, the clinic should be notified so further instructions can be given.  6.  FOLLOW-UP: The patient is asked to follow-up in 2 to 4 weeks for reevaluation.  She is asked to monitor her symptoms closely in between now and the next appointment to see if she notices any improvement in her pain or improvement in functioning.. If there are any questions/concerns or any significant worsening of pain prior to that time, the patient is asked to call the clinic via the nurse navigation line or via [facility]ul.     Subjective:     Leny Jeffrey is a 42 year old female who presents today for follow-up regarding chronic left greater than right bilateral low back pain without radicular/sciatic type leg symptoms.  She is here specifically to try osteopathic manipulation, she has failed to have relief with previous injections.  She continues to have midline and left low back pain with pain rating into the left groin area.  She denies any pain radiating down the leg.  She does feel like the back and hip are somewhat weak.  She rates her pain today at an 8 out of 10.  At worst is a 9 out of 10.  At best is an 8 out of 10.  Her pain is worse with laying down, bending  forward, standing.  She does have periods where if she has been sedentary or not moving or not changing positions, the pain can get worse.  She does feel better in general with changing positions on a regular basis.  She denies any new symptoms.  She has been doing her physical therapy exercises on a regular basis.  She is taking ibuprofen and meloxicam in the past, but she is not taking them now she is never found any medications to be helpful.    She does report that she has had back pain ever since she was in high school but worsened in the last 2 to 3 years.  She reports that she is always had upper back pain as well but without any radiation of pain into the arms or into the rib cage.    Past medical history is reviewed and is unchanged in the interim.    Family history is reviewed and is unchanged in the interim.    Review of Systems: For weakness in the back of the head, headaches, pain that is worse at night.  Pertinent negatives include no numbness, tingling fevers, chills, unexplained weight loss, bowel incontinence, bladder incontinence, trips, stumbles, falls.  All others reviewed and are negative.     Objective:   CONSTITUTIONAL:  Vital signs as above.  No acute distress.  The patient is well nourished and well groomed.    PSYCHIATRIC:  The patient is awake, alert, oriented to person, place and time.  The patient is answering questions appropriately with clear speech.  Normal affect.  SKIN:  Skin over the face, posterior torso, bilateral upper and lower extremities is clean, dry, intact without rashes.  MUSCULOSKELETAL:  Gait is non-antalgic.  The patient is able to transfer independently.      OSTEOPATHIC STRUCTURAL EXAM:  Positive standing flexion test on the left.  Left superior iliac crest  Lumbar spine: L4-5 flexed, rotated/side bent left, tight left hip flexor.  Sacrum: Left on left forward sacral torsion  Innominates/Pelvis: Left up slipped innominate, posterior/superior left  Thoracic spine: T1  flexed, rotated/side bent right  Rib cage: First rib significantly elevated on the left  Cervical spine: C7 flexed, rotated/side bent right  Head/OA joint: Side bent right/rotated left    OMT:  TREATMENTS IN PARENTHESES   Lumbar spine: L4-5 flexed, rotated/side bent left.  (Muscle energy, patient in lateral recumbent), tight left hip flexor (Myofascial release, patient prone).  Sacrum: Left on left forward sacral torsion  (Muscle energy, patient in lateral recumbent)  Innominates/Pelvis: Left up slipped innominate (Still technique, patient prone)., posterior/superior left  (Muscle energy with the patient supine).  Thoracic spine: T1 flexed, rotated/side bent right (Myofascial release, patient supine)  Rib cage: First rib significantly elevated on the left (Myofascial release, patient supine)  Cervical spine: C7 flexed, rotated/side bent right  (Muscle energy with the patient supine).  Head/OA joint: Side bent right/rotated left  (Muscle energy with the patient supine).

## 2022-10-11 ENCOUNTER — TRANSFERRED RECORDS (OUTPATIENT)
Dept: HEALTH INFORMATION MANAGEMENT | Facility: CLINIC | Age: 42
End: 2022-10-11

## 2022-10-24 NOTE — PROGRESS NOTES
Assessment:   Leny eJffrey is a 42 year old y.o. female with past medical history significant for depression, migraine headaches, fibromyalgia who presents today for follow-up regarding chronic severe bilateral low back pain (left greater than right) of unknown etiology but potentially thought to be due to lumbar facet joint syndrome secondary to lumbar spondylosis and a pseudoarticulation versus sacroiliac joint pain.  She has failed to have relief with all injections.  She is status post her first osteopathic manipulation treatment on September 29, 2022.  She does not feel that she had any relief whatsoever with that treatment.  She remains neurologically intact and without any red flag symptoms.    She has been evaluated by rheumatology and has been given a diagnosis of psoriatic arthritis.  She has been started on sulfasalazine medication about 2 weeks ago.    PSP:  Dr. Humphreys        Plan:     A shared decision making plan was used.  The patient's values and choices were respected.  The following represents what was discussed and decided upon by the physician and the patient.      1.  DIAGNOSTIC TESTS/RECORDS REVIEW:   -No further diagnostic testing necessary from a spine perspective.  I will defer to rheumatology for any further work-up necessary for her rheumatologic diagnoses.  -She does have transitional anatomy with a lumbarized S1 and a vestigial disc at the S1-2 level.  -The patient's MRI of the lumbar spine from February 29, 2020 was personally reviewed today by the physician with physician performing her own interpretation.  There is a disc bulge at the L3-4 level but this does not cause any nerve root impingement, central canal stenosis, or foraminal stenosis.  There is a disc bulge at the L4-5 level.  There is also facet arthropathy with ligamentum flavum thickening at this level as well.  There is no significant central canal stenosis or foraminal stenosis at the L4-5 level.  There is disc  degeneration seen at the L5-S1 level with facet hypertrophy.  This does cause mild lateral recess stenosis and left greater than right foraminal stenosis.  There is no high-grade foraminal stenosis or central canal stenosis seen.  -Patient did have an MRI of the pelvis from October 9, 2021.  The report was reviewed today and is summarized as follows: No inflammatory sacroiliitis.  There is partial disc degeneration at the L4-5 level with some edema in the left inferior L4 endplate.  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  -Patient's rheumatology note from October 11, 2022 was reviewed by physician and is summarized as follows: Patient with chronic low back pain and positive MICHELLE testing.  Impression is likely new diagnosis of psoriatic arthritis.  The plan is for sulfasalazine 1 tablet 1 time a week and then 2 tablets weekly.  Plan will be to return to clinic in 3 months for office visit and labs  2.  PHYSICAL THERAPY: No further physical therapy at this time.   The patient has previously completed physical therapy and is encouraged to continue doing the home exercise program daily.    3.  MEDICATIONS:  No further medication recommendations.    -She has started sulfasalazine as recommended by rheumatology.  -She had previously discontinued meloxicam secondary to ineffectiveness.  -She previously discontinued tizanidine secondary to ineffectiveness.  -She previously discontinued gabapentin as she said it was ineffective.  -She has discontinued sertraline, duloxetine, Savella secondary to side effects.  4.  INTERVENTIONS:    -She has not felt that the osteopathic manipulation has been beneficial so we decided not to continue to perform this treatment.  -The surgeon had said that bilateral L5-S1 transforaminal epidural steroid injections could be considered an option.  However I think that these are not likely  to provide significant relief.  We could consider these in the future if she would like to try a different type of injection.  -She is status post a left L5-S1 pseudoarticulation injection (left L5 transverse process articulating with the sacrum) on August 8, 2022.  At this time, she states that she did not get any relief, not even temporarily.  -She had previously failed to have relief with left L3, 4, L5 medial branch blocks on July 19, 2022  -She had failed to have relief with bilateral SI joint injections on April 12, 2022.  5.  PATIENT EDUCATION:    -I had discussed with the surgeon previously and he does not recommend removing the pseudoarticulation unless it is an absolute last resort option.  He did think that potentially the lumbar disc degeneration could be causing her pain.  -Encouraged her to remain optimistic that the rheumatologic treatments will help.  6.  FOLLOW-UP: The patient is asked to follow-up as needed in this clinic, but she should continue to follow with rheumatology.  She is welcome to contact me with any questions or concerns or if she believes that there is anything that I can try to help her with..     Subjective:     Leny Jeffrey is a 42 year old female who presents today for follow-up regarding chronic left greater than right bilateral low back pain without radicular/sciatic type leg symptoms.  At her last visit on September 29, 2022, she was treated with osteopathic manipulation.  She does not think that the osteopathic manipulation has helped at all with the pain.  Since that time she has also seen rheumatology on October 11, 2022.  They did give her diagnosis of psoriatic arthritis and she has been on sulfasalazine for the last couple of weeks.  She says that the rheumatologist told her that it would likely take 2-1/2 to 3 months before knowing if the sulfasalazine is helping or not.  She does seem to be tolerating the sulfasalazine well without significant side effects.  She  reports that she has had some slight increase in her headaches, but she has a chronic history of headaches so she is unsure if the increase in headaches is related to the sulfasalazine or just a manifestation of her chronic headaches.  Overall she continues to note pain in her low back, and upper back and in the joints, particularly at the bilateral elbows and the left hip.  She rates her pain today at an 8 out of 10.  At worst it is a 9 out of 10.  At best is an 8 out of 10.  Her pain is worse with laying down, standing, bending, lifting, laying in 1 position and not moving for more than a few minutes.  She does feel better with stretching and movement.  She denies any new symptoms at this time.  She is doing physical therapy exercises on her own.  She is not taking any medications for pain, she has tried several in the past have not been helpful    Past medical history is reviewed and is unchanged in the interim.    Family history is reviewed and is unchanged in the interim.    Review of Systems: Positive for sensation of weakness, headaches, pain that makes it difficult to sleep at night.  Pertinent negatives include no numbness, tingling, numbness, tingling fevers, chills, unexplained weight loss, bowel incontinence, bladder incontinence, trips, stumbles, falls.  All others reviewed and are negative.     Objective:   CONSTITUTIONAL:  Vital signs as above.  No acute distress.  The patient is well nourished and well groomed.    PSYCHIATRIC:  The patient is awake, alert, oriented to person, place and time.  The patient is answering questions appropriately with clear speech.  Normal affect.  SKIN:  Skin over the face, posterior torso, bilateral upper and lower extremities is clean, dry, intact without rashes.  MUSCULOSKELETAL:  Gait is non-antalgic.  The patient is able to transfer independently.  She can walk on her heels and her toes without any difficulty.  She does have significant tenderness over the left  lumbar paraspinal muscles and over the left SI joint.  No significant tenderness over the right lumbar paraspinal muscles or right SI joint.  She has 5/5 strength with bilateral hip flexors, knee flexors/extensors comings dorsiflexor/plantar flexors, ankle evertors/invertors.  She has largely normal range of motion of the bilateral hips without significant pain.  NEURO: 2/4 patellar, medial hamstring, Achilles reflexes bilaterally.  Sensation light touch is intact in the bilateral L4, 5, and S1 dermatomes.  No ankle clonus bilaterally.

## 2022-10-25 ENCOUNTER — OFFICE VISIT (OUTPATIENT)
Dept: PHYSICAL MEDICINE AND REHAB | Facility: CLINIC | Age: 42
End: 2022-10-25
Payer: COMMERCIAL

## 2022-10-25 VITALS — TEMPERATURE: 98.3 F | SYSTOLIC BLOOD PRESSURE: 101 MMHG | HEART RATE: 75 BPM | DIASTOLIC BLOOD PRESSURE: 62 MMHG

## 2022-10-25 DIAGNOSIS — M54.50 CHRONIC BILATERAL LOW BACK PAIN WITHOUT SCIATICA: Primary | ICD-10-CM

## 2022-10-25 DIAGNOSIS — G89.29 CHRONIC BILATERAL LOW BACK PAIN WITHOUT SCIATICA: Primary | ICD-10-CM

## 2022-10-25 DIAGNOSIS — L40.50 PSORIATIC ARTHRITIS (H): ICD-10-CM

## 2022-10-25 PROCEDURE — 99214 OFFICE O/P EST MOD 30 MIN: CPT | Performed by: PHYSICAL MEDICINE & REHABILITATION

## 2022-10-25 RX ORDER — SULFASALAZINE 500 MG/1
TABLET ORAL
COMMUNITY
Start: 2022-10-11 | End: 2023-01-15

## 2022-10-25 ASSESSMENT — PAIN SCALES - GENERAL: PAINLEVEL: EXTREME PAIN (8)

## 2022-10-25 NOTE — LETTER
10/25/2022         RE: Leny Jeffrey  [address] [facility] [address]        Dear Colleague,    Thank you for referring your patient, Leny Jeffrey, to the [facility]. Please see a copy of my visit note below.    Assessment:   Leny Jeffrey is a 42 year old y.o. female with past medical history significant for depression, migraine headaches, fibromyalgia who presents today for follow-up regarding chronic severe bilateral low back pain (left greater than right) of unknown etiology but potentially thought to be due to lumbar facet joint syndrome secondary to lumbar spondylosis and a pseudoarticulation versus sacroiliac joint pain.  She has failed to have relief with all injections.  She is status post her first osteopathic manipulation treatment on September 29, 2022.  She does not feel that she had any relief whatsoever with that treatment.  She remains neurologically intact and without any red flag symptoms.    She has been evaluated by rheumatology and has been given a diagnosis of psoriatic arthritis.  She has been started on sulfasalazine medication about 2 weeks ago.    PSP:  Dr. Humphreys        Plan:     A shared decision making plan was used.  The patient's values and choices were respected.  The following represents what was discussed and decided upon by the physician and the patient.      1.  DIAGNOSTIC TESTS/RECORDS REVIEW:   -No further diagnostic testing necessary from a spine perspective.  I will defer to rheumatology for any further work-up necessary for her rheumatologic diagnoses.  -She does have transitional anatomy with a lumbarized S1 and a vestigial disc at the S1-2 level.  -The patient's MRI of the lumbar spine from February 29, 2020 was personally reviewed today by the physician with physician performing her own interpretation.  There is a disc bulge at the L3-4 level but this does not cause any nerve root impingement, central canal stenosis, or foraminal  stenosis.  There is a disc bulge at the L4-5 level.  There is also facet arthropathy with ligamentum flavum thickening at this level as well.  There is no significant central canal stenosis or foraminal stenosis at the L4-5 level.  There is disc degeneration seen at the L5-S1 level with facet hypertrophy.  This does cause mild lateral recess stenosis and left greater than right foraminal stenosis.  There is no high-grade foraminal stenosis or central canal stenosis seen.  -Patient did have an MRI of the pelvis from October 9, 2021.  The report was reviewed today and is summarized as follows: No inflammatory sacroiliitis.  There is partial disc degeneration at the L4-5 level with some edema in the left inferior L4 endplate.  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  -Patient's rheumatology note from October 11, 2022 was reviewed by physician and is summarized as follows: Patient with chronic low back pain and positive MICHELLE testing.  Impression is likely new diagnosis of psoriatic arthritis.  The plan is for sulfasalazine 1 tablet 1 time a week and then 2 tablets weekly.  Plan will be to return to clinic in 3 months for office visit and labs  2.  PHYSICAL THERAPY: No further physical therapy at this time.   The patient has previously completed physical therapy and is encouraged to continue doing the home exercise program daily.    3.  MEDICATIONS:  No further medication recommendations.    -She has started sulfasalazine as recommended by rheumatology.  -She had previously discontinued meloxicam secondary to ineffectiveness.  -She previously discontinued tizanidine secondary to ineffectiveness.  -She previously discontinued gabapentin as she said it was ineffective.  -She has discontinued sertraline, duloxetine, Savella secondary to side effects.  4.  INTERVENTIONS:    -She has not felt that the osteopathic  manipulation has been beneficial so we decided not to continue to perform this treatment.  -The surgeon had said that bilateral L5-S1 transforaminal epidural steroid injections could be considered an option.  However I think that these are not likely to provide significant relief.  We could consider these in the future if she would like to try a different type of injection.  -She is status post a left L5-S1 pseudoarticulation injection (left L5 transverse process articulating with the sacrum) on August 8, 2022.  At this time, she states that she did not get any relief, not even temporarily.  -She had previously failed to have relief with left L3, 4, L5 medial branch blocks on July 19, 2022  -She had failed to have relief with bilateral SI joint injections on April 12, 2022.  5.  PATIENT EDUCATION:    -I had discussed with the surgeon previously and he does not recommend removing the pseudoarticulation unless it is an absolute last resort option.  He did think that potentially the lumbar disc degeneration could be causing her pain.  -Encouraged her to remain optimistic that the rheumatologic treatments will help.  6.  FOLLOW-UP: The patient is asked to follow-up as needed in this clinic, but she should continue to follow with rheumatology.  She is welcome to contact me with any questions or concerns or if she believes that there is anything that I can try to help her with..     Subjective:     Leny Jeffrey is a 42 year old female who presents today for follow-up regarding chronic left greater than right bilateral low back pain without radicular/sciatic type leg symptoms.  At her last visit on September 29, 2022, she was treated with osteopathic manipulation.  She does not think that the osteopathic manipulation has helped at all with the pain.  Since that time she has also seen rheumatology on October 11, 2022.  They did give her diagnosis of psoriatic arthritis and she has been on sulfasalazine for the last couple  of weeks.  She says that the rheumatologist told her that it would likely take 2-1/2 to 3 months before knowing if the sulfasalazine is helping or not.  She does seem to be tolerating the sulfasalazine well without significant side effects.  She reports that she has had some slight increase in her headaches, but she has a chronic history of headaches so she is unsure if the increase in headaches is related to the sulfasalazine or just a manifestation of her chronic headaches.  Overall she continues to note pain in her low back, and upper back and in the joints, particularly at the bilateral elbows and the left hip.  She rates her pain today at an 8 out of 10.  At worst it is a 9 out of 10.  At best is an 8 out of 10.  Her pain is worse with laying down, standing, bending, lifting, laying in 1 position and not moving for more than a few minutes.  She does feel better with stretching and movement.  She denies any new symptoms at this time.  She is doing physical therapy exercises on her own.  She is not taking any medications for pain, she has tried several in the past have not been helpful    Past medical history is reviewed and is unchanged in the interim.    Family history is reviewed and is unchanged in the interim.    Review of Systems: Positive for sensation of weakness, headaches, pain that makes it difficult to sleep at night.  Pertinent negatives include no numbness, tingling, numbness, tingling fevers, chills, unexplained weight loss, bowel incontinence, bladder incontinence, trips, stumbles, falls.  All others reviewed and are negative.     Objective:   CONSTITUTIONAL:  Vital signs as above.  No acute distress.  The patient is well nourished and well groomed.    PSYCHIATRIC:  The patient is awake, alert, oriented to person, place and time.  The patient is answering questions appropriately with clear speech.  Normal affect.  SKIN:  Skin over the face, posterior torso, bilateral upper and lower extremities is  clean, dry, intact without rashes.  MUSCULOSKELETAL:  Gait is non-antalgic.  The patient is able to transfer independently.  She can walk on her heels and her toes without any difficulty.  She does have significant tenderness over the left lumbar paraspinal muscles and over the left SI joint.  No significant tenderness over the right lumbar paraspinal muscles or right SI joint.  She has 5/5 strength with bilateral hip flexors, knee flexors/extensors comings dorsiflexor/plantar flexors, ankle evertors/invertors.  She has largely normal range of motion of the bilateral hips without significant pain.  NEURO: 2/4 patellar, medial hamstring, Achilles reflexes bilaterally.  Sensation light touch is intact in the bilateral L4, 5, and S1 dermatomes.  No ankle clonus bilaterally.                    Again, thank you for allowing me to participate in the care of your patient.        Sincerely,        Florencia Nichols, DO

## 2022-10-25 NOTE — PATIENT INSTRUCTIONS
Leny    I am glad that rheumatology was able to see you so quickly and that they have given you a diagnosis.  I am optimistic that either the sulfasalazine or some other treatment will be helpful with the pain that you are having.    At this point we will have you continue to follow-up with rheumatology.  I am here for you at any point if you have any other questions or concerns.  Please do not hesitate to reach out to me through [employer] or call the nurse line at [phone] if there is anything else that I can help you with.   I will be leaving the organization towards the end of the year, but any of the other providers here at the spine center would provide excellent care for you.    Thank you!  Dr. Humphreys

## 2022-12-24 NOTE — TELEPHONE ENCOUNTER
The patient failed to have relief with her left L3, L4, L5 medial branch blocks.  Her pain is potentially coming from the pseudoarticulation between the left L5 transverse process and the sacrum.  I have ordered an L5-S1 facet joint injection, with the understanding that this will target the pseudoarticulation as opposed to the facet joint.  A radiofrequency ablation is contraindicated as it is not possible for pseudoarticulation.  A message has been sent to the PA team to request authorization.    The procedure team will contact the patient to explain the neck steps given that she did not have relief with the left L3, L4, L5 medial branch blocks and to offer her the pseudoarticulation injection.  
10-Oct-2022

## 2022-12-30 ENCOUNTER — LAB (OUTPATIENT)
Dept: LAB | Facility: CLINIC | Age: 42
End: 2022-12-30
Payer: COMMERCIAL

## 2022-12-30 DIAGNOSIS — L40.50 PSORIATIC ARTHRITIS (H): Primary | ICD-10-CM

## 2022-12-30 LAB
ALBUMIN SERPL BCG-MCNC: 4.3 G/DL (ref 3.5–5.2)
ALT SERPL W P-5'-P-CCNC: 23 U/L (ref 10–35)
AST SERPL W P-5'-P-CCNC: 22 U/L (ref 10–35)
CREAT SERPL-MCNC: 1.13 MG/DL (ref 0.51–0.95)
CRP SERPL-MCNC: 4.59 MG/L
ERYTHROCYTE [DISTWIDTH] IN BLOOD BY AUTOMATED COUNT: 12.6 % (ref 10–15)
ERYTHROCYTE [SEDIMENTATION RATE] IN BLOOD BY WESTERGREN METHOD: 7 MM/HR (ref 0–20)
GFR SERPL CREATININE-BSD FRML MDRD: 62 ML/MIN/1.73M2
HBV SURFACE AB SERPL IA-ACNC: 127.22 M[IU]/ML
HBV SURFACE AB SERPL IA-ACNC: REACTIVE M[IU]/ML
HBV SURFACE AG SERPL QL IA: NONREACTIVE
HCT VFR BLD AUTO: 41.4 % (ref 35–47)
HCV AB SERPL QL IA: NONREACTIVE
HGB BLD-MCNC: 14.5 G/DL (ref 11.7–15.7)
MCH RBC QN AUTO: 31.5 PG (ref 26.5–33)
MCHC RBC AUTO-ENTMCNC: 35 G/DL (ref 31.5–36.5)
MCV RBC AUTO: 90 FL (ref 78–100)
PLATELET # BLD AUTO: 257 10E3/UL (ref 150–450)
RBC # BLD AUTO: 4.6 10E6/UL (ref 3.8–5.2)
WBC # BLD AUTO: 4.1 10E3/UL (ref 4–11)

## 2022-12-30 PROCEDURE — 86706 HEP B SURFACE ANTIBODY: CPT

## 2022-12-30 PROCEDURE — 84460 ALANINE AMINO (ALT) (SGPT): CPT

## 2022-12-30 PROCEDURE — 85652 RBC SED RATE AUTOMATED: CPT

## 2022-12-30 PROCEDURE — 36415 COLL VENOUS BLD VENIPUNCTURE: CPT

## 2022-12-30 PROCEDURE — 84450 TRANSFERASE (AST) (SGOT): CPT

## 2022-12-30 PROCEDURE — 86140 C-REACTIVE PROTEIN: CPT

## 2022-12-30 PROCEDURE — 82565 ASSAY OF CREATININE: CPT

## 2022-12-30 PROCEDURE — 87340 HEPATITIS B SURFACE AG IA: CPT

## 2022-12-30 PROCEDURE — 85027 COMPLETE CBC AUTOMATED: CPT

## 2022-12-30 PROCEDURE — 86803 HEPATITIS C AB TEST: CPT

## 2022-12-30 PROCEDURE — 82040 ASSAY OF SERUM ALBUMIN: CPT

## 2022-12-30 PROCEDURE — 86481 TB AG RESPONSE T-CELL SUSP: CPT

## 2023-01-01 LAB
GAMMA INTERFERON BACKGROUND BLD IA-ACNC: 0.05 IU/ML
M TB IFN-G BLD-IMP: NEGATIVE
M TB IFN-G CD4+ BCKGRND COR BLD-ACNC: 9.95 IU/ML
MITOGEN IGNF BCKGRD COR BLD-ACNC: 0.01 IU/ML
MITOGEN IGNF BCKGRD COR BLD-ACNC: 0.05 IU/ML
QUANTIFERON MITOGEN: 10 IU/ML
QUANTIFERON NIL TUBE: 0.05 IU/ML
QUANTIFERON TB1 TUBE: 0.06 IU/ML
QUANTIFERON TB2 TUBE: 0.1

## 2023-01-06 ASSESSMENT — PATIENT HEALTH QUESTIONNAIRE - PHQ9
10. IF YOU CHECKED OFF ANY PROBLEMS, HOW DIFFICULT HAVE THESE PROBLEMS MADE IT FOR YOU TO DO YOUR WORK, TAKE CARE OF THINGS AT HOME, OR GET ALONG WITH OTHER PEOPLE: SOMEWHAT DIFFICULT
SUM OF ALL RESPONSES TO PHQ QUESTIONS 1-9: 17
SUM OF ALL RESPONSES TO PHQ QUESTIONS 1-9: 17

## 2023-01-06 ASSESSMENT — ENCOUNTER SYMPTOMS
HEMATOCHEZIA: 0
EYE PAIN: 0
BREAST MASS: 0
CONSTIPATION: 0
HEADACHES: 1
HEARTBURN: 0
DYSURIA: 0
ARTHRALGIAS: 1
PALPITATIONS: 0
PARESTHESIAS: 0
DIZZINESS: 0
NERVOUS/ANXIOUS: 0
SORE THROAT: 0
NAUSEA: 0
CHILLS: 0
COUGH: 0
SHORTNESS OF BREATH: 0
FREQUENCY: 0
ABDOMINAL PAIN: 0
DIARRHEA: 0
HEMATURIA: 0
FEVER: 0
JOINT SWELLING: 0
WEAKNESS: 1
MYALGIAS: 1

## 2023-01-13 ENCOUNTER — OFFICE VISIT (OUTPATIENT)
Dept: FAMILY MEDICINE | Facility: CLINIC | Age: 43
End: 2023-01-13
Payer: COMMERCIAL

## 2023-01-13 VITALS
WEIGHT: 170 LBS | DIASTOLIC BLOOD PRESSURE: 62 MMHG | OXYGEN SATURATION: 100 % | HEIGHT: 64 IN | TEMPERATURE: 98.1 F | SYSTOLIC BLOOD PRESSURE: 105 MMHG | RESPIRATION RATE: 14 BRPM | BODY MASS INDEX: 29.02 KG/M2 | HEART RATE: 78 BPM

## 2023-01-13 DIAGNOSIS — F33.41 RECURRENT MAJOR DEPRESSIVE DISORDER, IN PARTIAL REMISSION (H): ICD-10-CM

## 2023-01-13 DIAGNOSIS — Z00.00 ROUTINE GENERAL MEDICAL EXAMINATION AT A HEALTH CARE FACILITY: Primary | ICD-10-CM

## 2023-01-13 PROCEDURE — 99396 PREV VISIT EST AGE 40-64: CPT | Mod: 25 | Performed by: FAMILY MEDICINE

## 2023-01-13 PROCEDURE — 90714 TD VACC NO PRESV 7 YRS+ IM: CPT | Performed by: FAMILY MEDICINE

## 2023-01-13 PROCEDURE — 90472 IMMUNIZATION ADMIN EACH ADD: CPT | Performed by: FAMILY MEDICINE

## 2023-01-13 PROCEDURE — 90471 IMMUNIZATION ADMIN: CPT | Performed by: FAMILY MEDICINE

## 2023-01-13 PROCEDURE — 90750 HZV VACC RECOMBINANT IM: CPT | Performed by: FAMILY MEDICINE

## 2023-01-13 PROCEDURE — 99213 OFFICE O/P EST LOW 20 MIN: CPT | Mod: 25 | Performed by: FAMILY MEDICINE

## 2023-01-13 PROCEDURE — 90677 PCV20 VACCINE IM: CPT | Performed by: FAMILY MEDICINE

## 2023-01-13 ASSESSMENT — ENCOUNTER SYMPTOMS
HEMATOCHEZIA: 0
NERVOUS/ANXIOUS: 0
NAUSEA: 0
WEAKNESS: 1
JOINT SWELLING: 0
SORE THROAT: 0
HEMATURIA: 0
FEVER: 0
CHILLS: 0
BREAST MASS: 0
PALPITATIONS: 0
ARTHRALGIAS: 1
HEADACHES: 1
EYE PAIN: 0
FREQUENCY: 0
DIARRHEA: 0
SHORTNESS OF BREATH: 0
ABDOMINAL PAIN: 0
PARESTHESIAS: 0
DIZZINESS: 0
CONSTIPATION: 0
MYALGIAS: 1
COUGH: 0
DYSURIA: 0
HEARTBURN: 0

## 2023-01-13 NOTE — PROGRESS NOTES
"  ASSESSMENT/PLAN:   Leny was seen today for recheck medication and physical.    Diagnoses and all orders for this visit:    Routine general medical examination at a health care facility    Recurrent major depressive disorder, in partial remission (H)  This is influenced by her chronic pain, for her chronic back pain, for which there seems to be not clear diagnosis, and which has failed treatment PHYSICAL THERAPY (multiple times), spine injections, gabapentin, amitriptyline, Savella. She has also used Wellbutrin, and duloxetine for depression - these not effective either. I had mentally arrived at the idea of her trying Prozac - which would help her with motivation - when she told me she wanted to try Prozac  -     Fluoxetine (PROZAC) 20 MG capsule; Take 1 capsule (20 mg) by mouth daily    Other orders  -     Pneumococcal 20 Valent Conjugate (Prevnar 20)  -     REVIEW OF HEALTH MAINTENANCE PROTOCOL ORDERS  -     TD PRESERV FREE, IM (7+ YRS) (DECAVAC/TENIVAC)  -     ZOSTER VACCINE RECOMBINANT ADJUVANTED IM NJX; Standing  -     ZOSTER VACCINE RECOMBINANT ADJUVANTED IM NJX              COUNSELING:  Reviewed preventive health counseling, as reflected in patient instructions      BMI:   Estimated body mass index is 29.18 kg/m  as calculated from the following:    Height as of this encounter: 1.626 m (5' 4\").    Weight as of this encounter: 77.1 kg (170 lb).         She reports that she has never smoked. She has never used smokeless tobacco.    Megan Liu MD  [facility]  Answers for HPI/ROS submitted by the patient on 1/6/2023  If you checked off any problems, how difficult have these problems made it for you to do your work, take care of things at home, or get along with other people?: Somewhat difficult  PHQ9 TOTAL SCORE: 17       SUBJECTIVE:   CC: Leny is an 42 year old who presents for preventive health visit.   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 Humphreys - 4 steroid injections - getting worse  Seeing rheumatologist at [facility] - Dr. Saenz - wants to start her on Humira - [place] approved, may be expensive. Dr. Saenz  mentioned getting shingles and pneumonia vaccines before starting Humira    Though diagnostic testing has not supported the diagnosis, Leny continues to be  worried  That she has  ankylosing spondylitis - can hardly move overnight and in morning.  It hurts 100% of the time.  \"My whole spine hurts, mainly low back.\"  [facility] - has appointment there on January 27th for an evaluation    Regarding medications   - duloxetine didn't work   - gabapentin didn't work   - meloxicam didn't work   - Savella didn't work   - did try amitriptyline     Depression   - want's to stop the bupropion  - he does not have sexual side effects - \"I have been taking it forever and it hasn't done anything.\"   - also tried Zoloft and duloxetine - didn't help mood either    Support?   - weekend away   -has great friends    -  Has support from family    Daughter did janell harper - they think she might have OCD    PHQ 11/30/2021 9/14/2022 1/6/2023   PHQ-9 Total Score 3 7 17   Q9: Thoughts of better off dead/self-harm past 2 weeks Not at all Not at all Several days   F/U: Thoughts of suicide or self-harm - - No   F/U: Safety concerns - - No     PATIENT HEALTH QUESTIONNAIRE-9 (PHQ - 9)    Over " the last 2 weeks, how often have you been bothered by any of the following problems?    1. Little interest or pleasure in doing things -  Nearly every day   2. Feeling down, depressed, or hopeless -  Nearly every day   3. Trouble falling or staying asleep, or sleeping too much - Several days   4. Feeling tired or having little energy -  Nearly every day   5. Poor appetite or overeating -  Several days   6. Feeling bad about yourself - or that you are a failure or have let yourself or your family down -  More than half the days   7. Trouble concentrating on things, such as reading the newspaper or watching television - More than half the days   8. Moving or speaking so slowly that other people could have noticed? Or the opposite - being so fidgety or restless that you have been moving around a lot more than usual Several days   9. Thoughts that you would be better off dead or of hurting  yourself in some way Several days   Total Score: 17     If you checked off any problems, how difficult have these problems made it for you to do your work, take care of things at home, or get along with other people?      She is clear that she DOES NOT HAVE INTENTION TO HARM OR KILL HERSELF     Developed by Kina Sal, Kristina Gutiérrez, Obi Duque and colleagues, with an educational julia from [employer] No permission required to reproduce, translate, display or distribute. permission required to reproduce, translate, display or distribute.      Wt Readings from Last 5 Encounters:   01/13/23 77.1 kg (170 lb)   09/14/22 75.3 kg (166 lb)   03/01/22 69.9 kg (154 lb)   01/11/22 71 kg (156 lb 9.6 oz)   11/30/21 70.2 kg (154 lb 12.8 oz)           Social History     Tobacco Use     Smoking status: Never     Smokeless tobacco: Never   Substance Use Topics     Alcohol use: Yes     Comment: Rarely         Alcohol Use 1/6/2023   Prescreen: >3 drinks/day or >7 drinks/week? No     Reviewed orders with patient.  Reviewed health  maintenance and updated orders accordingly - Yes      Breast Cancer Screening:    FHS-7:   Breast CA Risk Assessment (FHS-7) 1/11/2022 6/6/2022 1/6/2023   Did any of your first-degree relatives have breast or ovarian cancer? No No No   Did any of your relatives have bilateral breast cancer? No No No   Did any man in your family have breast cancer? No No No   Did any woman in your family have breast and ovarian cancer? No No No   Did any woman in your family have breast cancer before age 50 y? No No No   Do you have 2 or more relatives with breast and/or ovarian cancer? No No No   Do you have 2 or more relatives with breast and/or bowel cancer? Yes Yes Yes       Pertinent mammograms are reviewed under the imaging tab.  Participated in Free Genetic Testing Study at [facility]    History of abnormal Pap smear: NO - age 30-65 PAP every 5 years with negative HPV co-testing recommended  PAP / HPV Latest Ref Rng & Units 8/22/2019 7/15/2016 5/18/2015   PAP Negative for squamous intraepithelial lesion or malignancy. Negative for squamous intraepithelial lesion or malignancy  Electronically signed by Patricia Benitez CT (ASCP) on 8/28/2019 at  1:59 PM   Negative for squamous intraepithelial lesion or malignancy  Electronically signed by Malena Londono CT (ASCP) on 7/26/2016 at 10:17 AM   Negative for squamous intraepithelial lesion or malignancy  Electronically signed by Malena Londono CT (ASCP) on 5/22/2015 at  1:28 PM     HPV16 NEG Negative - -   HPV18 NEG Negative - -   HRHPV NEG Negative - -     Reviewed and updated as needed this visit by clinical staff   Tobacco  Allergies  Meds              Reviewed and updated as needed this visit by Provider                     Review of Systems   Constitutional: Negative for chills and fever.   HENT: Negative for congestion, ear pain, hearing loss and sore throat.    Eyes: Negative for pain and visual disturbance.   Respiratory: Negative for cough and  "shortness of breath.    Cardiovascular: Negative for chest pain, palpitations and peripheral edema.   Gastrointestinal: Negative for abdominal pain, constipation, diarrhea, heartburn, hematochezia and nausea.   Breasts:  Negative for tenderness, breast mass and discharge.   Genitourinary: Negative for dysuria, frequency, genital sores, hematuria, pelvic pain, urgency, vaginal bleeding and vaginal discharge.   Musculoskeletal: Positive for arthralgias and myalgias. Negative for joint swelling.   Skin: Positive for rash.   Neurological: Positive for weakness and headaches. Negative for dizziness and paresthesias.   Psychiatric/Behavioral: Positive for mood changes. The patient is not nervous/anxious.           OBJECTIVE:   /62   Pulse 78   Temp 98.1  F (36.7  C) (Tympanic)   Resp 14   Ht 1.626 m (5' 4\")   Wt 77.1 kg (170 lb)   LMP 12/26/2022   SpO2 100%   BMI 29.18 kg/m    Physical Exam  General appearance - alert, well appearing, and in no distress  Mental status - normal mood, behavior, speech, dress, motor activity, and thought processes  Eyes - pupils equal and reactive, extraocular eye movements intact, funduscopic exam normal, discs flat and sharp  Ears - bilateral TM's and external ear canals normal  Mouth - mucous membranes moist, pharynx normal without lesions  Neck - supple, no significant adenopathy, carotids upstroke normal bilaterally, no bruits, thyroid exam: thyroid is normal in size without nodules or tenderness  Chest - clear to auscultation, no wheezes, rales or rhonchi, symmetric air entry  Heart - normal rate, regular rhythm, normal S1, S2, no murmurs, rubs, clicks or gallops  Abdomen - soft, nontender, nondistended, no masses or organomegaly  Breasts - breasts appear normal, no suspicious masses, no skin or nipple changes or axillary nodes  Neurological - alert, oriented, normal speech, no focal findings or movement disorder noted, DTR's normal and symmetric  Extremities - peripheral " pulses normal, no pedal edema, no clubbing or cyanosis  Skin - no rashes or worrisome lesions

## 2023-02-02 PROBLEM — L40.50 PSORIATIC ARTHRITIS (H): Status: ACTIVE | Noted: 2023-02-02

## 2023-02-10 ENCOUNTER — VIRTUAL VISIT (OUTPATIENT)
Dept: NEUROLOGY | Facility: CLINIC | Age: 43
End: 2023-02-10
Payer: COMMERCIAL

## 2023-02-10 DIAGNOSIS — G43.009 MIGRAINE WITHOUT AURA AND WITHOUT STATUS MIGRAINOSUS, NOT INTRACTABLE: ICD-10-CM

## 2023-02-10 DIAGNOSIS — G43.709 CHRONIC MIGRAINE WITHOUT AURA WITHOUT STATUS MIGRAINOSUS, NOT INTRACTABLE: Primary | ICD-10-CM

## 2023-02-10 PROCEDURE — 99212 OFFICE O/P EST SF 10 MIN: CPT | Mod: VID | Performed by: NURSE PRACTITIONER

## 2023-02-10 RX ORDER — TOPIRAMATE 25 MG/1
100 TABLET, FILM COATED ORAL AT BEDTIME
Qty: 120 TABLET | Refills: 9 | Status: SHIPPED | OUTPATIENT
Start: 2023-02-10 | End: 2024-01-16

## 2023-02-10 RX ORDER — RIZATRIPTAN BENZOATE 5 MG/1
5-10 TABLET, ORALLY DISINTEGRATING ORAL
Qty: 18 TABLET | Refills: 6 | Status: SHIPPED | OUTPATIENT
Start: 2023-02-10

## 2023-02-10 ASSESSMENT — HEADACHE IMPACT TEST (HIT 6)
HOW OFTEN HAVE YOU FELT TOO TIRED TO WORK BECAUSE OF YOUR HEADACHES: RARELY
HOW OFTEN DID HEADACHS LIMIT CONCENTRATION ON WORK OR DAILY ACTIVITY: RARELY
WHEN YOU HAVE HEADACHES HOW OFTEN IS THE PAIN SEVERE: RARELY
HOW OFTEN DO HEADACHES LIMIT YOUR DAILY ACTIVITIES: RARELY
HOW OFTEN HAVE YOU FELT FED UP OR IRRITATED BECAUSE OF YOUR HEADACHES: RARELY
WHEN YOU HAVE A HEADACHE HOW OFTEN DO YOU WISH YOU COULD LIE DOWN: SOMETIMES
HIT6 TOTAL SCORE: 50

## 2023-02-10 ASSESSMENT — PATIENT HEALTH QUESTIONNAIRE - PHQ9
SUM OF ALL RESPONSES TO PHQ QUESTIONS 1-9: 11
10. IF YOU CHECKED OFF ANY PROBLEMS, HOW DIFFICULT HAVE THESE PROBLEMS MADE IT FOR YOU TO DO YOUR WORK, TAKE CARE OF THINGS AT HOME, OR GET ALONG WITH OTHER PEOPLE: SOMEWHAT DIFFICULT
SUM OF ALL RESPONSES TO PHQ QUESTIONS 1-9: 11

## 2023-02-10 ASSESSMENT — MIGRAINE DISABILITY ASSESSMENT (MIDAS)
HOW MANY DAYS IN THE PAST 3 MONTHS HAVE YOU HAD A HEADACHE: 12
HOW OFTEN WERE SOCIAL ACTIVITIES MISSED DUE TO HEADACHES: 0
ON A SCALE FROM 0-10 ON AVERAGE HOW PAINFUL WERE HEADACHES: 2
TOTAL SCORE: 3
HOW MANY DAYS WAS HOUSEWORK PRODUCTIVITY CUT IN HALF DUE TO HEADACHES: 1
HOW MANY DAYS DID YOU MISS WORK OR SCHOOL BECAUSE OF HEADACHES: 0
HOW MANY DAYS WAS YOUR PRODUCTIVITY CUT IN HALF BECAUSE OF HEADACHES: 1
HOW MANY DAYS DID YOU NOT DO HOUSEWORK BECAUSE OF HEADACHES: 1

## 2023-02-10 NOTE — LETTER
2/10/2023       RE: Leny Jeffrey  [address] [address]     Dear Colleague,    Thank you for referring your patient, Leny Jeffrey, to the [facility] [place] at [facility]. Please see a copy of my visit note below.      [facility] follow up visit note:  Last Headache Clinic visit 9/24/2021, see note for details  Does not have migraine often -may be one per month and may be once a week headaches. Uses rizatriptan and Excedrin. Feels that Excedrin works the best. Total headaches 4-6 /month.   Still on topiramate 100 mg at bedtime. No side effects but would like to go off because may be affecting mood-has been on 3 years but mood has not been good before starting topiramate.     Has been depressed for a few month and going off Wellbutrin and starting fluoxetine. 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 On humira for psoriatic arthritis-new for patient.     Plan:  Topiramate unchanged dose for now and may try gradual weaning off when ready by taking 1/2 tab less every week   Rescue -no changes-Excedrin and rizatriptan as needed  Follow up 6 months or sooner if needed     Patient appears alert and no in apparent acute distress,  mentation appears normal, judgement and insight intact, normal speech.    I discussed all my recommendations with Leny Jeffrey who verbalizes understanding and comfortable with the plan.  All of patient's questions were answered from the best of my knowledge.  Patient is in agreement with the plan.     12 minutes spent on the date of the encounter doing video access, chart  review,  meds review, treatment plan, documentation and further activities as noted above    SKY Espino, CNP [facility]  Headache certified  [facility]

## 2023-02-10 NOTE — PROGRESS NOTES
Video-Visit Details    Type of service:  Video Visit    Video Start Time (time video started): 7:10 AM      Video End Time (time video stopped): 7:22 AM      Originating Location (pt. Location): Home      Distant Location (provider location):  Off-site    Mode of Communication:  Video Conference via Novaliq      [facility] follow up visit note:  Last Headache Clinic visit 9/24/2021, see note for details  Does not have migraine often -may be one per month and may be once a week headaches. Uses rizatriptan and Excedrin. Feels that Excedrin works the best. Total headaches 4-6 /month.   Still on topiramate 100 mg at bedtime. No side effects but would like to go off because may be affecting mood-has been on 3 years but mood has not been good before starting topiramate.     Has been depressed for a few month and going off Wellbutrin and starting fluoxetine. 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 On humira for psoriatic arthritis-new for patient.     Plan:  Topiramate unchanged dose for now and may try gradual weaning off when ready by taking 1/2 tab less every week   Rescue -no changes-Excedrin and rizatriptan as needed  Follow up 6 months or sooner if needed     Patient appears alert and no in apparent acute distress,  mentation appears normal, judgement and insight intact, normal speech.    I discussed all my recommendations with Leny Jeffrey who verbalizes understanding and comfortable with the plan.  All of patient's questions were answered from the best of my knowledge.  Patient is in agreement with the plan.     12 minutes spent on the date of the encounter doing video access, chart  review,  meds review, treatment plan, documentation and further activities as noted above    SKY Espino, CNP [facility]  Headache certified  [facility]

## 2023-02-10 NOTE — NURSING NOTE
Is the patient currently in the state of [place]? YES    Visit mode:VIDEO    If the visit is dropped, the patient can be reconnected by: TELEPHONE VISIT: Phone number: [phone]    Will anyone else be joining the visit? NO      How would you like to obtain your AVS? MyChart    Are changes needed to the allergy or medication list? NO    Comments or concerns regarding today's visit:     Chief Complaint   Patient presents with     Video Visit     Follow-up

## 2023-02-13 ENCOUNTER — TELEPHONE (OUTPATIENT)
Dept: NEUROLOGY | Facility: CLINIC | Age: 43
End: 2023-02-13
Payer: COMMERCIAL

## 2023-02-13 NOTE — TELEPHONE ENCOUNTER
Called patient to sched 6 month follow up with Mary Lou Mcdaniels. CAILIN Martinez on 2/13/2023 at 11:47 AM

## 2023-02-24 ENCOUNTER — VIRTUAL VISIT (OUTPATIENT)
Dept: FAMILY MEDICINE | Facility: CLINIC | Age: 43
End: 2023-02-24
Payer: COMMERCIAL

## 2023-02-24 DIAGNOSIS — F33.41 RECURRENT MAJOR DEPRESSIVE DISORDER, IN PARTIAL REMISSION (H): ICD-10-CM

## 2023-02-24 PROCEDURE — 99213 OFFICE O/P EST LOW 20 MIN: CPT | Mod: VID | Performed by: FAMILY MEDICINE

## 2023-02-24 RX ORDER — UBIDECARENONE 75 MG
100 CAPSULE ORAL DAILY
COMMUNITY

## 2023-02-24 ASSESSMENT — PATIENT HEALTH QUESTIONNAIRE - PHQ9: SUM OF ALL RESPONSES TO PHQ QUESTIONS 1-9: 6

## 2023-02-24 NOTE — PROGRESS NOTES
"Leny is a 42 year old who is being evaluated via a billable video visit.      How would you like to obtain your AVS? MyChart  If the video visit is dropped, the invitation should be resent by: Send to e-mail at: [email or web address]  Will anyone else be joining your video visit? No    Assessment & Plan     Recurrent major depressive disorder, in partial remission (H)  Improved on  - FLUoxetine (PROZAC) 20 MG capsule  Dispense: 90 capsule; Refill: 1    Return in about 6 months (around 8/24/2023) for depression follow up.    Megan Liu MD  [facility]   Leny is a 42 year old accompanied by her self, presenting for the following health issues:  Recheck Medication (Follow up on fluoxetine. Topiramate 75mg )      HPI     Medication Followup of fluoxetine    Taking Medication as prescribed: yes    Side Effects:  Dry mouth    Medication Helping Symptoms:  Yes    PHQ 1/6/2023 2/10/2023 2/24/2023   PHQ-9 Total Score 17 11 6   Q9: Thoughts of better off dead/self-harm past 2 weeks Several days Not at all Not at all   F/U: Thoughts of suicide or self-harm No - -   F/U: Safety concerns No - -     Leny says, \"It's actually working.\"   -  A little dry mouth at first, getting better   - tired still but this precedes starting fluoxetine   - she has notices she wants to talk to people more   - she is getting more done at home and is more motivated   - she would like to  keep the dose where it is at for now, though can contact me if she would like to increase to 40 mg daily at some point    She stopped Wellbutrin 2 weeks ago    Arthralgias - diagnosed with possible psoriatic arthritis and recently started  Humira - has not seen an improvement yet      At annual exam with me on January 13, she got 3 vaccines: Tdap, Shingrix, and Pneumovax.  She survived this but was\"dead tired\" for several days    Traveling to [facility] in [place] [facility].  She will need " travel vaccines including typhoid and yellow fever.  We have typhoid at this clinic but not ill or fever.  I am not sure which clinics in our system would carry the yellow fever vaccine-she will look into this  However chart this is fine  Review of Systems         Objective           Vitals:  No vitals were obtained today due to virtual visit.    Physical Exam   GENERAL: Healthy, alert and no distress  EYES: Eyes grossly normal to inspection.  No discharge or erythema, or obvious scleral/conjunctival abnormalities.  RESP: No audible wheeze, cough, or visible cyanosis.  No visible retractions or increased work of breathing.    NEURO: Cranial nerves grossly intact.  Mentation and speech appropriate for age.  PSYCH: Mentation appears normal, affect normal/bright, judgement and insight intact, normal speech and appearance well-groomed.            Video-Visit Details    Type of service:  Video Visit     Originating Location (pt. Location): Home  Distant Location (provider location):  On-site  Platform used for Video Visit: Fiona     12:54 -1:12

## 2023-03-17 ENCOUNTER — MYC MEDICAL ADVICE (OUTPATIENT)
Dept: FAMILY MEDICINE | Facility: CLINIC | Age: 43
End: 2023-03-17
Payer: COMMERCIAL

## 2023-03-21 ENCOUNTER — E-VISIT (OUTPATIENT)
Dept: FAMILY MEDICINE | Facility: CLINIC | Age: 43
End: 2023-03-21
Payer: COMMERCIAL

## 2023-03-21 DIAGNOSIS — Z71.84 TRAVEL ADVICE ENCOUNTER: Primary | ICD-10-CM

## 2023-03-21 PROCEDURE — 99422 OL DIG E/M SVC 11-20 MIN: CPT | Performed by: FAMILY MEDICINE

## 2023-03-22 RX ORDER — ATOVAQUONE AND PROGUANIL HYDROCHLORIDE 250; 100 MG/1; MG/1
1 TABLET, FILM COATED ORAL DAILY
Qty: 20 TABLET | Refills: 0 | Status: SHIPPED | OUTPATIENT
Start: 2023-03-22 | End: 2023-04-11

## 2023-03-26 ENCOUNTER — MYC MEDICAL ADVICE (OUTPATIENT)
Dept: FAMILY MEDICINE | Facility: CLINIC | Age: 43
End: 2023-03-26
Payer: COMMERCIAL

## 2023-04-28 ENCOUNTER — ALLIED HEALTH/NURSE VISIT (OUTPATIENT)
Dept: FAMILY MEDICINE | Facility: CLINIC | Age: 43
End: 2023-04-28
Payer: COMMERCIAL

## 2023-04-28 DIAGNOSIS — Z23 NEED FOR VACCINATION: Primary | ICD-10-CM

## 2023-04-28 PROCEDURE — 90750 HZV VACC RECOMBINANT IM: CPT

## 2023-04-28 PROCEDURE — 90471 IMMUNIZATION ADMIN: CPT

## 2023-04-28 PROCEDURE — 99207 PR NO CHARGE NURSE ONLY: CPT

## 2023-04-28 NOTE — PROGRESS NOTES
Prior to immunization administration, verified patients identity using patient s name and date of birth. Please see Immunization Activity for additional information.     Screening Questionnaire for Adult Immunization    Are you sick today?   No   Do you have allergies to medications, food, a vaccine component or latex?   No   Have you ever had a serious reaction after receiving a vaccination?   No   Do you have a long-term health problem with heart, lung, kidney, or metabolic disease (e.g., diabetes), asthma, a blood disorder, no spleen, complement component deficiency, a cochlear implant, or a spinal fluid leak?  Are you on long-term aspirin therapy?   No   Do you have cancer, leukemia, HIV/AIDS, or any other immune system problem?   No   Do you have a parent, brother, or sister with an immune system problem?   No   In the past 3 months, have you taken medications that affect  your immune system, such as prednisone, other steroids, or anticancer drugs; drugs for the treatment of rheumatoid arthritis, Crohn s disease, or psoriasis; or have you had radiation treatments?   No   Have you had a seizure, or a brain or other nervous system problem?   No   During the past year, have you received a transfusion of blood or blood    products, or been given immune (gamma) globulin or antiviral drug?   No   For women: Are you pregnant or is there a chance you could become       pregnant during the next month?   No   Have you received any vaccinations in the past 4 weeks?   Yes     Immunization questionnaire was positive for at least one answer.  Notified PCP.    I have reviewed the following standing orders: Not Applicable; Order were already placed prior to ancillary visit    Injection of shingrix given by Arun Hartman RN. Patient instructed to remain in clinic for 15 minutes afterwards, and to report any adverse reactions.     Screening performed by Arun Hartman RN on 4/28/2023 at 1:20 PM.

## 2023-06-08 ENCOUNTER — HOSPITAL ENCOUNTER (OUTPATIENT)
Dept: MAMMOGRAPHY | Facility: CLINIC | Age: 43
Discharge: HOME | End: 2023-06-08
Attending: FAMILY MEDICINE | Admitting: FAMILY MEDICINE
Payer: COMMERCIAL

## 2023-06-08 DIAGNOSIS — Z12.31 VISIT FOR SCREENING MAMMOGRAM: ICD-10-CM

## 2023-06-08 PROCEDURE — 77067 SCR MAMMO BI INCL CAD: CPT

## 2023-08-29 ENCOUNTER — VIRTUAL VISIT (OUTPATIENT)
Dept: FAMILY MEDICINE | Facility: CLINIC | Age: 43
End: 2023-08-29
Payer: COMMERCIAL

## 2023-08-29 DIAGNOSIS — F33.41 RECURRENT MAJOR DEPRESSIVE DISORDER, IN PARTIAL REMISSION (H): ICD-10-CM

## 2023-08-29 PROCEDURE — 99213 OFFICE O/P EST LOW 20 MIN: CPT | Mod: VID | Performed by: FAMILY MEDICINE

## 2023-08-29 ASSESSMENT — PATIENT HEALTH QUESTIONNAIRE - PHQ9
10. IF YOU CHECKED OFF ANY PROBLEMS, HOW DIFFICULT HAVE THESE PROBLEMS MADE IT FOR YOU TO DO YOUR WORK, TAKE CARE OF THINGS AT HOME, OR GET ALONG WITH OTHER PEOPLE: SOMEWHAT DIFFICULT
SUM OF ALL RESPONSES TO PHQ QUESTIONS 1-9: 8
SUM OF ALL RESPONSES TO PHQ QUESTIONS 1-9: 8

## 2023-08-29 NOTE — PROGRESS NOTES
"Leny is a 42 year old who is being evaluated via a billable video visit.      How would you like to obtain your AVS? MyChart  If the video visit is dropped, the invitation should be resent by: Text to cell phone: [phone]  Will anyone else be joining your video visit? No  {If patient encounters technical issues they should call [phone] :863873}        {PROVIDER CHARTING PREFERENCE:937263}    Subjective   Leny is a 42 year old, presenting for the following health issues:  Follow Up (Pt is here for med check ) and Recheck Medication      8/29/2023     6:59 AM   Additional Questions   Roomed by yasmany         8/29/2023     6:59 AM   Patient Reported Additional Medications   Patient reports taking the following new medications quliptine 30 mg       History of Present Illness       Mental Health Follow-up:  Patient presents to follow-up on Depression.Patient's depression since last visit has been:  Better  The patient is having other symptoms associated with depression.      Any significant life events: No  Patient is not feeling anxious or having panic attacks.  Patient has no concerns about alcohol or drug use.She consumes 1 sweetened beverage(s) daily.She exercises with enough effort to increase her heart rate 20 to 29 minutes per day.  She exercises with enough effort to increase her heart rate 4 days per week.   She is taking medications regularly.       Pt is here for med check   {additonal problems for provider to add (Optional):701937}      Review of Systems   {ROS COMP (Optional):808063}      Objective    Vitals - Patient Reported  Pain Score: Severe Pain (6)  Pain Loc: Low Back        Physical Exam   {video visit exam brief selected:859447}    {Diagnostic Test Results (Optional):608491}    {AMBULATORY ATTESTATION (Optional):688036}        Video-Visit Details    Type of service:  Video Visit     Originating Location (pt. Location): {video visit patient location:561797::\"Home\"}  {PROVIDER LOCATION " "On-site should be selected for visits conducted from your clinic location or adjoining [facility] hospital, academic office, or other nearby [facility] building. Off-site should be selected for all other provider locations, including home:906042}  Distant Location (provider location):  {virtual location provider:178634}  Platform used for Video Visit: {Virtual Visit Platforms:329911::\"AmWell\"}      "

## 2023-08-29 NOTE — PROGRESS NOTES
"    Leny is a 42 year old who is being evaluated via a billable video visit.      How would you like to obtain your AVS? MyChart  If the video visit is dropped, the invitation should be resent by: Text to cell phone: [phone]  Will anyone else be joining your video visit? No          Assessment & Plan     Recurrent major depressive disorder, in partial remission (H)  Reasonably controlled on  - FLUoxetine (PROZAC) 20 MG capsule  Dispense: 90 capsule; Refill: 3    Return in about 5 months (around 1/29/2024) for Routine preventive.    Megan Liu MD  [facility]   Leny is a 42 year old, presenting for the following health issues:  Follow Up (Pt is here for med check ) and Recheck Medication      8/29/2023     6:59 AM   Additional Questions   Roomed by yasmany         8/29/2023     6:59 AM   Patient Reported Additional Medications   Patient reports taking the following new medications quliptine 30 mg       HPI     Depression Follow up   Last visit with me 2/24/23:    Leny says, \"It's actually working.\"   -  A little dry mouth at first, getting better   - tired still but this precedes starting fluoxetine   - she has notices she wants to talk to people more   - she is getting more done at home and is more motivated   - she would like to  keep the dose where it is at for now, though can contact me if she would like to increase to 40 mg daily at some point     She stopped Wellbutrin 2 weeks ago     Arthralgias - diagnosed with possible psoriatic arthritis and recently started  Humira - has not seen an improvement yet        At annual exam with me on January 13, she got 3 vaccines: Tdap, Shingrix, and Pneumovax.  She survived this but was\"dead tired\" for several days        TODAY      Regarding fluoxetine:   - No more side effects   - Always tired - that has not changed on fluoxetine   - Still on 20 mg - she appreciates that this gets her out of bed; she could go up but " "doesn't want to at present because of changes in other medications    Fatigue   - rheumatologist feels it may be due to psoriatic arthritis   - Sleep well 7-8 hours; though recently they got a new puppy which has kept Leny waking up every 2 hours   - on work nights she sleeps 6-8 hours, On weekend 14 hours    Chronic pain: migraines, back pain   - Leny started seeing a pain doctor for back - back and forth the between him and rheumatology   - Stopped Humira as she saw no difference in symptoms on this   - with her back doctor she is on the third round appeal to insurance for  coverage of \"intercept\"  procedure (sounds like lateral branch block)   - Tried low dose naltrexone for a couple of months - didn't work    - new medications Qulipta daily for migraines ; tried ubrelvy for rescue but that didn't work    Habits/ Social History:   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  Exercise - not as much as she wants - has been walking; got a pilates resistance set   Diet - no changes  Has gained weight again - but had stopped taking Topamax which helped -per neurologist (worried about brain issues)    ---    Answers submitted by the patient for this visit:  Patient Health Questionnaire (Submitted on 8/29/2023)  If you checked off any problems, how difficult have these problems made it for you to do your work, take care of things at home, or get along with other people?: Somewhat difficult  PHQ9 TOTAL SCORE: 8  Depression / Anxiety Questionnaire (Submitted on 8/29/2023)  Chief Complaint: Chronic problems general questions HPI Form  Depression/Anxiety: Depression  Depression only (Submitted on 8/29/2023)  Chief Complaint: Chronic problems general questions HPI Form  Status since last visit:: better  Other associated symptoms of depression:: Yes  Significant life event: : No  Anxious:: No  Current substance use:: No  General Questionnaire (Submitted on 8/29/2023)  Chief Complaint: Chronic problems general " questions HPI Form  On average, how many sweetened beverages do you drink each day (Examples: soda, juice, sweet tea, etc.  Do NOT count diet or artificially sweetened beverages)?: 1  How many minutes a day do you exercise enough to make your heart beat faster?: 20 to 29  How many days a week do you exercise enough to make your heart beat faster?: 4  How many days per week do you miss taking your medication?: 0                Objective           Vitals:  No vitals were obtained today due to virtual visit.    Physical Exam   GENERAL: Healthy, alert and no distress  RESP: No audible wheeze, cough, or visible cyanosis.  No visible retractions or increased work of breathing.    NEURO: Cranial nerves grossly intact.  Mentation and speech appropriate for age.  PSYCH: Mentation appears normal, affect normal/bright, judgement and insight intact, normal speech and appearance well-groomed.            Video-Visit Details    Type of service:  Video Visit   Start time: 7:01 AM  End time : 7:1 7 AM    Originating Location (pt. Location): Home    Distant Location (provider location):  On-site  Platform used for Video Visit: Fiona

## 2023-10-01 ENCOUNTER — MYC MEDICAL ADVICE (OUTPATIENT)
Dept: FAMILY MEDICINE | Facility: CLINIC | Age: 43
End: 2023-10-01
Payer: COMMERCIAL

## 2023-10-01 DIAGNOSIS — F33.41 RECURRENT MAJOR DEPRESSIVE DISORDER, IN PARTIAL REMISSION (H): Primary | ICD-10-CM

## 2023-10-02 RX ORDER — FLUOXETINE 40 MG/1
40 CAPSULE ORAL DAILY
Qty: 90 CAPSULE | Refills: 0 | Status: SHIPPED | OUTPATIENT
Start: 2023-10-02 | End: 2023-12-19

## 2023-12-19 ENCOUNTER — MYC REFILL (OUTPATIENT)
Dept: FAMILY MEDICINE | Facility: CLINIC | Age: 43
End: 2023-12-19
Payer: COMMERCIAL

## 2023-12-19 DIAGNOSIS — F33.41 RECURRENT MAJOR DEPRESSIVE DISORDER, IN PARTIAL REMISSION (H): ICD-10-CM

## 2023-12-20 RX ORDER — FLUOXETINE 40 MG/1
40 CAPSULE ORAL DAILY
Qty: 90 CAPSULE | Refills: 0 | Status: SHIPPED | OUTPATIENT
Start: 2023-12-20 | End: 2024-02-20

## 2024-01-01 NOTE — LETTER
Letter by Aida Tabares at      Author: Aida Tabares Service: -- Author Type: --    Filed:  Encounter Date: 8/27/2020 Status: (Other)         August 27, 2020       Leny Jeffrey  [address]  [facility] [address]    Dear Leny KASEY Jeffrey:    We are pleased to provide you with secure, online access to medical information for you and your family within [facility]. Per your request, we have expanded your account to allow access to the records of the following family members:              Zion SANDY Jeffrey (privilege ends on 10/3/2021.)            Steve TIDWELL Wojciech (privilege ends on 8/26/2021.)     How Do I Log In?  1. In your Internet browser, go to [email or web address]  2. Log into [employer] using your [employer] Username and Password.  3. Click Sign In.        How Do I Access a Family Member's Account?  4. Select the account you want to access by clicking the [place] with the appropriate patient's name at the top of your screen.   5. You will see a disclaimer page letting you know that you will be viewing a family member's record. Review the disclaimer and then click Accept Proxy Access Disclaimer to proceed.  6. Once you switch to viewing a family member's record, you can navigate to [employer] pages the same way you would for yourself. You can return to your own account by clicking the [place] at the top of the screen with your name on it.    7. To customize the colors and names of the linked accounts, you can select Personalize from the Profile dropdown menu at the top of the screen, then click the Edit button to make changes.     Additional Information  If you have questions, visit [email or web address], e-mail [email or web address] or call [phone] to talk to our [employer] staff. Remember, [employer] is NOT to be used for urgent needs. For medical emergencies, dial 911.             
0.05

## 2024-01-04 ENCOUNTER — MYC MEDICAL ADVICE (OUTPATIENT)
Dept: FAMILY MEDICINE | Facility: CLINIC | Age: 44
End: 2024-01-04
Payer: COMMERCIAL

## 2024-01-12 ASSESSMENT — ENCOUNTER SYMPTOMS
COUGH: 1
PARESTHESIAS: 0
NAUSEA: 0
JOINT SWELLING: 0
WEAKNESS: 0
HEMATOCHEZIA: 0
CHILLS: 0
MYALGIAS: 0
HEARTBURN: 0
FREQUENCY: 0
DIARRHEA: 0
BREAST MASS: 0
ABDOMINAL PAIN: 0
CONSTIPATION: 0
SORE THROAT: 0
DIZZINESS: 0
SHORTNESS OF BREATH: 0
DYSURIA: 0
HEMATURIA: 0
EYE PAIN: 0
FEVER: 0
NERVOUS/ANXIOUS: 0
PALPITATIONS: 0
HEADACHES: 0
ARTHRALGIAS: 1

## 2024-01-16 ENCOUNTER — OFFICE VISIT (OUTPATIENT)
Dept: FAMILY MEDICINE | Facility: CLINIC | Age: 44
End: 2024-01-16
Payer: COMMERCIAL

## 2024-01-16 VITALS
HEIGHT: 64 IN | DIASTOLIC BLOOD PRESSURE: 74 MMHG | RESPIRATION RATE: 19 BRPM | WEIGHT: 188.7 LBS | OXYGEN SATURATION: 99 % | TEMPERATURE: 97.9 F | HEART RATE: 82 BPM | BODY MASS INDEX: 32.21 KG/M2 | SYSTOLIC BLOOD PRESSURE: 108 MMHG

## 2024-01-16 DIAGNOSIS — G43.009 MIGRAINE WITHOUT AURA AND WITHOUT STATUS MIGRAINOSUS, NOT INTRACTABLE: ICD-10-CM

## 2024-01-16 DIAGNOSIS — L40.50 PSORIATIC ARTHRITIS (H): ICD-10-CM

## 2024-01-16 DIAGNOSIS — R05.1 ACUTE COUGH: ICD-10-CM

## 2024-01-16 DIAGNOSIS — E55.9 VITAMIN D DEFICIENCY: ICD-10-CM

## 2024-01-16 DIAGNOSIS — Z00.00 ROUTINE GENERAL MEDICAL EXAMINATION AT A HEALTH CARE FACILITY: Primary | ICD-10-CM

## 2024-01-16 DIAGNOSIS — F33.41 RECURRENT MAJOR DEPRESSIVE DISORDER, IN PARTIAL REMISSION (H): ICD-10-CM

## 2024-01-16 PROBLEM — A02.1: Status: ACTIVE | Noted: 2022-02-25

## 2024-01-16 PROCEDURE — 36415 COLL VENOUS BLD VENIPUNCTURE: CPT | Performed by: FAMILY MEDICINE

## 2024-01-16 PROCEDURE — 99396 PREV VISIT EST AGE 40-64: CPT | Performed by: FAMILY MEDICINE

## 2024-01-16 PROCEDURE — 99214 OFFICE O/P EST MOD 30 MIN: CPT | Mod: 25 | Performed by: FAMILY MEDICINE

## 2024-01-16 PROCEDURE — 96127 BRIEF EMOTIONAL/BEHAV ASSMT: CPT | Performed by: FAMILY MEDICINE

## 2024-01-16 PROCEDURE — 82306 VITAMIN D 25 HYDROXY: CPT | Performed by: FAMILY MEDICINE

## 2024-01-16 RX ORDER — BENZONATATE 100 MG/1
100 CAPSULE ORAL 3 TIMES DAILY PRN
Qty: 30 CAPSULE | Refills: 0 | Status: SHIPPED | OUTPATIENT
Start: 2024-01-16

## 2024-01-16 RX ORDER — ATOGEPANT 30 MG/1
30 TABLET ORAL AT BEDTIME
Qty: 90 TABLET | Refills: 3 | Status: SHIPPED | OUTPATIENT
Start: 2024-01-16

## 2024-01-16 RX ORDER — ATOGEPANT 30 MG/1
1 TABLET ORAL DAILY
COMMUNITY
Start: 2023-06-01

## 2024-01-16 RX ORDER — BENZONATATE 100 MG/1
100 CAPSULE ORAL 3 TIMES DAILY PRN
Qty: 30 CAPSULE | Refills: 0 | Status: SHIPPED | OUTPATIENT
Start: 2024-01-16 | End: 2024-01-16

## 2024-01-16 ASSESSMENT — ENCOUNTER SYMPTOMS
BREAST MASS: 0
CONSTIPATION: 0
DIZZINESS: 0
PARESTHESIAS: 0
PALPITATIONS: 0
HEARTBURN: 0
ARTHRALGIAS: 1
ABDOMINAL PAIN: 0
SORE THROAT: 0
DIARRHEA: 0
MYALGIAS: 0
CHILLS: 0
FREQUENCY: 0
NAUSEA: 0
SHORTNESS OF BREATH: 0
DYSURIA: 0
WEAKNESS: 0
HEMATOCHEZIA: 0
HEMATURIA: 0
EYE PAIN: 0
NERVOUS/ANXIOUS: 0
FEVER: 0
HEADACHES: 0
JOINT SWELLING: 0
COUGH: 1

## 2024-01-16 ASSESSMENT — PATIENT HEALTH QUESTIONNAIRE - PHQ9
SUM OF ALL RESPONSES TO PHQ QUESTIONS 1-9: 7
10. IF YOU CHECKED OFF ANY PROBLEMS, HOW DIFFICULT HAVE THESE PROBLEMS MADE IT FOR YOU TO DO YOUR WORK, TAKE CARE OF THINGS AT HOME, OR GET ALONG WITH OTHER PEOPLE: SOMEWHAT DIFFICULT
SUM OF ALL RESPONSES TO PHQ QUESTIONS 1-9: 7

## 2024-01-16 NOTE — PROGRESS NOTES
ASSESSMENT/PLAN:   Leny was seen today for physical.    Diagnoses and all orders for this visit:    Routine general medical examination at a health care facility    Acute cough -started with mild upper respiratory infection symptoms  -     benzonatate (TESSALON) 100 MG capsule; Take 1 capsule (100 mg) by mouth 3 times daily as needed for cough    Migraine without aura and without status migrainosus, not intractable  I am taking over the prescription of the medication below.  She gets yearly liver and kidney function test through her rheumatologist  -     Atogepant (QULIPTA) 30 MG TABS; Take 30 mg by mouth at bedtime    Psoriatic arthritis (H)  Followed by rheumatology and on Taltz injections    Recurrent major depressive disorder, in partial remission (H24)  She feels well-controlled on fluoxetine 40 mg daily-I can refill this when needed.  Some of her mildly elevated PHQ-9 score reflects her chronic back pain    Vitamin D deficiency  -     Vitamin D Deficiency    Other orders  -     REVIEW OF HEALTH MAINTENANCE PROTOCOL ORDERS  -     PRIMARY CARE FOLLOW-UP SCHEDULING; Future    Return in about 1 year (around 1/16/2025) for Routine preventive, or earlier as needed.      COUNSELING:  Reviewed preventive health counseling, as reflected in patient instructions        She reports that she has never smoked. She has never used smokeless tobacco.      Megan Liu MD  [facility]   SUBJECTIVE:   Leny is a 43 year old, presenting for the following:  Physical        1/16/2024     7:54 AM   Additional Questions   Roomed by Karen WHEATLEY CMA       Healthy Habits:     Getting at least 3 servings of Calcium per day:  Yes    Bi-annual eye exam:  Yes    Dental care twice a year:  Yes    Sleep apnea or symptoms of sleep apnea:  Daytime drowsiness    Diet:  Other    Frequency of exercise:  2-3 days/week    Duration of exercise:  15-30 minutes    Taking medications regularly:  Yes    Medication side  "effects:  None    Additional concerns today:  No    Exercise - has been walking and doing stairs at least 20 minutes daily  Diet regular - pescetarain    --   Chronic back pain - got a referral to pain management from her rheumatologist.  She is seeing Dr. Purcell for pain management.  They wrote a number of appeals letters to insurance to justify proposed Intracept procedure, but the appeals have been denied.       Psoriatic arthritis -Angela is also followed by rheumatologist Mini Saenz at [facility] who feels her pain is due to psoriatic arthritis.  Angela says she has tried \"Humira and some other things.\"  She is currently doing Taltz injection from rheumatology .  Angela notes that her pain is not improved but she feels less stiff.    Angela has  downloaded [facility] - cognitive behavioral therapy  approach for chronic pain. Just started a week ago - medication, writing and brain training exercises    Did try duloxetine at one point - didn't help mood or pain ,and cause  sexual dysfunction.      Depression : Fluoxetine 40 has been good.  She had stopped using bupropion when she started fluoxetine.        2/24/2023    12:58 PM 8/29/2023     6:43 AM 1/16/2024     7:38 AM   PHQ   PHQ-9 Total Score 6 8 7   Q9: Thoughts of better off dead/self-harm past 2 weeks Not at all Not at all Not at all        Migraines: - angela stopped seeing her neurologist for frequent migraines and headaches.  This provider had started Topamax but then Angela stopped it that she was not clear it was helping.  Her pain management. Dr. Purcell put her on Qulipta - headaches are better .s    The issue with migraines has always been the frequency rather than the intensity.  She is unaware of her triggers, it may be premenstrual but she can identify no others.  She has Maxalt for breakthrough migraines but she tries not to use it.  She has a sample of Ubrelvy to try for aborting migraines.      She had requested that I take over her " "prescription for Qulipta and I am happy to do that.  Review of monitoring parameters and up-to-date only lists \" Kidney and liver function (baseline and as clinically indicated).\"  Leny notes that she gets yearly comprehensive panels from         Today's PHQ-9 Score:       1/16/2024     7:38 AM   PHQ-9 SCORE   PHQ-9 Total Score MyChart 7 (Mild depression)   PHQ-9 Total Score 7     Preventive   - Lipids were normal last year    - Covid vaccine pfizer November 17 -I will add this to her immunization history   -She will check insurance covers HPV vaccine(Gardisil)    Social History: She is and RN, still working at  [employer]   - has a 21 year old daughter - went to [employer]   - sn age 15 in 9th grade          Social History     Tobacco Use    Smoking status: Never    Smokeless tobacco: Never   Substance Use Topics    Alcohol use: Yes     Comment: Rarely             1/12/2024     3:23 PM   Alcohol Use   Prescreen: >3 drinks/day or >7 drinks/week? No     Reviewed orders with patient.  Reviewed health maintenance and updated orders accordingly - Yes      Breast Cancer Screening:    FHS-7:       1/11/2022     7:02 AM 6/6/2022     7:15 AM 1/6/2023    11:11 AM 6/8/2023     7:15 AM 1/12/2024     3:25 PM   Breast CA Risk Assessment (FHS-7)   Did any of your first-degree relatives have breast or ovarian cancer? No No No No No   Did any of your relatives have bilateral breast cancer? No No No No Unknown   Did any man in your family have breast cancer? No No No No No   Did any woman in your family have breast and ovarian cancer? No No No No No   Did any woman in your family have breast cancer before age 50 y? No No No No No   Do you have 2 or more relatives with breast and/or ovarian cancer? No No No No No   Do you have 2 or more relatives with breast and/or bowel cancer? Yes Yes Yes Yes Yes     Maternal grandma and grandpa had colon cancer  Aunt on Dad's side  had colon cancer  Did genetic testing through [facility]      Pertinent mammograms are reviewed under the imaging tab.    History of abnormal Pap smear: YES - updated in Problem List and Health Maintenance accordingly: 2014[[place] and LEEP       Latest Ref Rng & Units 8/22/2019     4:32 PM 7/15/2016    11:35 AM 5/18/2015     2:25 PM   PAP / HPV   PAP Negative for squamous intraepithelial lesion or malignancy. Negative for squamous intraepithelial lesion or malignancy  Electronically signed by Patricia Benitez CT (ASCP) on 8/28/2019 at  1:59 PM    Negative for squamous intraepithelial lesion or malignancy  Electronically signed by Malena Londono CT (ASCP) on 7/26/2016 at 10:17 AM    Negative for squamous intraepithelial lesion or malignancy  Electronically signed by Malena Londono CT (ASCP) on 5/22/2015 at  1:28 PM      HPV 16 DNA NEG Negative      HPV 18 DNA NEG Negative      Other HR HPV NEG Negative        Reviewed and updated as needed this visit by clinical staff   Tobacco  Allergies  Meds              Reviewed and updated as needed this visit by Provider                      Review of Systems   Constitutional:  Negative for chills and fever.   HENT:  Negative for congestion, ear pain, hearing loss and sore throat.    Eyes:  Negative for pain and visual disturbance.   Respiratory:  Positive for cough. Negative for shortness of breath.    Cardiovascular:  Negative for chest pain, palpitations and peripheral edema.   Gastrointestinal:  Negative for abdominal pain, constipation, diarrhea, heartburn, hematochezia and nausea.   Breasts:  Negative for tenderness, breast mass and discharge.   Genitourinary:  Negative for dysuria, frequency, genital sores, hematuria, pelvic pain, urgency, vaginal bleeding and vaginal discharge.   Musculoskeletal:  Positive for arthralgias. Negative for joint swelling and myalgias.   Skin:  Negative for rash.   Neurological:  Negative for dizziness, weakness, headaches and paresthesias.   Psychiatric/Behavioral:   "Negative for mood changes. The patient is not nervous/anxious.      Dry cough getting better - a little more phlegmy - started last weekend       OBJECTIVE:   /74 (BP Location: Left arm, Patient Position: Sitting, Cuff Size: Adult Regular)   Pulse 82   Temp 97.9  F (36.6  C) (Tympanic)   Resp 19   Ht 1.626 m (5' 4\")   Wt 85.6 kg (188 lb 11.2 oz)   LMP 01/09/2024 (Exact Date)   SpO2 99%   Breastfeeding No   BMI 32.39 kg/m    Physical Exam  General appearance - alert, well appearing, and in no distress  Mental status - normal mood, behavior, speech, dress, motor activity, and thought processes  Eyes - pupils equal and reactive, extraocular eye movements intact, funduscopic exam normal, discs flat and sharp  Ears - bilateral TM's and external ear canals normal  Mouth - mucous membranes moist, pharynx normal without lesions  Neck - supple, no significant adenopathy, carotids upstroke normal bilaterally, no bruits, thyroid exam: thyroid is normal in size without nodules or tenderness  Chest - clear to auscultation, no wheezes, rales or rhonchi, symmetric air entry  Heart - normal rate, regular rhythm, normal S1, S2, no murmurs, rubs, clicks or gallops  Abdomen - soft, nontender, nondistended, no masses or organomegaly  Breasts - breasts appear normal, no suspicious masses, no skin or nipple changes or axillary nodes  Neurological - alert, oriented, normal speech, no focal findings or movement disorder noted, DTR's normal and symmetric  Extremities - peripheral pulses normal, no pedal edema, no clubbing or cyanosis  Skin - no rashes or worrisome lesions  "

## 2024-01-16 NOTE — PATIENT INSTRUCTIONS
Check insurance covers HPV vaccine(Gardisil)  Preventive Health Recommendations  Female Ages 40 to 49    Yearly exam:   See your health care provider every year in order to  Review health changes.   Discuss preventive care.    Review your medicines if your doctor prescribed any.    Get a Pap test every three years (unless you have an abnormal result and your provider advises testing more often).    If you get Pap tests with HPV test, you only need to test every 5 years, unless you have an abnormal result. You do not need a Pap test if your uterus was removed (hysterectomy) and you have not had cancer.    You should be tested each year for STDs (sexually transmitted diseases), if you're at risk.   Ask your doctor if you should have a mammogram.    Have a colonoscopy (test for colon cancer) beginning at age 45.  Ask your provider about other options like a yearly FIT test or Cologuard test every 3 years (stool tests)      Have a cholesterol test every 5 years.     Have a diabetes test (fasting glucose) after age 45. If you are at risk for diabetes, you should have this test every 3 years.    Shots: Get a flu shot each year. Get a tetanus shot every 10 years.     Nutrition:   Eat at least 5 servings of fruits and vegetables each day.  Eat whole-grain bread, whole-wheat pasta and brown rice instead of white grains and rice.  Get adequate Calcium and Vitamin D.      Lifestyle  Exercise at least 150 minutes a week (an average of 30 minutes a day, 5 days a week). This will help you control your weight and prevent disease.  Limit alcohol to one drink per day.  No smoking.   Wear sunscreen to prevent skin cancer.  See your dentist every six months for an exam and cleaning.

## 2024-01-17 LAB — VIT D+METAB SERPL-MCNC: 32 NG/ML (ref 20–50)

## 2024-01-23 ENCOUNTER — TRANSFERRED RECORDS (OUTPATIENT)
Dept: HEALTH INFORMATION MANAGEMENT | Facility: CLINIC | Age: 44
End: 2024-01-23
Payer: COMMERCIAL

## 2024-01-24 ENCOUNTER — TELEPHONE (OUTPATIENT)
Dept: FAMILY MEDICINE | Facility: CLINIC | Age: 44
End: 2024-01-24
Payer: COMMERCIAL

## 2024-01-24 NOTE — TELEPHONE ENCOUNTER
January 24, 2024    Outside records received from [facility] office note of 01/23/2024.  Records were placed in the inbox for Dr. Liu to review.  A copy was sent to HIM to be scanned into the patient's chart.    Sharlene Paulion

## 2024-02-20 DIAGNOSIS — F33.41 RECURRENT MAJOR DEPRESSIVE DISORDER, IN PARTIAL REMISSION (H): ICD-10-CM

## 2024-02-21 RX ORDER — FLUOXETINE 40 MG/1
40 CAPSULE ORAL DAILY
Qty: 90 CAPSULE | Refills: 3 | Status: SHIPPED | OUTPATIENT
Start: 2024-02-21

## 2024-02-22 NOTE — TELEPHONE ENCOUNTER
Visit with me 1/16/24:    Recurrent major depressive disorder, in partial remission (H24)  She feels well-controlled on fluoxetine 40 mg daily-I can refill this when needed.  Some of her mildly elevated PHQ-9 score reflects her chronic back pain

## 2024-06-03 ENCOUNTER — MYC MEDICAL ADVICE (OUTPATIENT)
Dept: FAMILY MEDICINE | Facility: CLINIC | Age: 44
End: 2024-06-03
Payer: COMMERCIAL

## 2024-06-24 ENCOUNTER — TRANSFERRED RECORDS (OUTPATIENT)
Dept: HEALTH INFORMATION MANAGEMENT | Facility: CLINIC | Age: 44
End: 2024-06-24
Payer: COMMERCIAL

## 2025-02-02 SDOH — HEALTH STABILITY: PHYSICAL HEALTH: ON AVERAGE, HOW MANY MINUTES DO YOU ENGAGE IN EXERCISE AT THIS LEVEL?: 30 MIN

## 2025-02-02 SDOH — HEALTH STABILITY: PHYSICAL HEALTH: ON AVERAGE, HOW MANY DAYS PER WEEK DO YOU ENGAGE IN MODERATE TO STRENUOUS EXERCISE (LIKE A BRISK WALK)?: 3 DAYS

## 2025-02-02 ASSESSMENT — SOCIAL DETERMINANTS OF HEALTH (SDOH): HOW OFTEN DO YOU GET TOGETHER WITH FRIENDS OR RELATIVES?: ONCE A WEEK

## 2025-02-06 ASSESSMENT — PATIENT HEALTH QUESTIONNAIRE - PHQ9
SUM OF ALL RESPONSES TO PHQ QUESTIONS 1-9: 4
SUM OF ALL RESPONSES TO PHQ QUESTIONS 1-9: 4
10. IF YOU CHECKED OFF ANY PROBLEMS, HOW DIFFICULT HAVE THESE PROBLEMS MADE IT FOR YOU TO DO YOUR WORK, TAKE CARE OF THINGS AT HOME, OR GET ALONG WITH OTHER PEOPLE: NOT DIFFICULT AT ALL

## 2025-02-07 ENCOUNTER — OFFICE VISIT (OUTPATIENT)
Dept: FAMILY MEDICINE | Facility: CLINIC | Age: 45
End: 2025-02-07
Attending: FAMILY MEDICINE
Payer: COMMERCIAL

## 2025-02-07 VITALS
BODY MASS INDEX: 26.63 KG/M2 | TEMPERATURE: 97.3 F | HEART RATE: 77 BPM | DIASTOLIC BLOOD PRESSURE: 73 MMHG | OXYGEN SATURATION: 100 % | WEIGHT: 156 LBS | RESPIRATION RATE: 16 BRPM | SYSTOLIC BLOOD PRESSURE: 105 MMHG | HEIGHT: 64 IN

## 2025-02-07 DIAGNOSIS — Z51.81 ENCOUNTER FOR THERAPEUTIC DRUG MONITORING: ICD-10-CM

## 2025-02-07 DIAGNOSIS — F33.41 RECURRENT MAJOR DEPRESSIVE DISORDER, IN PARTIAL REMISSION: ICD-10-CM

## 2025-02-07 DIAGNOSIS — Z00.00 ROUTINE GENERAL MEDICAL EXAMINATION AT A HEALTH CARE FACILITY: Primary | ICD-10-CM

## 2025-02-07 DIAGNOSIS — Z12.4 CERVICAL CANCER SCREENING: ICD-10-CM

## 2025-02-07 DIAGNOSIS — G43.009 MIGRAINE WITHOUT AURA AND WITHOUT STATUS MIGRAINOSUS, NOT INTRACTABLE: ICD-10-CM

## 2025-02-07 DIAGNOSIS — N81.6 RECTOCELE: ICD-10-CM

## 2025-02-07 LAB
ALBUMIN SERPL BCG-MCNC: 4.2 G/DL (ref 3.5–5.2)
ALP SERPL-CCNC: 65 U/L (ref 40–150)
ALT SERPL W P-5'-P-CCNC: 21 U/L (ref 0–50)
ANION GAP SERPL CALCULATED.3IONS-SCNC: 9 MMOL/L (ref 7–15)
AST SERPL W P-5'-P-CCNC: 20 U/L (ref 0–45)
BILIRUB SERPL-MCNC: 0.3 MG/DL
BUN SERPL-MCNC: 16.1 MG/DL (ref 6–20)
CALCIUM SERPL-MCNC: 9.4 MG/DL (ref 8.8–10.4)
CHLORIDE SERPL-SCNC: 104 MMOL/L (ref 98–107)
CREAT SERPL-MCNC: 0.89 MG/DL (ref 0.51–0.95)
EGFRCR SERPLBLD CKD-EPI 2021: 82 ML/MIN/1.73M2
GLUCOSE SERPL-MCNC: 85 MG/DL (ref 70–99)
HCO3 SERPL-SCNC: 25 MMOL/L (ref 22–29)
POTASSIUM SERPL-SCNC: 4 MMOL/L (ref 3.4–5.3)
PROT SERPL-MCNC: 7 G/DL (ref 6.4–8.3)
SODIUM SERPL-SCNC: 138 MMOL/L (ref 135–145)

## 2025-02-07 PROCEDURE — 96127 BRIEF EMOTIONAL/BEHAV ASSMT: CPT | Performed by: FAMILY MEDICINE

## 2025-02-07 PROCEDURE — 90656 IIV3 VACC NO PRSV 0.5 ML IM: CPT | Performed by: FAMILY MEDICINE

## 2025-02-07 PROCEDURE — G0145 SCR C/V CYTO,THINLAYER,RESCR: HCPCS | Performed by: FAMILY MEDICINE

## 2025-02-07 PROCEDURE — 99214 OFFICE O/P EST MOD 30 MIN: CPT | Mod: 25 | Performed by: FAMILY MEDICINE

## 2025-02-07 PROCEDURE — 36415 COLL VENOUS BLD VENIPUNCTURE: CPT | Performed by: FAMILY MEDICINE

## 2025-02-07 PROCEDURE — 90471 IMMUNIZATION ADMIN: CPT | Performed by: FAMILY MEDICINE

## 2025-02-07 PROCEDURE — 87624 HPV HI-RISK TYP POOLED RSLT: CPT | Performed by: FAMILY MEDICINE

## 2025-02-07 PROCEDURE — 99396 PREV VISIT EST AGE 40-64: CPT | Mod: 25 | Performed by: FAMILY MEDICINE

## 2025-02-07 PROCEDURE — 80053 COMPREHEN METABOLIC PANEL: CPT | Performed by: FAMILY MEDICINE

## 2025-02-07 RX ORDER — MAGNESIUM GLYCINATE 100 MG
400 CAPSULE ORAL AT BEDTIME
COMMUNITY
Start: 2025-02-07

## 2025-02-07 RX ORDER — FLUOXETINE HYDROCHLORIDE 40 MG/1
40 CAPSULE ORAL DAILY
Qty: 90 CAPSULE | Refills: 3 | Status: SHIPPED | OUTPATIENT
Start: 2025-02-07

## 2025-02-07 RX ORDER — ATOGEPANT 30 MG/1
30 TABLET ORAL AT BEDTIME
Qty: 90 TABLET | Refills: 3 | Status: SHIPPED | OUTPATIENT
Start: 2025-02-07

## 2025-02-07 ASSESSMENT — PAIN SCALES - GENERAL: PAINLEVEL_OUTOF10: MODERATE PAIN (4)

## 2025-02-07 NOTE — PATIENT INSTRUCTIONS
Patient Education   Preventive Care Advice   This is general advice given by our system to help you stay healthy. However, your care team may have specific advice just for you. Please talk to your care team about your preventive care needs.  Nutrition  Eat 5 or more servings of fruits and vegetables each day.  Try wheat bread, brown rice and whole grain pasta (instead of white bread, rice, and pasta).  Get enough calcium and vitamin D. Check the label on foods and aim for 100% of the RDA (recommended daily allowance).  Lifestyle  Exercise at least 150 minutes each week  (30 minutes a day, 5 days a week).  Do muscle strengthening activities 2 days a week. These help control your weight and prevent disease.  No smoking.  Wear sunscreen to prevent skin cancer.  Have a dental exam and cleaning every 6 months.  Yearly exams  See your health care team every year to talk about:  Any changes in your health.  Any medicines your care team has prescribed.  Preventive care, family planning, and ways to prevent chronic diseases.  Shots (vaccines)   HPV shots (up to age 26), if you've never had them before.  Hepatitis B shots (up to age 59), if you've never had them before.  COVID-19 shot: Get this shot when it's due.  Flu shot: Get a flu shot every year.  Tetanus shot: Get a tetanus shot every 10 years.  Pneumococcal, hepatitis A, and RSV shots: Ask your care team if you need these based on your risk.  Shingles shot (for age 50 and up)  General health tests  Diabetes screening:  Starting at age 35, Get screened for diabetes at least every 3 years.  If you are younger than age 35, ask your care team if you should be screened for diabetes.  Cholesterol test: At age 39, start having a cholesterol test every 5 years, or more often if advised.  Bone density scan (DEXA): At age 50, ask your care team if you should have this scan for osteoporosis (brittle bones).  Hepatitis C: Get tested at least once in your life.  STIs (sexually  transmitted infections)  Before age 24: Ask your care team if you should be screened for STIs.  After age 24: Get screened for STIs if you're at risk. You are at risk for STIs (including HIV) if:  You are sexually active with more than one person.  You don't use condoms every time.  You or a partner was diagnosed with a sexually transmitted infection.  If you are at risk for HIV, ask about PrEP medicine to prevent HIV.  Get tested for HIV at least once in your life, whether you are at risk for HIV or not.  Cancer screening tests  Cervical cancer screening: If you have a cervix, begin getting regular cervical cancer screening tests starting at age 21.  Breast cancer scan (mammogram): If you've ever had breasts, begin having regular mammograms starting at age 40. This is a scan to check for breast cancer.  Colon cancer screening: It is important to start screening for colon cancer at age 45.  Have a colonoscopy test every 10 years (or more often if you're at risk) Or, ask your provider about stool tests like a FIT test every year or Cologuard test every 3 years.  To learn more about your testing options, visit:   .  For help making a decision, visit:   https://bit.ly/ft93361.  Prostate cancer screening test: If you have a prostate, ask your care team if a prostate cancer screening test (PSA) at age 55 is right for you.  Lung cancer screening: If you are a current or former smoker ages 50 to 80, ask your care team if ongoing lung cancer screenings are right for you.  For informational purposes only. Not to replace the advice of your health care provider. Copyright   2023 [employer]. All rights reserved. Clinically reviewed by the [facility] Transitions Program. [employer] [id number] - REV 01/24.

## 2025-02-07 NOTE — PROGRESS NOTES
"Preventive Care Visit  [facility]  Megan Liu MD, Family Medicine  Feb 7, 2025      Assessment & Plan     Routine general medical examination at a health care facility    Migraine without aura and without status migrainosus, not intractable  Well controlled on:   - Atogepant (QULIPTA) 30 MG TABS  Dispense: 90 tablet; Refill: 3    Recurrent major depressive disorder, in partial remission  Well controlled on:   - FLUoxetine (PROZAC) 40 MG capsule  Dispense: 90 capsule; Refill: 3    Encounter for therapeutic drug monitoring  - Comprehensive metabolic panel (BMP + Alb, Alk Phos, ALT, AST, Total. Bili, TP)    Rectocele  This is Leny's concern based on feeling on a bulge in her posterior vagina and sometimes having to push on this area to facilitate evacuation..  I cannot feel this on exam when she is either supine for the pelvic exam, nor standing up.  It could also be that she is having uncoordinated pelvic floor movements.  - Adult Colorectal Surgery  Referral - [facility] ([facility]    Cervical cancer screening  - HPV and Gynecologic Cytology Panel - Recommended Age 30 - 65 Years    Return in about 1 year (around 2/7/2026) for Routine preventive, or earlier as needed.      BMI  Estimated body mass index is 26.78 kg/m  as calculated from the following:    Height as of this encounter: 1.626 m (5' 4\").    Weight as of this encounter: 70.8 kg (156 lb).   Weight management plan: She has lost weight with semaglutide    Counseling  Appropriate preventive services were addressed with this patient via screening, questionnaire, or discussion as appropriate for fall prevention, nutrition, physical activity, Tobacco-use cessation, social engagement, weight loss and cognition.  Checklist reviewing preventive services available has been given to the patient.  Reviewed patient's diet, addressing concerns and/or questions.   She is at risk for lack of exercise and has been " "provided with information to increase physical activity for the benefit of her well-being.       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  She said [facility] let her go.  She now works at [employer] in [place], a job she likes.  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   - Her daughter is 22 studying engineering at  [employer] and traveling the world .  She will be going to [employer] and [employer] in the future.    - Her son is 16-year-old and a sophomore   - They also have 2 cats and a dog at home    One visit with a [facility] MD  ---    Migraine without aura and without status migrainosus, not intractable  I  have taken over prescription for Qulipta from her neurologist .    Psoriatic arthritis (H) -Leny has seen different rheumatologists and tried different medications, none of which seemingly worked.  She had seen Dr. Trevino and tried a number of different medications, and also saw the spine clinic in our system.  More recently she has seen Caleb Dotson MD who tried her on Talz injections.  These did not work either  Followed by rheumatology and on Taltz injections    Leny has concluded: \"I don't think my back will be addressed by anything.\"  So she stopped all medications and she still has pain, but she is working on mindfulness based stress reduction.  She feels better able to manage the pain she has    She also Lost 30 pounds: She got a prescription for semaglutide via an online service.  She stopped taking semaglutide glue tied 2 weeks ago.  She feels the weight loss is also helped her back    She wonders if she has a rectocele?  She can feel bulge of stool from under vagina.  She is not straining but feels she has to push on the posterior of her vagina sometimes to facilitate evacuation.  She says her stools " have been hard and clumpy at times but now she is taking Metamucil       Recurrent major depressive disorder, in partial remission (H24)  She feels well-controlled on fluoxetine 40 mg daily.  Some of her mildly elevated PHQ-9 score reflects her chronic back pain.        8/29/2023     6:43 AM 1/16/2024     7:38 AM 2/6/2025     9:40 AM   PHQ   PHQ-9 Total Score 8 7 4    Q9: Thoughts of better off dead/self-harm past 2 weeks Not at all Not at all Not at all       Patient-reported        Notable family history: Her sister had a hemorrhagic stroke this summer.  She had been taking atomoxetine and Viibryd and there was a warning about reversible vasoconstriction.  She also notes her sister tends to be angry and stressed and wonders how much this contributed.  At first her sister could not move at all, now her left arm is coming back    I asked about they are going up history.  Their father had alcoholism and mental toll illness and left the home when Leny was 3 but her sister was 5.  Potentially this might have been a more sensitive age for her sister to lose their father             Reproductive Health  HEAVY, WORSE , BACK ACHES   ABSTINENCE, TUBAL REMOVAL         Advance care planning document is on file and is current.            2/2/2025   General Health   How would you rate your overall physical health? Good   Feel stress (tense, anxious, or unable to sleep) Only a little   (!) STRESS CONCERN      2/2/2025   Nutrition   Three or more servings of calcium each day? Yes   Diet: Other   If other, please elaborate: Pescatarian   How many servings of fruit and vegetables per day? (!) 2-3   How many sweetened beverages each day? 0-1         2/2/2025   Exercise   Days per week of moderate/strenuous exercise 3 days   Average minutes spent exercising at this level 30 min   Walking, will start lesa lifting again        2/2/2025   Social Factors   Frequency of gathering with friends or relatives Once a week   Worry food won't  last until get money to buy more No   Food not last or not have enough money for food? No   Do you have housing? (Housing is defined as stable permanent housing and does not include staying outside in a car, in a tent, in an abandoned building, in an overnight shelter, or couch-surfing.) Yes   Are you worried about losing your housing? No   Lack of transportation? No   Unable to get utilities (heat,electricity)? No         2/2/2025   Dental   Dentist two times every year? Yes          Today's PHQ-9 Score:       2/6/2025     9:40 AM   PHQ-9 SCORE   PHQ-9 Total Score MyChart 4 (Minimal depression)   PHQ-9 Total Score 4        Patient-reported         2/2/2025   Substance Use   Alcohol more than 3/day or more than 7/wk No   Do you use any other substances recreationally? No     Social History     Tobacco Use    Smoking status: Never     Passive exposure: Never    Smokeless tobacco: Never   Vaping Use    Vaping status: Never Used   Substance Use Topics    Alcohol use: Yes     Comment: Rarely    Drug use: Never           1/12/2024   LAST FHS-7 RESULTS   1st degree relative breast or ovarian cancer No   Any relative bilateral breast cancer Unknown   Any male have breast cancer No   Any ONE woman have BOTH breast AND ovarian cancer No   Any woman with breast cancer before 50yrs No   2 or more relatives with breast AND/OR ovarian cancer No   2 or more relatives with breast AND/OR bowel cancer Yes     MGM - colon cancer  Paternal aunt had colon cancer    [facility] through [facility] and had genetic testing            2/2/2025   STI Screening   New sexual partner(s) since last STI/HIV test? No     History of abnormal Pap smear: YES - reflected in Problem List and Health Maintenance accordingly    [facility] 2014 5/2015: NIL and HPV neg  7/15/2016: NIL and neg HPV  8/22/2019: NILM and neg HPV, next due 8/2024        Latest Ref Rng & Units 8/22/2019     4:32 PM 7/15/2016    11:35 AM 5/18/2015     2:25 PM   PAP / HPV   PAP Negative  "for squamous intraepithelial lesion or malignancy. Negative for squamous intraepithelial lesion or malignancy  Electronically signed by Patricia Benitez CT (ASCP) on 8/28/2019 at  1:59 PM    Negative for squamous intraepithelial lesion or malignancy  Electronically signed by Malena Londono CT (ASCP) on 7/26/2016 at 10:17 AM    Negative for squamous intraepithelial lesion or malignancy  Electronically signed by Malena Londono CT (ASCP) on 5/22/2015 at  1:28 PM      HPV 16 DNA NEG Negative      HPV 18 DNA NEG Negative      Other HR HPV NEG Negative          ASCVD Risk   The 10-year ASCVD risk score (Millie HEREDIA, et al., 2019) is: 0.4%    Values used to calculate the score:      Age: 44 years      Sex: Female      Is Non- : No      Diabetic: No      Tobacco smoker: No      Systolic Blood Pressure: 105 mmHg      Is BP treated: No      HDL Cholesterol: 50 mg/dL      Total Cholesterol: 160 mg/dL        2/2/2025   Contraception/Family Planning   Questions about contraception or family planning No -         Reviewed and updated as needed this visit by Provider                       Objective    Exam  /73 (BP Location: Left arm, Patient Position: Sitting, Cuff Size: Adult Regular)   Pulse 77   Temp 97.3  F (36.3  C)   Resp 16   Ht 1.626 m (5' 4\")   Wt 70.8 kg (156 lb)   LMP 01/20/2025 (Approximate)   SpO2 100%   BMI 26.78 kg/m     Estimated body mass index is 26.78 kg/m  as calculated from the following:    Height as of this encounter: 1.626 m (5' 4\").    Weight as of this encounter: 70.8 kg (156 lb).    Physical Exam  General appearance - alert, well appearing, and in no distress  Mental status - normal mood, behavior, speech, dress, motor activity, and thought processes  Eyes - pupils equal and reactive, extraocular eye movements intact, funduscopic exam normal, discs flat and sharp  Ears - bilateral TM's and external ear canals normal  Mouth - mucous membranes " moist, pharynx normal without lesions  Neck - supple, no significant adenopathy, carotids upstroke normal bilaterally, no bruits, thyroid exam: thyroid is normal in size without nodules or tenderness  Chest - clear to auscultation, no wheezes, rales or rhonchi, symmetric air entry  Heart - normal rate, regular rhythm, normal S1, S2, no murmurs, rubs, clicks or gallops  Abdomen - soft, nontender, nondistended, no masses or organomegaly  Breasts - breasts appear normal, no suspicious masses, no skin or nipple changes or axillary nodes  Pelvic exam: normal vagina and vulva, normal cervix without lesions or tenderness, pap smear done.  I cannot palpate a bulge in the posterior vagina either when she is supine for the pelvic exam or standing.  Neurological - alert, oriented, normal speech, no focal findings or movement disorder noted, DTR's normal and symmetric  Extremities - peripheral pulses normal, no pedal edema, no clubbing or cyanosis  Skin - no rashes or worrisome lesions          Signed Electronically by: Megan Liu MD

## 2025-02-10 LAB
HPV HR 12 DNA CVX QL NAA+PROBE: NEGATIVE
HPV16 DNA CVX QL NAA+PROBE: NEGATIVE
HPV18 DNA CVX QL NAA+PROBE: NEGATIVE
HUMAN PAPILLOMA VIRUS FINAL DIAGNOSIS: NORMAL

## 2025-02-12 LAB
BKR AP ASSOCIATED HPV REPORT: NORMAL
BKR LAB AP GYN ADEQUACY: NORMAL
BKR LAB AP GYN INTERPRETATION: NORMAL
BKR LAB AP LMP: NORMAL
BKR LAB AP PREVIOUS ABNORMAL: NORMAL
PATH REPORT.COMMENTS IMP SPEC: NORMAL
PATH REPORT.COMMENTS IMP SPEC: NORMAL
PATH REPORT.RELEVANT HX SPEC: NORMAL

## 2025-08-09 ENCOUNTER — ANCILLARY PROCEDURE (OUTPATIENT)
Dept: MAMMOGRAPHY | Facility: CLINIC | Age: 45
End: 2025-08-09
Attending: FAMILY MEDICINE
Payer: COMMERCIAL

## 2025-08-09 DIAGNOSIS — Z12.31 VISIT FOR SCREENING MAMMOGRAM: ICD-10-CM

## 2025-08-09 PROCEDURE — 77067 SCR MAMMO BI INCL CAD: CPT
